# Patient Record
Sex: FEMALE | Race: WHITE | NOT HISPANIC OR LATINO | Employment: OTHER | URBAN - NONMETROPOLITAN AREA
[De-identification: names, ages, dates, MRNs, and addresses within clinical notes are randomized per-mention and may not be internally consistent; named-entity substitution may affect disease eponyms.]

---

## 2017-01-01 ENCOUNTER — CLINICAL SUPPORT (OUTPATIENT)
Dept: AUDIOLOGY | Facility: CLINIC | Age: 82
End: 2017-01-01

## 2017-01-01 DIAGNOSIS — Z46.1 ENCOUNTER FOR FITTING OR ADJUSTMENT OF HEARING AID: Primary | ICD-10-CM

## 2017-01-01 PROCEDURE — HEARINGNOCHG: Performed by: AUDIOLOGIST

## 2017-01-11 ENCOUNTER — CLINICAL SUPPORT (OUTPATIENT)
Dept: AUDIOLOGY | Facility: CLINIC | Age: 82
End: 2017-01-11

## 2017-01-11 DIAGNOSIS — Z46.1 ENCOUNTER FOR FITTING OR ADJUSTMENT OF HEARING AID: Primary | ICD-10-CM

## 2017-01-11 PROCEDURE — HEARINGNOCHG: Performed by: AUDIOLOGIST

## 2017-01-11 NOTE — PROGRESS NOTES
HEARING AID CHECK    Name:  Tiffanie Arroyo  :  9/10/1926  Age:  89 y.o.  Date of Evaluation:  2017      HISTORY    Reason for visit:  Tiffanie Arroyo is seen today for a hearing aid check.  Patient reports she just needs her hearing aids cleaned and checked.    Hearing aid history:  Patient is currently wearing a In the Ear (ITE) hearing aid in both ear(s).      OFFICE VISIT    During today's visit hearing aids were cleaned and checked and were found to be in good working condition.  Ms. Arroyo wishes to return for another cleaning in 1 year.    It was a pleasure seeing Tiffanie Arroyo in Audiology today.  It is a pleasure helping Ms. Arroyo with her amplification needs.             This document has been electronically signed by Yenifer Judge MS CCC-KENNETH on 2017 10:24 AM       Yenifer Judge MS CCC-KENNETH  Licensed Audiologist    For Billing and Codin  Hearing Aid Check, binaural - no charge

## 2017-01-16 ENCOUNTER — OFFICE VISIT (OUTPATIENT)
Dept: FAMILY MEDICINE CLINIC | Facility: CLINIC | Age: 82
End: 2017-01-16

## 2017-01-16 ENCOUNTER — HOSPITAL ENCOUNTER (OUTPATIENT)
Dept: OTHER | Facility: HOSPITAL | Age: 82
Discharge: HOME | End: 2017-01-16
Attending: FAMILY MEDICINE | Admitting: FAMILY MEDICINE

## 2017-01-16 VITALS
WEIGHT: 114 LBS | OXYGEN SATURATION: 96 % | DIASTOLIC BLOOD PRESSURE: 60 MMHG | BODY MASS INDEX: 18.97 KG/M2 | SYSTOLIC BLOOD PRESSURE: 90 MMHG | HEART RATE: 62 BPM

## 2017-01-16 DIAGNOSIS — G89.29 CHRONIC MIDLINE LOW BACK PAIN WITHOUT SCIATICA: ICD-10-CM

## 2017-01-16 DIAGNOSIS — M54.50 CHRONIC MIDLINE LOW BACK PAIN WITHOUT SCIATICA: ICD-10-CM

## 2017-01-16 DIAGNOSIS — M25.562 ACUTE PAIN OF LEFT KNEE: Primary | ICD-10-CM

## 2017-01-16 PROCEDURE — 99214 OFFICE O/P EST MOD 30 MIN: CPT | Performed by: FAMILY MEDICINE

## 2017-01-16 RX ORDER — CARVEDILOL 12.5 MG/1
12.5 TABLET ORAL 2 TIMES DAILY WITH MEALS
Qty: 60 TABLET | Refills: 11 | Status: SHIPPED | OUTPATIENT
Start: 2017-01-16 | End: 2017-02-20

## 2017-01-16 RX ORDER — FUROSEMIDE 20 MG/1
40 TABLET ORAL DAILY
Qty: 60 TABLET | Refills: 11 | Status: SHIPPED | OUTPATIENT
Start: 2017-01-16 | End: 2017-01-16 | Stop reason: SDUPTHER

## 2017-01-16 RX ORDER — HYDROCODONE BITARTRATE AND ACETAMINOPHEN 5; 325 MG/1; MG/1
1 TABLET ORAL EVERY 8 HOURS PRN
Qty: 90 TABLET | Refills: 0 | Status: SHIPPED | OUTPATIENT
Start: 2017-01-16 | End: 2017-02-20 | Stop reason: SDUPTHER

## 2017-01-16 RX ORDER — FUROSEMIDE 20 MG/1
TABLET ORAL
Qty: 60 TABLET | Refills: 10 | Status: SHIPPED | OUTPATIENT
Start: 2017-01-16 | End: 2018-01-01 | Stop reason: SDUPTHER

## 2017-01-16 NOTE — MR AVS SNAPSHOT
Tiffanie Finn Bone   1/16/2017 10:15 AM   Office Visit    Dept Phone:  [phone]   Encounter #:  [id number]    Provider:  Abilio Bhardwaj MD   Department:  [facility] FAMILY MEDICINE                Your Full Care Plan              Today's Medication Changes          These changes are accurate as of: 1/16/17 11:43 AM.  If you have any questions, ask your nurse or doctor.               Medication(s)that have changed:     carvedilol 12.5 MG tablet   Commonly known as:  COREG   Take 1 tablet by mouth 2 (Two) Times a Day With Meals.   What changed:  See the new instructions.   Changed by:  Abilio Bhardwaj MD       furosemide 20 MG tablet   Commonly known as:  LASIX   Take 2 tablets by mouth Daily.   What changed:  how much to take   Changed by:  Abilio Bhardwaj MD       sertraline 50 MG tablet   Commonly known as:  ZOLOFT   Take 1 tablet by mouth Daily.   What changed:  when to take this   Changed by:  Abilio Bhardwaj MD            Where to Get Your Medications      These medications were sent to [address] [phone] [place] [phone] [address] [address]     Phone:  [phone]     carvedilol 12.5 MG tablet    furosemide 20 MG tablet    sertraline 50 MG tablet         You can get these medications from any pharmacy     Bring a paper prescription for each of these medications     HYDROcodone-acetaminophen 5-325 MG per tablet                  Your Updated Medication List          This list is accurate as of: 1/16/17 11:43 AM.  Always use your most recent med list.                ALPHAGAN P 0.1 % solution ophthalmic solution   Generic drug:  brimonidine   Administer 1 drop to both eyes 3 (three) times a day.       aspirin 81 MG EC tablet   Take 1 tablet by mouth daily.       carvedilol 12.5 MG tablet   Commonly known as:  COREG   Take 1 tablet by mouth 2 (Two) Times a Day With Meals.         MG capsule   Generic drug:  docusate sodium   TAKE 1 CAPSULE TWICE DAILY       furosemide 20 MG tablet   Commonly known as:  LASIX   Take 2 tablets by mouth Daily.       HYDROcodone-acetaminophen 5-325 MG per tablet   Commonly known as:  NORCO   Take 1 tablet by mouth Every 8 (Eight) Hours As Needed (back pain).       latanoprost 0.005 % ophthalmic solution   Commonly known as:  XALATAN   Administer 1 drop to both eyes Every Night.       levothyroxine 112 MCG tablet   Commonly known as:  SYNTHROID, LEVOTHROID   TAKE 1 TAB DAILY FOR THYROID, ON EMPTY STOMACH -- EITHER 1 HOUR BEFORE EATING OR 2 HOURS AFTER       lisinopril 10 MG tablet   Commonly known as:  PRINIVIL,ZESTRIL       ondansetron 4 MG tablet   Commonly known as:  ZOFRAN   Take 1 tablet by mouth Every 8 (Eight) Hours As Needed for nausea or vomiting.       rOPINIRole 0.5 MG tablet   Commonly known as:  REQUIP   Take 1 tablet by mouth every night.       sertraline 50 MG tablet   Commonly known as:  ZOLOFT   Take 1 tablet by mouth Daily.               We Performed the Following     Ambulatory Referral to Orthopedic Surgery       You Were Diagnosed With        Codes Comments    Acute pain of left knee    -  Primary ICD-10-CM: M25.562  ICD-9-CM: 719.46       Instructions     None    Patient Instructions History      Upcoming Appointments     Visit Type Date Time Department    OFFICE VISIT 1/16/2017 10:15 AM [facility] 4TH    INJECTION 1/30/2017  1:00 PM [facility]    OFFICE VISIT 2/1/2017 11:00 AM [facility]    OFFICE VISIT 2/20/2017 10:30 AM [facility] 4TH    OFFICE VISIT 6/15/2017  2:30 PM [facility] HEART C[facility]      MyChart Signup     Our records indicate that you have declined [facility] signup. If you would like to sign up for [facility], please email "[email or web address] or call [phone] to obtain an activation code.             Other Info from Your Visit           Your Appointments     Jan 30, 2017  1:00  PM CST   Injection with RAMÓN Frey   [facility] ORTHOPEDICS (--)    [address]  [facility] [address] [phone]            Feb 01, 2017 11:00 AM CST   Office Visit with Adan Pacheco MD   [facility] OPHTHALMOLOGY (--)    [address]  [address] [facility] [address] [phone]           Arrive 15 minutes prior to appointment.            Feb 20, 2017 10:30 AM CST   Office Visit with Abilio Bhardwaj MD   [facility] FAMILY MEDICINE (--)    [address]  [address] [facility] [address] [phone]           Arrive 15 minutes prior to appointment.            Sourav 15, 2017  2:30 PM CDT   Office Visit with Jose Chao MD   [facility] CARDIOLOGY (--)    [address]  [facility] [address] [phone]           Arrive 15 minutes prior to appointment.              Allergies     Codeine      Penicillins      Sulfa Antibiotics        Reason for Visit     Back Pain     Knee Pain left pain      Vital Signs     Blood Pressure Pulse Weight Oxygen Saturation Body Mass Index Smoking Status    90/60 62 114 lb (51.7 kg) 96% 18.97 kg/m2 Never Smoker      Problems and Diagnoses Noted     Acute pain of left knee    -  Primary

## 2017-01-16 NOTE — PROGRESS NOTES
Subjective   Tiffanie Arroyo is a 89 y.o. female.     Back Pain   This is a chronic problem. The current episode started more than 1 year ago. The problem is unchanged. The pain is present in the lumbar spine and sacro-iliac. Quality: sharp minly. The pain does not radiate. The pain is mild. The pain is the same all the time. The symptoms are aggravated by sitting and lying down. Stiffness is present in the morning. Pertinent negatives include no bladder incontinence, bowel incontinence or fever.   Knee Pain    There was no injury mechanism. The pain is at a severity of 8/10. The pain is moderate. The pain has been worsening since onset. Associated symptoms include an inability to bear weight, a loss of motion and muscle weakness.        The following portions of the patient's history were reviewed and updated as appropriate: allergies, current medications, past family history, past medical history, past social history, past surgical history and problem list.    Review of Systems   Constitutional: Negative for fever.   Gastrointestinal: Negative for bowel incontinence.   Genitourinary: Negative for bladder incontinence.   Musculoskeletal: Positive for back pain.       Objective   Physical Exam   Constitutional: She is oriented to person, place, and time. She appears well-developed and well-nourished. No distress.   HENT:   Head: Normocephalic and atraumatic.   Musculoskeletal:        Left knee: She exhibits decreased range of motion and swelling. Tenderness found.        Lumbar back: She exhibits tenderness, bony tenderness, pain and spasm. She exhibits no swelling, no edema, no deformity and no laceration.       Vascular Status -  Her exam exhibits no right foot edema. Her exam exhibits no left foot edema.  Neurological: She is alert and oriented to person, place, and time.   Reflex Scores:       Patellar reflexes are 1+ on the right side and 1+ on the left side.  Skin: Skin is warm and dry. She is not  diaphoretic.   Psychiatric: She has a normal mood and affect. Her behavior is normal. Judgment and thought content normal.   Nursing note and vitals reviewed.      Assessment/Plan   Problems Addressed this Visit        Other    Chronic midline low back pain without sciatica      Other Visit Diagnoses     Acute pain of left knee    -  Primary    Relevant Orders    XR Knee 4+ View Left    Ambulatory Referral to Orthopedic Surgery

## 2017-01-19 ENCOUNTER — TELEPHONE (OUTPATIENT)
Dept: FAMILY MEDICINE CLINIC | Facility: CLINIC | Age: 82
End: 2017-01-19

## 2017-01-20 NOTE — TELEPHONE ENCOUNTER
X-ray results of her knee were Negative/Normal.  Pt advised to return if she does not improve,    ----- Message from Abilio Bhardwaj MD sent at 1/16/2017  2:15 PM CST -----  No new breaks noted.

## 2017-01-30 ENCOUNTER — CLINICAL SUPPORT (OUTPATIENT)
Dept: ORTHOPEDIC SURGERY | Facility: CLINIC | Age: 82
End: 2017-01-30

## 2017-01-30 DIAGNOSIS — M81.0 OSTEOPOROSIS: Primary | ICD-10-CM

## 2017-01-30 PROCEDURE — 96372 THER/PROPH/DIAG INJ SC/IM: CPT | Performed by: NURSE PRACTITIONER

## 2017-01-30 NOTE — MR AVS SNAPSHOT
Tiffanie Finn Bone   1/30/2017 1:00 PM   Clinical Support    Dept Phone:  [phone]   Encounter #:  [id number]    Provider:  RAMÓN Frey   Department:  [facility] ORTHOPEDICS                Your Full Care Plan              Your Updated Medication List          This list is accurate as of: 1/30/17  1:07 PM.  Always use your most recent med list.                ALPHAGAN P 0.1 % solution ophthalmic solution   Generic drug:  brimonidine   Administer 1 drop to both eyes 3 (three) times a day.       aspirin 81 MG EC tablet   Take 1 tablet by mouth daily.       carvedilol 12.5 MG tablet   Commonly known as:  COREG   Take 1 tablet by mouth 2 (Two) Times a Day With Meals.        MG capsule   Generic drug:  docusate sodium   TAKE 1 CAPSULE TWICE DAILY       furosemide 20 MG tablet   Commonly known as:  LASIX   TAKE 2 TABLETS EACH MORNING.       HYDROcodone-acetaminophen 5-325 MG per tablet   Commonly known as:  NORCO   Take 1 tablet by mouth Every 8 (Eight) Hours As Needed (back pain).       latanoprost 0.005 % ophthalmic solution   Commonly known as:  XALATAN   Administer 1 drop to both eyes Every Night.       levothyroxine 112 MCG tablet   Commonly known as:  SYNTHROID, LEVOTHROID   TAKE 1 TAB DAILY FOR THYROID, ON EMPTY STOMACH -- EITHER 1 HOUR BEFORE EATING OR 2 HOURS AFTER       lisinopril 10 MG tablet   Commonly known as:  PRINIVIL,ZESTRIL       ondansetron 4 MG tablet   Commonly known as:  ZOFRAN   Take 1 tablet by mouth Every 8 (Eight) Hours As Needed for nausea or vomiting.       rOPINIRole 0.5 MG tablet   Commonly known as:  REQUIP   Take 1 tablet by mouth every night.       sertraline 50 MG tablet   Commonly known as:  ZOLOFT   Take 1 tablet by mouth Daily.               You Were Diagnosed With        Codes Comments    Osteoporosis    -  Primary ICD-10-CM: M81.0  ICD-9-CM: 733.00       Medications to be Given to You by a Medical Professional     Due          Frequency    (none) denosumab (PROLIA) syringe 60 mg  Once      Instructions     None    Patient Instructions History      Upcoming Appointments     Visit Type Date Time Department    INJECTION 1/30/2017  1:00 PM [facility] ORTHOPEDIC [facility]    OFFICE VISIT 2/1/2017 11:00 AM [facility] OPHTHALMOLOGY MAD    OFFICE VISIT 2/20/2017 10:30 AM [facility] FAM MED MAD 4TH    NEW PROBLEM 2/23/2017 10:40 AM [facility] ORTHOPEDIC [facility]    OFFICE VISIT 6/15/2017  2:30 PM [facility] HEART CARE MAD    INJECTION 7/31/2017  1:00 PM [facility] ORTHOPEDIC [facility]      MyChart Signup     Our records indicate that you have declined [facility] MyChart signup. If you would like to sign up for [employer], please email [email or web address] or call [phone] to obtain an activation code.             Other Info from Your Visit           Your Appointments     Feb 01, 2017 11:00 AM CST   Office Visit with Adan Pacheco MD   [facility] OPHTHALMOLOGY (--)    [address] [place] [address] [address]  [address] [place] [address] [phone]           Arrive 15 minutes prior to appointment.            Feb 20, 2017 10:30 AM CST   Office Visit with Abilio Bhardwaj MD   [facility] FAMILY MEDICINE (--)    [address]  [address] [place] [address] [phone]           Arrive 15 minutes prior to appointment.            Feb 23, 2017 10:40 AM CST   New Problem with William Anton MD   [facility] ORTHOPEDICS (--)    [address]  [facility] [address] [phone]            Sourav 15, 2017  2:30 PM CDT   Office Visit with Jose Chao MD   [facility] CARDIOLOGY (--)    [address]  [facility] [address] [phone]           Arrive 15 minutes prior to appointment.            Jul 31, 2017  1:00 PM CDT   Injection with RAMÓN Frey   [facility] ORTHOPEDICS (--)    [address]  [facility] [address] [phone]              Allergies     Codeine      Penicillins      Sulfa Antibiotics        Reason for Visit     Osteoporosis prolia injection.       Vital Signs     Smoking Status                   Never Smoker           Problems and Diagnoses Noted     Osteoporosis      Medications Administered     denosumab (PROLIA) syringe 60 mg

## 2017-01-30 NOTE — PROGRESS NOTES
Chief Complaint   Patient presents with   • Osteoporosis     prolia injection.      Abilio Bhardwaj MD    HPI:  Patient returns for a prolia injection.  No new complaints    Scheduled Meds:  Continuous Infusions:  No current facility-administered medications for this visit.   PRN Meds:.  Allergies   Allergen Reactions   • Codeine    • Penicillins    • Sulfa Antibiotics        Review of Systems      There were no vitals filed for this visit.    Physical Exam    ASSESSMENT:    Diagnoses and all orders for this visit:    Osteoporosis  -     denosumab (PROLIA) syringe 60 mg; Inject 1 mL under the skin 1 (One) Time.        Plan:    The patient was injected, under aseptic conditions, into the:    []  Right arm    []  Left arm  []  Right thigh    []  Left thigh  []  Right side of abdomen  [x]  Left side of abdomen    The patient tolerated the procedure well.  Plan Followup in 6 months for next injection.    NDC #:      64371      01/30/17 at 1:08 PM by RAMÓN Reyes

## 2017-02-01 ENCOUNTER — OFFICE VISIT (OUTPATIENT)
Dept: OPHTHALMOLOGY | Facility: CLINIC | Age: 82
End: 2017-02-01

## 2017-02-01 DIAGNOSIS — H40.1133 PRIMARY OPEN ANGLE GLAUCOMA OF BOTH EYES, SEVERE STAGE: Primary | ICD-10-CM

## 2017-02-01 DIAGNOSIS — H26.9 CATARACT: ICD-10-CM

## 2017-02-01 DIAGNOSIS — H40.10X0 OPEN-ANGLE GLAUCOMA OF BOTH EYES, UNSPECIFIED GLAUCOMA STAGE, UNSPECIFIED OPEN-ANGLE GLAUCOMA TYPE: Primary | ICD-10-CM

## 2017-02-01 DIAGNOSIS — Z96.1 PSEUDOPHAKIA: ICD-10-CM

## 2017-02-01 PROCEDURE — 99213 OFFICE O/P EST LOW 20 MIN: CPT | Performed by: OPHTHALMOLOGY

## 2017-02-01 PROCEDURE — 92133 CPTRZD OPH DX IMG PST SGM ON: CPT | Performed by: OPHTHALMOLOGY

## 2017-02-01 RX ORDER — BRIMONIDINE TARTRATE 0.1 %
1 DROPS OPHTHALMIC (EYE) 3 TIMES DAILY
Qty: 5 ML | Refills: 5 | Status: SHIPPED | OUTPATIENT
Start: 2017-02-01 | End: 2017-10-03 | Stop reason: SDUPTHER

## 2017-02-01 RX ORDER — LATANOPROST 50 UG/ML
1 SOLUTION/ DROPS OPHTHALMIC NIGHTLY
Qty: 2.5 ML | Refills: 5 | Status: SHIPPED | OUTPATIENT
Start: 2017-02-01 | End: 2017-08-14 | Stop reason: SDUPTHER

## 2017-02-01 NOTE — PROGRESS NOTES
Subjective   Tiffanie Arroyo is a 89 y.o. female.   Chief Complaint   Patient presents with   • Glaucoma   • Macular Degeneration     Ext choroidal atrophy   • Artificial Lens Present     right   • Cataract       HPI     Macular Degeneration    Additional comments: Ext choroidal atrophy           Artificial Lens Present    Additional comments: right           Cataract   In left eye.       Last edited by Adan Pacheco MD on 2/1/2017 12:22 PM. (History)          Review of Systems   Eyes: Positive for visual disturbance. Negative for pain.       Objective   Visual Acuity (Snellen - Linear)      Right Left   Dist cc CF at 3' 20/200       Correction:  Glasses         Wearing Rx      Sphere Cylinder Axis Add   Right -1.75 +2.00 167 +3.25   Left -0.50 +1.00 161 +3.00                 Pupils      Pupils   Right PERRL   Left PERRL           Confrontational Visual Fields     Visual Fields      Left Right   Result Full Full                  Extraocular Movement      Right Left   Result Full, Ortho Full, Ortho              Tonometry (Applanation, 12:22 PM)      Right Left   Pressure 19 19              Main Ophthalmology Exam     External Exam      Right Left    External Normal Normal      Slit Lamp Exam      Right Left    Lids/Lashes Normal Normal    Conjunctiva/Sclera White and quiet White and quiet    Cornea Clear Clear    Anterior Chamber Deep and quiet Deep and quiet    Iris Round and reactive Round and reactive    Lens Posterior chamber intraocular lens Posterior chamber intraocular lens    Vitreous Normal Normal      Fundus Exam      Right Left    Disc Sloped cup, Thin rim Sloped cup, Thin rim    Macula Advanced age related macular degeneration Advanced age related macular degeneration    Vessels Normal Normal    Periphery Normal Normal                Assessment/Plan   Problems Addressed this Visit     Cataract    Pseudophakia    Primary open angle glaucoma of both eyes, severe stage - Primary

## 2017-02-20 ENCOUNTER — TRANSCRIBE ORDERS (OUTPATIENT)
Dept: LAB | Facility: HOSPITAL | Age: 82
End: 2017-02-20

## 2017-02-20 ENCOUNTER — OFFICE VISIT (OUTPATIENT)
Dept: FAMILY MEDICINE CLINIC | Facility: CLINIC | Age: 82
End: 2017-02-20

## 2017-02-20 ENCOUNTER — LAB (OUTPATIENT)
Dept: LAB | Facility: HOSPITAL | Age: 82
End: 2017-02-20

## 2017-02-20 VITALS
HEART RATE: 44 BPM | DIASTOLIC BLOOD PRESSURE: 60 MMHG | OXYGEN SATURATION: 98 % | WEIGHT: 115 LBS | SYSTOLIC BLOOD PRESSURE: 100 MMHG | BODY MASS INDEX: 19.14 KG/M2

## 2017-02-20 DIAGNOSIS — R00.1 BRADYCARDIA: Primary | ICD-10-CM

## 2017-02-20 DIAGNOSIS — H40.1133 PRIMARY OPEN ANGLE GLAUCOMA OF BOTH EYES, SEVERE STAGE: Primary | ICD-10-CM

## 2017-02-20 DIAGNOSIS — I25.10 CORONARY ARTERY DISEASE INVOLVING NATIVE CORONARY ARTERY OF NATIVE HEART WITHOUT ANGINA PECTORIS: ICD-10-CM

## 2017-02-20 DIAGNOSIS — E78.2 MIXED HYPERLIPIDEMIA: ICD-10-CM

## 2017-02-20 DIAGNOSIS — I38 HEART VALVE DISORDER: ICD-10-CM

## 2017-02-20 DIAGNOSIS — E03.9 ACQUIRED HYPOTHYROIDISM: ICD-10-CM

## 2017-02-20 DIAGNOSIS — H40.1133 PRIMARY OPEN ANGLE GLAUCOMA OF BOTH EYES, SEVERE STAGE: ICD-10-CM

## 2017-02-20 DIAGNOSIS — F32.A DEPRESSIVE DISORDER: ICD-10-CM

## 2017-02-20 DIAGNOSIS — G89.29 CHRONIC MIDLINE LOW BACK PAIN WITHOUT SCIATICA: ICD-10-CM

## 2017-02-20 DIAGNOSIS — I10 ESSENTIAL HYPERTENSION: ICD-10-CM

## 2017-02-20 DIAGNOSIS — M54.50 CHRONIC MIDLINE LOW BACK PAIN WITHOUT SCIATICA: ICD-10-CM

## 2017-02-20 LAB
ALBUMIN SERPL-MCNC: 3.8 G/DL (ref 3.4–4.8)
ALBUMIN/GLOB SERPL: 1.2 G/DL (ref 1.1–1.8)
ALP SERPL-CCNC: 92 U/L (ref 38–126)
ALT SERPL W P-5'-P-CCNC: 23 U/L (ref 9–52)
ANION GAP SERPL CALCULATED.3IONS-SCNC: 9 MMOL/L (ref 5–15)
ARTICHOKE IGE QN: 66 MG/DL (ref 1–129)
AST SERPL-CCNC: 39 U/L (ref 14–36)
BILIRUB SERPL-MCNC: 0.6 MG/DL (ref 0.2–1.3)
BUN BLD-MCNC: 23 MG/DL (ref 7–21)
BUN/CREAT SERPL: 30.7 (ref 7–25)
CALCIUM SPEC-SCNC: 8.5 MG/DL (ref 8.4–10.2)
CHLORIDE SERPL-SCNC: 103 MMOL/L (ref 95–110)
CHOLEST SERPL-MCNC: 141 MG/DL (ref 0–199)
CO2 SERPL-SCNC: 29 MMOL/L (ref 22–31)
CREAT BLD-MCNC: 0.75 MG/DL (ref 0.5–1)
CRP SERPL-MCNC: 0.7 MG/DL (ref 0–1)
ERYTHROCYTE [SEDIMENTATION RATE] IN BLOOD: 33 MM/HR (ref 0–20)
GFR SERPL CREATININE-BSD FRML MDRD: 73 ML/MIN/1.73 (ref 39–90)
GLOBULIN UR ELPH-MCNC: 3.2 GM/DL (ref 2.3–3.5)
GLUCOSE BLD-MCNC: 115 MG/DL (ref 60–100)
HDLC SERPL-MCNC: 50 MG/DL (ref 60–200)
LDLC/HDLC SERPL: 1.56 {RATIO} (ref 0–3.22)
POTASSIUM BLD-SCNC: 4.6 MMOL/L (ref 3.5–5.1)
PROT SERPL-MCNC: 7 G/DL (ref 6.3–8.6)
SODIUM BLD-SCNC: 141 MMOL/L (ref 137–145)
T4 FREE SERPL-MCNC: 1.76 NG/DL (ref 0.78–2.19)
TRIGL SERPL-MCNC: 65 MG/DL (ref 20–199)
TSH SERPL DL<=0.05 MIU/L-ACNC: 0.2 MIU/ML (ref 0.46–4.68)

## 2017-02-20 PROCEDURE — 84439 ASSAY OF FREE THYROXINE: CPT | Performed by: FAMILY MEDICINE

## 2017-02-20 PROCEDURE — 80053 COMPREHEN METABOLIC PANEL: CPT | Performed by: FAMILY MEDICINE

## 2017-02-20 PROCEDURE — 80061 LIPID PANEL: CPT | Performed by: FAMILY MEDICINE

## 2017-02-20 PROCEDURE — 93010 ELECTROCARDIOGRAM REPORT: CPT | Performed by: INTERNAL MEDICINE

## 2017-02-20 PROCEDURE — 86140 C-REACTIVE PROTEIN: CPT | Performed by: FAMILY MEDICINE

## 2017-02-20 PROCEDURE — 85651 RBC SED RATE NONAUTOMATED: CPT | Performed by: FAMILY MEDICINE

## 2017-02-20 PROCEDURE — 85025 COMPLETE CBC W/AUTO DIFF WBC: CPT | Performed by: FAMILY MEDICINE

## 2017-02-20 PROCEDURE — 99214 OFFICE O/P EST MOD 30 MIN: CPT | Performed by: FAMILY MEDICINE

## 2017-02-20 PROCEDURE — 36415 COLL VENOUS BLD VENIPUNCTURE: CPT | Performed by: FAMILY MEDICINE

## 2017-02-20 PROCEDURE — 84443 ASSAY THYROID STIM HORMONE: CPT | Performed by: FAMILY MEDICINE

## 2017-02-20 PROCEDURE — 93005 ELECTROCARDIOGRAM TRACING: CPT | Performed by: FAMILY MEDICINE

## 2017-02-20 RX ORDER — HYDROCODONE BITARTRATE AND ACETAMINOPHEN 5; 325 MG/1; MG/1
1 TABLET ORAL EVERY 8 HOURS PRN
Qty: 90 TABLET | Refills: 0 | Status: SHIPPED | OUTPATIENT
Start: 2017-02-20 | End: 2017-03-15 | Stop reason: SDUPTHER

## 2017-02-20 RX ORDER — CARVEDILOL 6.25 MG/1
6.25 TABLET ORAL 2 TIMES DAILY WITH MEALS
Qty: 60 TABLET | Refills: 11 | Status: SHIPPED | OUTPATIENT
Start: 2017-02-20 | End: 2018-01-01 | Stop reason: SDUPTHER

## 2017-02-20 NOTE — PROGRESS NOTES
Subjective   Tiffanie Arroyo is a 89 y.o. female.     Back Pain   This is a chronic problem. The current episode started more than 1 year ago. The problem is unchanged. The pain is present in the lumbar spine and sacro-iliac. Quality: sharp minly. The pain does not radiate. The pain is at a severity of 8/10. The pain is moderate. The pain is the same all the time. The symptoms are aggravated by sitting and lying down. Pertinent negatives include no bladder incontinence, bowel incontinence, chest pain or fever.   Knee Pain      Hypertension   This is a chronic problem. The problem is unchanged. The problem is controlled. Associated symptoms include shortness of breath (VINES). Pertinent negatives include no chest pain, orthopnea, palpitations, peripheral edema or PND.   Hypothyroidism   This is a chronic problem. The current episode started more than 1 year ago. The problem has been unchanged. Pertinent negatives include no chest pain or fever.   Depression   Visit Type: follow-up  Patient presents with the following symptoms: depressed mood, insomnia, nervousness/anxiety and shortness of breath (VINES).  Patient is not experiencing: palpitations, suicidal ideas, suicidal planning and thoughts of death.             Review of Systems   Constitutional: Negative for fever.   Respiratory: Positive for shortness of breath (VINES).    Cardiovascular: Negative for chest pain, palpitations, orthopnea and PND.   Gastrointestinal: Negative for bowel incontinence.   Genitourinary: Negative for bladder incontinence.   Musculoskeletal: Positive for back pain.   Psychiatric/Behavioral: Negative for suicidal ideas. The patient is nervous/anxious and has insomnia.        Objective   Physical Exam   Constitutional: She is oriented to person, place, and time. She appears well-developed and well-nourished. No distress.   HENT:   Head: Normocephalic and atraumatic.   Cardiovascular: Normal rate.    Pulmonary/Chest: Effort normal and breath  sounds normal.   Abdominal: Bowel sounds are normal. There is no tenderness.   Musculoskeletal:        Lumbar back: She exhibits tenderness and pain. She exhibits no bony tenderness, no swelling, no edema, no deformity, no laceration and no spasm.   Neurological: She is alert and oriented to person, place, and time.   Skin: Skin is warm and dry. She is not diaphoretic.   Psychiatric: She has a normal mood and affect. Her speech is normal and behavior is normal. Judgment and thought content normal.   Nursing note and vitals reviewed.      Assessment/Plan   Problems Addressed this Visit        Cardiovascular and Mediastinum    Coronary artery disease involving native coronary artery of native heart without angina pectoris    Relevant Medications    carvedilol (COREG) 6.25 MG tablet    Essential hypertension    Relevant Medications    carvedilol (COREG) 6.25 MG tablet    Other Relevant Orders    Comprehensive Metabolic Panel    Heart valve disorder    Relevant Medications    carvedilol (COREG) 6.25 MG tablet    Mixed hyperlipidemia - Primary    Relevant Orders    Lipid Panel       Endocrine    Acquired hypothyroidism    Relevant Medications    carvedilol (COREG) 6.25 MG tablet    Other Relevant Orders    TSH    T4, free       Other    Depressive disorder    Chronic midline low back pain without sciatica    Primary open angle glaucoma of both eyes, severe stage    Relevant Orders    Sedimentation Rate    C-reactive protein      Other Visit Diagnoses     Bradycardia                Call pulse tomorrow after coreg adjustment today. She is asymptomatic

## 2017-02-20 NOTE — PROGRESS NOTES
Subjective   Tiffanie Arroyo is a 89 y.o. female.     Back Pain     Knee Pain      Hypertension     Hypothyroidism     Coronary Artery Disease   Her past medical history is significant for CHF.   Congestive Heart Failure   Her past medical history is significant for CAD.   Depression Patient has a history of: CAD and CHF         {Common H&P Review Areas:20938}    Review of Systems   Musculoskeletal: Positive for back pain.       Objective   Physical Exam    Assessment/Plan   {Assess/PlanSmartLinks:86341}

## 2017-02-21 ENCOUNTER — TELEPHONE (OUTPATIENT)
Dept: FAMILY MEDICINE CLINIC | Facility: CLINIC | Age: 82
End: 2017-02-21

## 2017-02-21 DIAGNOSIS — E86.0 DEHYDRATION: Primary | ICD-10-CM

## 2017-02-21 NOTE — PROGRESS NOTES
RESULTS & RECOMMENDATIONS RELAYED pr Dr. Bhardwaj instruction  Dehydrated, Increase water to 6 glasses a day  [place] Blood Work in 2 weeks.   Order Placed

## 2017-02-21 NOTE — PROGRESS NOTES
RESULTS & RECOMMENDATIONS RELAYED pr Dr. Bhardwaj's instruction  Lab called, requested they check and see if the labs Dr. Arriaga were sent to him.  If not to please send.

## 2017-02-21 NOTE — TELEPHONE ENCOUNTER
----- Message from Abilio Bhardwaj MD sent at 2/21/2017 11:05 AM CST -----  Regarding: RE: pulse rate  Tell her this is good  ----- Message -----     From: Ilda Collins MA     Sent: 2/21/2017   9:43 AM       To: Abilio Bhardwaj MD  Subject: FW: pulse rate                                       ----- Message -----     From: Fadia Lama     Sent: 2/21/2017   9:24 AM       To: Ilda Collins MA  Subject: pulse rate                                       CAREGIVER FOR SOPHIA ZAMBRANO CALLED WITH HER PULSE RATE TODAY ..AND IT IS  60

## 2017-02-21 NOTE — PROGRESS NOTES
Thyroid is okay.  Please check with lab to make sure a copy of the sedimentation rate and the C reactive protein.sent to her ophthalmologist.

## 2017-02-21 NOTE — TELEPHONE ENCOUNTER
RESULTS & RECOMMENDATIONS RELAYED pr Dr. Bhardwaj instruction  Dehydrated, Increase water to 6 glasses a day  [facility] Blood Work in 2 weeks.   Order Placed  Lab called to make sure the Lab work was forwarded on to Dr. Arriaga in [place].   They will fax it over.       ----- Message from Abilio Bhardwaj MD sent at 2/21/2017 12:42 PM CST -----  Light dehydrated.  Increase fluid to 6 glasses water a day.  Recheck CMP and 2 weeks

## 2017-02-23 ENCOUNTER — OFFICE VISIT (OUTPATIENT)
Dept: ORTHOPEDIC SURGERY | Facility: CLINIC | Age: 82
End: 2017-02-23

## 2017-02-23 VITALS — BODY MASS INDEX: 19.16 KG/M2 | HEIGHT: 65 IN | WEIGHT: 115 LBS

## 2017-02-23 DIAGNOSIS — G89.29 CHRONIC PAIN OF LEFT KNEE: Primary | ICD-10-CM

## 2017-02-23 DIAGNOSIS — M25.562 CHRONIC PAIN OF LEFT KNEE: Primary | ICD-10-CM

## 2017-02-23 DIAGNOSIS — M17.32 POST-TRAUMATIC OSTEOARTHRITIS OF LEFT KNEE: ICD-10-CM

## 2017-02-23 PROCEDURE — 99213 OFFICE O/P EST LOW 20 MIN: CPT | Performed by: ORTHOPAEDIC SURGERY

## 2017-02-23 NOTE — PROGRESS NOTES
Tiffanie Arroyo is a 89 y.o. female   Primary provider:  Abilio Bhardwaj MD       Chief Complaint   Patient presents with   • Left Knee - Establish Care, Pain   • Results     xray @ [facility]       HISTORY OF PRESENT ILLNESS:    Pain   This is a chronic problem. The current episode started more than 1 year ago. The problem occurs constantly. The problem has been unchanged. Associated symptoms comments: Sharp dull ache. The symptoms are aggravated by standing and walking. She has tried nothing for the symptoms.   Patient had a distal femur fracture about 5 years ago.  Pain with activity.  Pain with mobility.  Pain is severe at times.     CONCURRENT MEDICAL HISTORY:    Past Medical History   Diagnosis Date   • Abrasion and/ or friction burn of skin    • Abrasion of right hand, initial encounter- skin tear    • Acquired hypothyroidism    • Acute congestive heart failure    • Age-related physical debility    • Amblyopia- left    • Artificial eye lens in position- right    • Artificial lens present    • Coronary arteriosclerosis    • Coronary bypass graft finding    • Degenerative joint disease involving multiple joints      Degenerative joint disease, knees   • Dehydration    • Depressive disorder    • Dyslipidemia    • Ecchymosis      left cheek ecchymosis s/p fall   • Essential hypertension    • Generalized anxiety disorder    • Glaucoma    • Heart valve disorder      TR: severe MR: moderate RVSP: >60mmHg   • History of screening mammography    • Low back pain    • Malignant neoplasm of skin      Basal cell carcinoma, left nose.   • Nausea    • Need for immunization against influenza    • Nonrheumatic mitral valve insufficiency    • Nonrheumatic tricuspid valve disorder    • Nuclear senile cataract- left    • Osteoporosis      post foreto, followed by Dr. Salazar   • Other long-term (current) drug therapy    • Other Secondary pulmonary hypertension    • Peripheral edema    • Primary open angle glaucoma    • Repeated falls     • Restless legs    • Subdural hematoma    • Tear of skin- right anterior shin    • Unspecified Atrial flutter        Allergies   Allergen Reactions   • Codeine    • Penicillins    • Sulfa Antibiotics          Current Outpatient Prescriptions:   •  ALPHAGAN P 0.1 % solution ophthalmic solution, Administer 1 drop to both eyes 3 (Three) Times a Day., Disp: 5 mL, Rfl: 5  •  aspirin 81 MG EC tablet, Take 1 tablet by mouth daily., Disp: 30 tablet, Rfl: 11  •  carvedilol (COREG) 6.25 MG tablet, Take 1 tablet by mouth 2 (Two) Times a Day With Meals., Disp: 60 tablet, Rfl: 11  •   MG capsule, TAKE 1 CAPSULE TWICE DAILY, Disp: 60 capsule, Rfl: 5  •  furosemide (LASIX) 20 MG tablet, TAKE 2 TABLETS EACH MORNING., Disp: 60 tablet, Rfl: 10  •  HYDROcodone-acetaminophen (NORCO) 5-325 MG per tablet, Take 1 tablet by mouth Every 8 (Eight) Hours As Needed (back pain)., Disp: 90 tablet, Rfl: 0  •  latanoprost (XALATAN) 0.005 % ophthalmic solution, Administer 1 drop to both eyes Every Night., Disp: 2.5 mL, Rfl: 5  •  levothyroxine (SYNTHROID, LEVOTHROID) 112 MCG tablet, TAKE 1 TAB DAILY FOR THYROID, ON EMPTY STOMACH -- EITHER 1 HOUR BEFORE EATING OR 2 HOURS AFTER, Disp: 30 tablet, Rfl: 7  •  lisinopril (PRINIVIL,ZESTRIL) 10 MG tablet, Take 10 mg by mouth 2 (two) times a day., Disp: , Rfl:   •  ondansetron (ZOFRAN) 4 MG tablet, Take 1 tablet by mouth Every 8 (Eight) Hours As Needed for nausea or vomiting., Disp: 20 tablet, Rfl: 5  •  rOPINIRole (REQUIP) 0.5 MG tablet, Take 1 tablet by mouth every night., Disp: 90 tablet, Rfl: 5  •  sertraline (ZOLOFT) 50 MG tablet, Take 1 tablet by mouth Daily., Disp: 30 tablet, Rfl: 11    Past Surgical History   Procedure Laterality Date   • Hip surgery       Left femoral neck fracture. Endoprothesis of the left hip   •  section       Classical  section;     • Endoscopy and colonoscopy       Declined further colonoscopy   • Dental procedure       Surgical removal of #16 with  local anesthesia and IV sedation   • Other surgical history       Echocardiogram W/ color flow 44159 (1) - Mild pul.valvular regurg.Moderate mitral regurg.Right atrium moderately dilated.RV systolic pressure elevated.Severe tricuspid regurg.Left atrium moderate to severely dilated.EF 55-60%.Mlid aortic regurg.AV mildly sclerotic.   • Other surgical history       Echocardiogram 49392 (1) - EF 50% Left atrium, mildly dilated. Moderate to severe mitral regurgitation. RV systolic pressure 60mmHg. No intracardiac mass pericardial effusion or cardiac thrombus seen   • Chest exploration       CAD.Unstable angina, Percutaneous coronary intervention May 2010 with instent restinosis, hypertension, hypothyroidism, anxiety restless leg syndrome, non-ST elevation MI 2010. W/ pulmonary artery hemorrhage   • Exploratory laparotomy       Benign serous tumor, right ovary; small umbilical hernia. Bilateral salpingo-oophorectomy; repair or umbilical hernia   • Neck surgery       Mass, right neck possible chemodactoma. Right carotid arteriogram   • Neck lesion biopsy       0.6 cm inclusion cyst, back of neck. Excision   • Cataract extraction       right eye   • Excision lesion       REMOVE LESION BACK OR FLANK - Seborrheic keratosis, excision times two   • Shoulder surgery       Remove shoulder lesion (1) - Excision of two shoulder lesions   • Tumor removal       Remove tumor of back/flank - Excision of irritated hemangioma, 0.7 cm right bra line, 0.8 cm right flank and 0.7 cm left bra line   • Thyroidectomy       Total thyroidectomy and right radical neck dissection.   • Hysterectomy       w/appendectomy   • Tubal abdominal ligation       [place] tubal ligation       Family History   Problem Relation Age of Onset   • Heart disease Other    • Hypertension Other        Social History     Social History   • Marital status:      Spouse name: N/A   • Number of children: N/A   • Years of education: N/A     Occupational History   •  "Not on file.     Social History Main Topics   • Smoking status: Never Smoker   • Smokeless tobacco: Never Used   • Alcohol use No   • Drug use: No   • Sexual activity: Defer     Other Topics Concern   • Not on file     Social History Narrative        Review of Systems   HENT: Negative.    Respiratory: Negative.    Cardiovascular: Negative.    All other systems reviewed and are negative.      PHYSICAL EXAMINATION:       Visit Vitals   • Ht 65\" (165.1 cm)   • Wt 115 lb (52.2 kg)   • BMI 19.14 kg/m2       Physical Exam   Constitutional: She is oriented to person, place, and time. She appears well-developed and well-nourished.   Neurological: She is alert and oriented to person, place, and time.   Psychiatric: She has a normal mood and affect. Her behavior is normal. Judgment and thought content normal.         GAIT:     []  Normal  []  Antalgic    Assistive device: []  None  []  Walker     []  Crutches  []  Cane     [x]  Wheelchair  []  Stretcher    Left Knee Exam     Tenderness   Left knee tenderness location: diffuse.    Range of Motion   Extension: -5   Flexion: 120     Muscle Strength     The patient has normal left knee strength.    Tests   Drawer:       Anterior - negative     Posterior - negative  Varus: negative  Valgus: negative    Other   Sensation: normal  Pulse: present  Swelling: none    Comments:  Crepitation with motion  Mild to moderate pain through arc of motion                    Procedure- Left knee. 1/16/2017      INDICATION- Left knee pain.      Technique- Four views.      Prior examination- Left knee July 17, 2013.       FINDINGS- Old fracture distal femur stabilized with numerous  transverse surgical screws and a lateral compression plate.      There is a new vertical suspicious radiolucency in the distal shaft of  the femur suggesting a new fracture. For further evaluation and  confirmation other modalities such as CT distal femur, left knee may  provide additional information.      Conclusion- " Old fracture distal shaft of left femur.      No suspicious vertical radiolucency distal shaft of the femur  suggesting a nondisplaced fracture.      Electronically Signed By- Ady Núñez MD On: 2017-01-16 11:27:37      ASSESSMENT:    Diagnoses and all orders for this visit:    Chronic pain of left knee  -     Ambulatory Referral to Home Health  -     Large Joint Arthrocentesis    Post-traumatic osteoarthritis of left knee  -     Large Joint Arthrocentesis          PLAN    Home health for PT/OT for transfers, gait training, Bilateral LE strengthening, home survey, ADL's     Will inject today to help with pain and allow for mobility.    Large Joint Arthrocentesis  Date/Time: 2/25/2017 6:34 PM  Consent given by: patient  Site marked: site marked  Timeout: Immediately prior to procedure a time out was called to verify the correct patient, procedure, equipment, support staff and site/side marked as required   Supporting Documentation  Indications: pain   Procedure Details  Location: knee - L knee  Preparation: Patient was prepped and draped in the usual sterile fashion  Needle size: 22 G  Approach: anteromedial  Medications administered: 40 mg triamcinolone acetonide 40 MG/ML; 2 mL lidocaine 1 %  Patient tolerance: patient tolerated the procedure well with no immediate complications          Return if symptoms worsen or fail to improve.    William Anton MD

## 2017-02-25 PROCEDURE — 20610 DRAIN/INJ JOINT/BURSA W/O US: CPT | Performed by: ORTHOPAEDIC SURGERY

## 2017-02-25 RX ORDER — LIDOCAINE HYDROCHLORIDE 10 MG/ML
2 INJECTION, SOLUTION INFILTRATION; PERINEURAL
Status: COMPLETED | OUTPATIENT
Start: 2017-02-25 | End: 2017-02-25

## 2017-02-25 RX ORDER — TRIAMCINOLONE ACETONIDE 40 MG/ML
40 INJECTION, SUSPENSION INTRA-ARTICULAR; INTRAMUSCULAR
Status: COMPLETED | OUTPATIENT
Start: 2017-02-25 | End: 2017-02-25

## 2017-02-25 RX ADMIN — TRIAMCINOLONE ACETONIDE 40 MG: 40 INJECTION, SUSPENSION INTRA-ARTICULAR; INTRAMUSCULAR at 18:34

## 2017-02-25 RX ADMIN — LIDOCAINE HYDROCHLORIDE 2 ML: 10 INJECTION, SOLUTION INFILTRATION; PERINEURAL at 18:34

## 2017-02-27 ENCOUNTER — TELEPHONE (OUTPATIENT)
Dept: ORTHOPEDIC SURGERY | Facility: CLINIC | Age: 82
End: 2017-02-27

## 2017-02-27 NOTE — TELEPHONE ENCOUNTER
Lalit called to let Dr. Anton know that he went to eval Ms. Arroyo for PT and OT and she assured him she did not want or need these services.

## 2017-03-10 RX ORDER — LEVOTHYROXINE SODIUM 112 UG/1
TABLET ORAL
Qty: 30 TABLET | Refills: 7 | Status: SHIPPED | OUTPATIENT
Start: 2017-03-10 | End: 2017-10-11 | Stop reason: SDUPTHER

## 2017-03-10 RX ORDER — ONDANSETRON 4 MG/1
4 TABLET, FILM COATED ORAL EVERY 8 HOURS PRN
Qty: 20 TABLET | Refills: 5 | Status: SHIPPED | OUTPATIENT
Start: 2017-03-10 | End: 2017-11-24

## 2017-03-15 ENCOUNTER — OFFICE VISIT (OUTPATIENT)
Dept: FAMILY MEDICINE CLINIC | Facility: CLINIC | Age: 82
End: 2017-03-15

## 2017-03-15 VITALS — OXYGEN SATURATION: 99 % | DIASTOLIC BLOOD PRESSURE: 60 MMHG | HEART RATE: 52 BPM | SYSTOLIC BLOOD PRESSURE: 108 MMHG

## 2017-03-15 DIAGNOSIS — M54.50 CHRONIC MIDLINE LOW BACK PAIN WITHOUT SCIATICA: Primary | ICD-10-CM

## 2017-03-15 DIAGNOSIS — G89.29 CHRONIC MIDLINE LOW BACK PAIN WITHOUT SCIATICA: Primary | ICD-10-CM

## 2017-03-15 PROCEDURE — 99213 OFFICE O/P EST LOW 20 MIN: CPT | Performed by: FAMILY MEDICINE

## 2017-03-15 RX ORDER — HYDROCODONE BITARTRATE AND ACETAMINOPHEN 5; 325 MG/1; MG/1
1 TABLET ORAL EVERY 8 HOURS PRN
Qty: 90 TABLET | Refills: 0 | Status: SHIPPED | OUTPATIENT
Start: 2017-03-15 | End: 2017-04-18 | Stop reason: SDUPTHER

## 2017-03-15 NOTE — PROGRESS NOTES
Subjective   Tiffanie Arroyo is a 89 y.o. female.     History of Present Illness     {Common H&P Review Areas:02779}    Review of Systems    Objective   Physical Exam    Assessment/Plan   {Assess/PlanSmartLinks:95153}

## 2017-03-15 NOTE — PROGRESS NOTES
Subjective   Tiffanie Arroyo is a 89 y.o. female.     Knee Pain      Back Pain   This is a chronic problem. The current episode started more than 1 year ago. The problem is unchanged. The pain is present in the lumbar spine and sacro-iliac. Quality: sharp minly. The pain does not radiate. The pain is at a severity of 8/10. The pain is moderate. The pain is the same all the time. The symptoms are aggravated by sitting and lying down. Stiffness is present in the morning. Pertinent negatives include no bladder incontinence, bowel incontinence or dysuria.        The following portions of the patient's history were reviewed and updated as appropriate: allergies, current medications, past family history, past medical history, past social history, past surgical history and problem list.    Review of Systems   Gastrointestinal: Negative for bowel incontinence.   Genitourinary: Negative for bladder incontinence and dysuria.   Musculoskeletal: Positive for back pain.       Objective   Physical Exam   Constitutional: She is oriented to person, place, and time. She appears well-developed and well-nourished. No distress.   HENT:   Head: Normocephalic and atraumatic.   Musculoskeletal:        Lumbar back: She exhibits tenderness, bony tenderness, pain and spasm. She exhibits no swelling, no edema, no deformity and no laceration.       Vascular Status -  Her exam exhibits no right foot edema. Her exam exhibits no left foot edema.  Neurological: She is alert and oriented to person, place, and time.   Skin: Skin is warm and dry. She is not diaphoretic.   Psychiatric: She has a normal mood and affect. Her behavior is normal. Judgment and thought content normal.   Nursing note and vitals reviewed.      Assessment/Plan   Problems Addressed this Visit        Other    Chronic midline low back pain without sciatica - Primary

## 2017-04-18 ENCOUNTER — OFFICE VISIT (OUTPATIENT)
Dept: FAMILY MEDICINE CLINIC | Facility: CLINIC | Age: 82
End: 2017-04-18

## 2017-04-18 VITALS
HEART RATE: 55 BPM | SYSTOLIC BLOOD PRESSURE: 110 MMHG | DIASTOLIC BLOOD PRESSURE: 58 MMHG | OXYGEN SATURATION: 99 % | WEIGHT: 111.5 LBS | BODY MASS INDEX: 18.55 KG/M2

## 2017-04-18 DIAGNOSIS — G89.29 CHRONIC MIDLINE LOW BACK PAIN WITHOUT SCIATICA: Primary | ICD-10-CM

## 2017-04-18 DIAGNOSIS — M54.50 CHRONIC MIDLINE LOW BACK PAIN WITHOUT SCIATICA: Primary | ICD-10-CM

## 2017-04-18 PROCEDURE — 99213 OFFICE O/P EST LOW 20 MIN: CPT | Performed by: FAMILY MEDICINE

## 2017-04-18 RX ORDER — HYDROCODONE BITARTRATE AND ACETAMINOPHEN 5; 325 MG/1; MG/1
1 TABLET ORAL EVERY 8 HOURS PRN
Qty: 90 TABLET | Refills: 0 | Status: SHIPPED | OUTPATIENT
Start: 2017-04-18 | End: 2017-05-17 | Stop reason: SDUPTHER

## 2017-04-18 NOTE — PROGRESS NOTES
Subjective   Tiffanie Arroyo is a 89 y.o. female.     Back Pain   This is a chronic problem. The current episode started more than 1 year ago. The problem is unchanged. The pain is present in the lumbar spine and sacro-iliac. Quality: sharp minly. The pain does not radiate. The pain is at a severity of 8/10. The pain is moderate. The pain is the same all the time. The symptoms are aggravated by sitting and lying down. Stiffness is present in the morning. Pertinent negatives include no bladder incontinence, bowel incontinence or dysuria.   Knee Pain           The following portions of the patient's history were reviewed and updated as appropriate: allergies, current medications, past family history, past medical history, past social history, past surgical history and problem list.    Review of Systems   Gastrointestinal: Negative for bowel incontinence.   Genitourinary: Negative for bladder incontinence and dysuria.   Musculoskeletal: Positive for back pain.       Objective   Physical Exam   Constitutional: She is oriented to person, place, and time. She appears well-developed and well-nourished. No distress.   HENT:   Head: Normocephalic and atraumatic.   Musculoskeletal:        Lumbar back: She exhibits decreased range of motion, tenderness and pain. She exhibits no bony tenderness, no swelling, no edema, no deformity, no laceration and no spasm.       Vascular Status -  Her exam exhibits no right foot edema. Her exam exhibits no left foot edema.  Neurological: She is alert and oriented to person, place, and time.   Reflex Scores:       Patellar reflexes are 2+ on the right side and 2+ on the left side.  Skin: Skin is warm and dry. She is not diaphoretic.   Psychiatric: She has a normal mood and affect. Her behavior is normal. Judgment and thought content normal.   Nursing note and vitals reviewed.      Assessment/Plan   Problems Addressed this Visit        Other    Chronic midline low back pain without sciatica -  Primary

## 2017-05-03 ENCOUNTER — OFFICE VISIT (OUTPATIENT)
Dept: OPHTHALMOLOGY | Facility: CLINIC | Age: 82
End: 2017-05-03

## 2017-05-03 DIAGNOSIS — H40.1133 PRIMARY OPEN ANGLE GLAUCOMA OF BOTH EYES, SEVERE STAGE: Primary | ICD-10-CM

## 2017-05-03 DIAGNOSIS — Z96.1 PSEUDOPHAKIA: ICD-10-CM

## 2017-05-03 DIAGNOSIS — H35.3190 MACULAR DEGENERATION, DRY: ICD-10-CM

## 2017-05-03 DIAGNOSIS — H26.9 CATARACT: ICD-10-CM

## 2017-05-03 PROCEDURE — 99212 OFFICE O/P EST SF 10 MIN: CPT | Performed by: OPHTHALMOLOGY

## 2017-05-17 ENCOUNTER — OFFICE VISIT (OUTPATIENT)
Dept: FAMILY MEDICINE CLINIC | Facility: CLINIC | Age: 82
End: 2017-05-17

## 2017-05-17 VITALS
BODY MASS INDEX: 18.47 KG/M2 | OXYGEN SATURATION: 98 % | HEART RATE: 64 BPM | SYSTOLIC BLOOD PRESSURE: 112 MMHG | WEIGHT: 111 LBS | DIASTOLIC BLOOD PRESSURE: 60 MMHG

## 2017-05-17 DIAGNOSIS — I10 ESSENTIAL HYPERTENSION: ICD-10-CM

## 2017-05-17 DIAGNOSIS — G89.29 CHRONIC PAIN OF LEFT KNEE: ICD-10-CM

## 2017-05-17 DIAGNOSIS — M54.50 CHRONIC MIDLINE LOW BACK PAIN WITHOUT SCIATICA: ICD-10-CM

## 2017-05-17 DIAGNOSIS — I38 HEART VALVE DISORDER: ICD-10-CM

## 2017-05-17 DIAGNOSIS — I25.10 CORONARY ARTERY DISEASE INVOLVING NATIVE CORONARY ARTERY OF NATIVE HEART WITHOUT ANGINA PECTORIS: Primary | ICD-10-CM

## 2017-05-17 DIAGNOSIS — G89.29 CHRONIC MIDLINE LOW BACK PAIN WITHOUT SCIATICA: ICD-10-CM

## 2017-05-17 DIAGNOSIS — M25.562 CHRONIC PAIN OF LEFT KNEE: ICD-10-CM

## 2017-05-17 PROCEDURE — 99214 OFFICE O/P EST MOD 30 MIN: CPT | Performed by: FAMILY MEDICINE

## 2017-05-17 RX ORDER — HYDROCODONE BITARTRATE AND ACETAMINOPHEN 5; 325 MG/1; MG/1
1 TABLET ORAL EVERY 8 HOURS PRN
Qty: 90 TABLET | Refills: 0 | Status: SHIPPED | OUTPATIENT
Start: 2017-05-17 | End: 2017-06-20 | Stop reason: SDUPTHER

## 2017-06-15 ENCOUNTER — OFFICE VISIT (OUTPATIENT)
Dept: CARDIOLOGY | Facility: CLINIC | Age: 82
End: 2017-06-15

## 2017-06-15 VITALS
SYSTOLIC BLOOD PRESSURE: 136 MMHG | BODY MASS INDEX: 18.49 KG/M2 | DIASTOLIC BLOOD PRESSURE: 70 MMHG | HEART RATE: 64 BPM | WEIGHT: 111 LBS | HEIGHT: 65 IN

## 2017-06-15 DIAGNOSIS — I25.10 CORONARY ARTERY DISEASE INVOLVING NATIVE CORONARY ARTERY OF NATIVE HEART WITHOUT ANGINA PECTORIS: Primary | ICD-10-CM

## 2017-06-15 DIAGNOSIS — Z95.1 S/P CABG (CORONARY ARTERY BYPASS GRAFT): ICD-10-CM

## 2017-06-15 DIAGNOSIS — I10 ESSENTIAL HYPERTENSION: ICD-10-CM

## 2017-06-15 DIAGNOSIS — I48.92 ATRIAL FLUTTER, UNSPECIFIED TYPE (HCC): ICD-10-CM

## 2017-06-15 PROCEDURE — 99213 OFFICE O/P EST LOW 20 MIN: CPT | Performed by: INTERNAL MEDICINE

## 2017-06-15 NOTE — PROGRESS NOTES
Tiffanie Arroyo  89 y.o. female    06/15/2017  1. Coronary artery disease involving native coronary artery of native heart without angina pectoris    2. Essential hypertension    3. Atrial flutter, unspecified type    4. S/P CABG (coronary artery bypass graft)        History of Present Illness    Mrs. Arroyo is here for follow-up of her above stated problems.  From a symptom standpoint she has done quite well and denied any chest pain, shortness of breath, palpitation, dizziness or syncope.  She is seeing ophthalmology in [place] for eye surgery.  Her blood pressure was in the normal range.  No signs of congestive heart failure was noted.  She has been compliant with her medications.  She is unable to take Eliquis secondary to history of GI bleeding.        SUBJECTIVE    Allergies   Allergen Reactions   • Codeine    • Penicillins    • Sulfa Antibiotics          Past Medical History:   Diagnosis Date   • Abrasion and/ or friction burn of skin    • Abrasion of right hand, initial encounter- skin tear    • Acquired hypothyroidism    • Acute congestive heart failure    • Age-related physical debility    • Amblyopia- left    • Artificial eye lens in position- right    • Artificial lens present    • Coronary arteriosclerosis    • Coronary bypass graft finding    • Degenerative joint disease involving multiple joints     Degenerative joint disease, knees   • Dehydration    • Depressive disorder    • Dyslipidemia    • Ecchymosis     left cheek ecchymosis s/p fall   • Essential hypertension    • Generalized anxiety disorder    • Glaucoma    • Heart valve disorder     TR: severe MR: moderate RVSP: >60mmHg   • History of screening mammography    • Low back pain    • Malignant neoplasm of skin     Basal cell carcinoma, left nose.   • Nausea    • Need for immunization against influenza    • Nonrheumatic mitral valve insufficiency    • Nonrheumatic tricuspid valve disorder    • Nuclear senile cataract- left    • Osteoporosis      post foreto, followed by Dr. Salazar   • Other long-term (current) drug therapy    • Other Secondary pulmonary hypertension    • Peripheral edema    • Primary open angle glaucoma    • Repeated falls    • Restless legs    • Subdural hematoma    • Tear of skin- right anterior shin    • Unspecified Atrial flutter          Past Surgical History:   Procedure Laterality Date   • CATARACT EXTRACTION      right eye   •  SECTION      Classical  section;     • CHEST EXPLORATION      CAD.Unstable angina, Percutaneous coronary intervention May 2010 with instent restinosis, hypertension, hypothyroidism, anxiety restless leg syndrome, non-ST elevation MI . W/ pulmonary artery hemorrhage   • DENTAL PROCEDURE      Surgical removal of #16 with local anesthesia and IV sedation   • ENDOSCOPY AND COLONOSCOPY      Declined further colonoscopy   • EXCISION LESION      REMOVE LESION BACK OR FLANK - Seborrheic keratosis, excision times two   • EXPLORATORY LAPAROTOMY      Benign serous tumor, right ovary; small umbilical hernia. Bilateral salpingo-oophorectomy; repair or umbilical hernia   • HIP SURGERY      Left femoral neck fracture. Endoprothesis of the left hip   • HYSTERECTOMY      w/appendectomy   • NECK LESION BIOPSY      0.6 cm inclusion cyst, back of neck. Excision   • NECK SURGERY      Mass, right neck possible chemodactoma. Right carotid arteriogram   • OTHER SURGICAL HISTORY      Echocardiogram W/ color flow 23186 (1) - Mild pul.valvular regurg.Moderate mitral regurg.Right atrium moderately dilated.RV systolic pressure elevated.Severe tricuspid regurg.Left atrium moderate to severely dilated.EF 55-60%.Mlid aortic regurg.AV mildly sclerotic.   • OTHER SURGICAL HISTORY      Echocardiogram 19697 (1) - EF 50% Left atrium, mildly dilated. Moderate to severe mitral regurgitation. RV systolic pressure 60mmHg. No intracardiac mass pericardial effusion or cardiac thrombus seen   • SHOULDER SURGERY      Remove  shoulder lesion (1) - Excision of two shoulder lesions   • THYROIDECTOMY      Total thyroidectomy and right radical neck dissection.   • TUBAL ABDOMINAL LIGATION      [place] tubal ligation   • TUMOR REMOVAL      Remove tumor of back/flank - Excision of irritated hemangioma, 0.7 cm right bra line, 0.8 cm right flank and 0.7 cm left bra line         Family History   Problem Relation Age of Onset   • Heart disease Other    • Hypertension Other          Social History     Social History   • Marital status:      Spouse name: N/A   • Number of children: N/A   • Years of education: N/A     Occupational History   • Not on file.     Social History Main Topics   • Smoking status: Never Smoker   • Smokeless tobacco: Never Used   • Alcohol use No   • Drug use: No   • Sexual activity: Defer     Other Topics Concern   • Not on file     Social History Narrative         Current Outpatient Prescriptions   Medication Sig Dispense Refill   • ALPHAGAN P 0.1 % solution ophthalmic solution Administer 1 drop to both eyes 3 (Three) Times a Day. 5 mL 5   • aspirin 81 MG EC tablet Take 1 tablet by mouth daily. 30 tablet 11   • carvedilol (COREG) 6.25 MG tablet Take 1 tablet by mouth 2 (Two) Times a Day With Meals. 60 tablet 11   •  MG capsule TAKE 1 CAPSULE TWICE DAILY 60 capsule 5   • furosemide (LASIX) 20 MG tablet TAKE 2 TABLETS EACH MORNING. 60 tablet 10   • HYDROcodone-acetaminophen (NORCO) 5-325 MG per tablet Take 1 tablet by mouth Every 8 (Eight) Hours As Needed (back pain). 90 tablet 0   • latanoprost (XALATAN) 0.005 % ophthalmic solution Administer 1 drop to both eyes Every Night. 2.5 mL 5   • levothyroxine (SYNTHROID, LEVOTHROID) 112 MCG tablet TAKE 1 TAB DAILY FOR THYROID, ON EMPTY STOMACH -- EITHER 1 HOUR BEFORE EATING OR 2 HOURS AFTER 30 tablet 7   • lisinopril (PRINIVIL,ZESTRIL) 10 MG tablet Take 10 mg by mouth 2 (two) times a day.     • ondansetron (ZOFRAN) 4 MG tablet Take 1 tablet by mouth Every 8 (Eight)  "Hours As Needed for Nausea or Vomiting. 20 tablet 5   • rOPINIRole (REQUIP) 0.5 MG tablet Take 1 tablet by mouth every night. 90 tablet 5   • sertraline (ZOLOFT) 50 MG tablet Take 1 tablet by mouth Daily. 30 tablet 11     No current facility-administered medications for this visit.          OBJECTIVE    /70  Pulse 64  Ht 65\" (165.1 cm)  Wt 111 lb (50.3 kg)  BMI 18.47 kg/m2        Review of Systems     Constitutional:  Denies recent weight loss, weight gain, fever or chills    HENT:  Denies any hearing loss, epistaxis, hoarseness, or difficulty speaking.     Eyes: Wears eyeglasses or contact lenses     Respiratory:  Denies dyspnea with exertion,no cough, wheezing, or hemoptysis.     Cardiovascular: Negative for palpations, chest pain, orthopnea, PND    Gastrointestinal:  Denies change in bowel habits, dyspepsia, ulcer disease, hematochezia, or melena.     Endocrine: Negative for cold intolerance, heat intolerance, polydipsia, polyphagia and polyuria.     Genitourinary: Negative.      Musculoskeletal: DJD        Physical Exam     Constitutional: Cooperative, alert and oriented, in no acute distress.     HENT:   Head: Normocephalic, normal hair patterns, no masses or tenderness.  Ears, Nose, and Throat: No gross abnormalities. No pallor or cyanosis.  Eyes: EOMS intact, PERRL, conjunctivae and lids unremarkable. Fundoscopic exam and visual fields not performed.   Neck: No palpable masses or adenopathy, no thyromegaly, no JVD, carotid pulses are full and equal bilaterally and without  Bruits.     Cardiovascular: Regular rhythm, S1 and S2 normal, no S3 or S4.  3/6 systolic murmurs, gallops, or rubs detected.     Pulmonary/Chest: Chest: normal symmetry, no tenderness to palpation, normal respiratory excursion, no intercostal retraction, no use of accessory muscles.            Pulmonary: Normal breath sounds. No rales or ronchi.    Abdominal: Abdomen soft, bowel sounds normoactive, no masses, no " hepatosplenomegaly, non-tender, no bruits.     Musculoskeletal: No deformities, clubbing, cyanosis, erythema, or edema observed.     Procedures      Lab Results   Component Value Date    WBC 6.26 02/20/2017    HGB 12.0 02/20/2017    HCT 36.9 02/20/2017    MCV 91.8 02/20/2017     02/20/2017     Lab Results   Component Value Date    GLUCOSE 115 (H) 02/20/2017    BUN 23 (H) 02/20/2017    CREATININE 0.75 02/20/2017    EGFRIFNONA 73 02/20/2017    BCR 30.7 (H) 02/20/2017    CO2 29.0 02/20/2017    CALCIUM 8.5 02/20/2017    ALBUMIN 3.80 02/20/2017    LABIL2 1.2 02/20/2017    AST 39 (H) 02/20/2017    ALT 23 02/20/2017     Lab Results   Component Value Date    CHOL 141 02/20/2017     Lab Results   Component Value Date    TRIG 65 02/20/2017    TRIG 110 02/19/2015    TRIG 128 06/25/2014     Lab Results   Component Value Date    HDL 50 (L) 02/20/2017    HDL 61 02/19/2015    HDL 60 06/25/2014     Lab Results   Component Value Date    LDLCALC 42 02/19/2015    LDLCALC 64 06/25/2014     No results found for: LDL  No results found for: HDLLDLRATIO  No components found for: CHOLHDL  No results found for: HGBA1C  Lab Results   Component Value Date    TSH 0.200 (L) 02/20/2017           ASSESSMENT AND PLAN  Mrs. Arroyo is stable with no evidence of progression of coronary artery disease.  No signs of congestive heart failure was noted.  Antiplatelet therapy with aspirin, Coreg, Lasix, lisinopril have been continued.  She'll be seen in 6    Diagnoses and all orders for this visit:    Coronary artery disease involving native coronary artery of native heart without angina pectoris    Essential hypertension    Atrial flutter, unspecified type    S/P CABG (coronary artery bypass graft)        Jose Chao MD  6/15/2017  2:48 PM

## 2017-06-20 ENCOUNTER — OFFICE VISIT (OUTPATIENT)
Dept: FAMILY MEDICINE CLINIC | Facility: CLINIC | Age: 82
End: 2017-06-20

## 2017-06-20 VITALS
SYSTOLIC BLOOD PRESSURE: 124 MMHG | WEIGHT: 111 LBS | HEIGHT: 65 IN | DIASTOLIC BLOOD PRESSURE: 74 MMHG | OXYGEN SATURATION: 97 % | HEART RATE: 62 BPM | BODY MASS INDEX: 18.49 KG/M2

## 2017-06-20 DIAGNOSIS — M54.50 CHRONIC MIDLINE LOW BACK PAIN WITHOUT SCIATICA: Primary | ICD-10-CM

## 2017-06-20 DIAGNOSIS — G89.29 CHRONIC MIDLINE LOW BACK PAIN WITHOUT SCIATICA: Primary | ICD-10-CM

## 2017-06-20 PROCEDURE — 99213 OFFICE O/P EST LOW 20 MIN: CPT | Performed by: FAMILY MEDICINE

## 2017-06-20 RX ORDER — HYDROCODONE BITARTRATE AND ACETAMINOPHEN 5; 325 MG/1; MG/1
1 TABLET ORAL EVERY 8 HOURS PRN
Qty: 90 TABLET | Refills: 0 | Status: SHIPPED | OUTPATIENT
Start: 2017-06-20 | End: 2017-07-20 | Stop reason: SDUPTHER

## 2017-06-22 RX ORDER — LISINOPRIL 10 MG/1
10 TABLET ORAL DAILY
Qty: 90 TABLET | Refills: 2 | Status: SHIPPED | OUTPATIENT
Start: 2017-06-22 | End: 2018-01-01 | Stop reason: SDUPTHER

## 2017-06-23 RX ORDER — DOCUSATE SODIUM 100 MG/1
100 CAPSULE, LIQUID FILLED ORAL 2 TIMES DAILY
Qty: 60 CAPSULE | Refills: 5 | Status: SHIPPED | OUTPATIENT
Start: 2017-06-23 | End: 2018-01-01 | Stop reason: SDUPTHER

## 2017-06-26 RX ORDER — HYDROCODONE BITARTRATE AND ACETAMINOPHEN 5; 325 MG/1; MG/1
TABLET ORAL
Qty: 90 TABLET | OUTPATIENT
Start: 2017-06-26

## 2017-07-20 ENCOUNTER — OFFICE VISIT (OUTPATIENT)
Dept: FAMILY MEDICINE CLINIC | Facility: CLINIC | Age: 82
End: 2017-07-20

## 2017-07-20 VITALS
BODY MASS INDEX: 19.26 KG/M2 | HEART RATE: 60 BPM | DIASTOLIC BLOOD PRESSURE: 60 MMHG | OXYGEN SATURATION: 99 % | WEIGHT: 115.6 LBS | SYSTOLIC BLOOD PRESSURE: 118 MMHG | HEIGHT: 65 IN

## 2017-07-20 DIAGNOSIS — G89.29 CHRONIC PAIN OF LEFT KNEE: ICD-10-CM

## 2017-07-20 DIAGNOSIS — M25.562 CHRONIC PAIN OF LEFT KNEE: ICD-10-CM

## 2017-07-20 DIAGNOSIS — H40.10X0 OPEN-ANGLE GLAUCOMA, UNSPECIFIED GLAUCOMA STAGE, UNSPECIFIED LATERALITY, UNSPECIFIED OPEN-ANGLE GLAUCOMA TYPE: Primary | ICD-10-CM

## 2017-07-20 PROCEDURE — 99213 OFFICE O/P EST LOW 20 MIN: CPT | Performed by: FAMILY MEDICINE

## 2017-07-20 PROCEDURE — 96372 THER/PROPH/DIAG INJ SC/IM: CPT | Performed by: FAMILY MEDICINE

## 2017-07-20 RX ORDER — TRIAMCINOLONE ACETONIDE 40 MG/ML
80 INJECTION, SUSPENSION INTRA-ARTICULAR; INTRAMUSCULAR ONCE
Status: COMPLETED | OUTPATIENT
Start: 2017-07-20 | End: 2017-07-20

## 2017-07-20 RX ORDER — HYDROCODONE BITARTRATE AND ACETAMINOPHEN 5; 325 MG/1; MG/1
1 TABLET ORAL EVERY 8 HOURS PRN
Qty: 90 TABLET | Refills: 0 | Status: SHIPPED | OUTPATIENT
Start: 2017-07-20 | End: 2017-08-22 | Stop reason: SDUPTHER

## 2017-07-20 RX ORDER — HYDROCODONE BITARTRATE AND ACETAMINOPHEN 5; 325 MG/1; MG/1
1 TABLET ORAL EVERY 8 HOURS PRN
Qty: 90 TABLET | Refills: 0 | Status: SHIPPED | OUTPATIENT
Start: 2017-07-20 | End: 2017-07-20 | Stop reason: SDUPTHER

## 2017-07-20 RX ADMIN — TRIAMCINOLONE ACETONIDE 80 MG: 40 INJECTION, SUSPENSION INTRA-ARTICULAR; INTRAMUSCULAR at 15:48

## 2017-07-20 NOTE — PROGRESS NOTES
Subjective   Tiffanie Arroyo is a 89 y.o. female.     Back Pain   This is a chronic problem. The current episode started more than 1 year ago. The problem is unchanged. The pain is present in the lumbar spine and sacro-iliac. Quality: sharp minly. The pain does not radiate. The pain is at a severity of 8/10. The pain is moderate. The pain is the same all the time. The symptoms are aggravated by sitting and lying down. Stiffness is present in the morning. Pertinent negatives include no bladder incontinence, bowel incontinence or dysuria.   Knee Pain    The incident occurred more than 1 week ago. The pain is present in the left knee and right knee. The pain is at a severity of 9/10.        The following portions of the patient's history were reviewed and updated as appropriate: allergies, current medications, past family history, past medical history, past social history, past surgical history and problem list.    Review of Systems   Gastrointestinal: Negative for bowel incontinence.   Genitourinary: Negative for bladder incontinence and dysuria.   Musculoskeletal: Positive for back pain.       Objective   Physical Exam   Constitutional: She is oriented to person, place, and time. She appears well-developed and well-nourished. No distress.   HENT:   Head: Normocephalic and atraumatic.   Musculoskeletal:        Left knee: She exhibits decreased range of motion and swelling. Tenderness found.        Lumbar back: She exhibits decreased range of motion, tenderness and pain. She exhibits no bony tenderness, no swelling, no edema, no deformity, no laceration and no spasm.       Vascular Status -  Her exam exhibits no right foot edema. Her exam exhibits no left foot edema.  Neurological: She is alert and oriented to person, place, and time.   Reflex Scores:       Patellar reflexes are 2+ on the right side and 2+ on the left side.  Skin: Skin is warm and dry. She is not diaphoretic.   Psychiatric: She has a normal mood and  affect. Her behavior is normal. Judgment and thought content normal.   Nursing note and vitals reviewed.      Assessment/Plan   Problems Addressed this Visit        Other    Glaucoma - Primary    Relevant Orders    Ambulatory Referral to Ophthalmology (Completed)

## 2017-08-02 ENCOUNTER — CLINICAL SUPPORT (OUTPATIENT)
Dept: ORTHOPEDIC SURGERY | Facility: CLINIC | Age: 82
End: 2017-08-02

## 2017-08-02 DIAGNOSIS — M81.0 OSTEOPOROSIS: Primary | ICD-10-CM

## 2017-08-02 PROCEDURE — 96372 THER/PROPH/DIAG INJ SC/IM: CPT | Performed by: NURSE PRACTITIONER

## 2017-08-02 NOTE — PROGRESS NOTES
Chief Complaint   Patient presents with   • Osteoporosis   • Injections     patient in today for prolia injection.      Abilio Bhardwaj MD    HPI:  Patient returns for a prolia injection.  No new complaints      Current Outpatient Prescriptions:   •  ALPHAGAN P 0.1 % solution ophthalmic solution, Administer 1 drop to both eyes 3 (Three) Times a Day., Disp: 5 mL, Rfl: 5  •  aspirin 81 MG EC tablet, Take 1 tablet by mouth daily., Disp: 30 tablet, Rfl: 11  •  carvedilol (COREG) 6.25 MG tablet, Take 1 tablet by mouth 2 (Two) Times a Day With Meals., Disp: 60 tablet, Rfl: 11  •  docusate sodium (DOK) 100 MG capsule, Take 1 capsule by mouth 2 (Two) Times a Day., Disp: 60 capsule, Rfl: 5  •  furosemide (LASIX) 20 MG tablet, TAKE 2 TABLETS EACH MORNING., Disp: 60 tablet, Rfl: 10  •  HYDROcodone-acetaminophen (NORCO) 5-325 MG per tablet, Take 1 tablet by mouth Every 8 (Eight) Hours As Needed (back pain)., Disp: 90 tablet, Rfl: 0  •  latanoprost (XALATAN) 0.005 % ophthalmic solution, Administer 1 drop to both eyes Every Night., Disp: 2.5 mL, Rfl: 5  •  levothyroxine (SYNTHROID, LEVOTHROID) 112 MCG tablet, TAKE 1 TAB DAILY FOR THYROID, ON EMPTY STOMACH -- EITHER 1 HOUR BEFORE EATING OR 2 HOURS AFTER, Disp: 30 tablet, Rfl: 7  •  lisinopril (PRINIVIL,ZESTRIL) 10 MG tablet, Take 1 tablet by mouth Daily., Disp: 90 tablet, Rfl: 2  •  ondansetron (ZOFRAN) 4 MG tablet, Take 1 tablet by mouth Every 8 (Eight) Hours As Needed for Nausea or Vomiting., Disp: 20 tablet, Rfl: 5  •  rOPINIRole (REQUIP) 0.5 MG tablet, Take 1 tablet by mouth every night., Disp: 90 tablet, Rfl: 5  •  sertraline (ZOLOFT) 50 MG tablet, Take 1 tablet by mouth Daily., Disp: 30 tablet, Rfl: 11    Allergies   Allergen Reactions   • Codeine    • Penicillins    • Sulfa Antibiotics          There were no vitals filed for this visit.      ASSESSMENT:    Diagnoses and all orders for this visit:    Osteoporosis        Plan:    The medicine was supplied by the  patient  The patient was injected, under aseptic conditions, into the:    []  Right arm    []  Left arm  []  Right thigh    []  Left thigh  []  Right side of abdomen  [x]  Left side of abdomen    The patient tolerated the procedure well.  Plan Followup in 6 months for next injection.    NDC #: 42760-478-03      [id number]  08/03/17 at 8:12 AM by RAMÓN Reyes

## 2017-08-14 DIAGNOSIS — H40.10X0 OPEN-ANGLE GLAUCOMA OF BOTH EYES, UNSPECIFIED GLAUCOMA STAGE, UNSPECIFIED OPEN-ANGLE GLAUCOMA TYPE: ICD-10-CM

## 2017-08-14 RX ORDER — LATANOPROST 50 UG/ML
1 SOLUTION/ DROPS OPHTHALMIC NIGHTLY
Qty: 2.5 ML | Refills: 1 | Status: SHIPPED | OUTPATIENT
Start: 2017-08-14

## 2017-08-22 RX ORDER — HYDROCODONE BITARTRATE AND ACETAMINOPHEN 5; 325 MG/1; MG/1
1 TABLET ORAL EVERY 8 HOURS PRN
Qty: 90 TABLET | Refills: 0 | Status: SHIPPED | OUTPATIENT
Start: 2017-08-22 | End: 2017-09-13 | Stop reason: SDUPTHER

## 2017-09-13 ENCOUNTER — APPOINTMENT (OUTPATIENT)
Dept: LAB | Facility: HOSPITAL | Age: 82
End: 2017-09-13

## 2017-09-13 ENCOUNTER — TELEPHONE (OUTPATIENT)
Dept: FAMILY MEDICINE CLINIC | Facility: CLINIC | Age: 82
End: 2017-09-13

## 2017-09-13 ENCOUNTER — OFFICE VISIT (OUTPATIENT)
Dept: FAMILY MEDICINE CLINIC | Facility: CLINIC | Age: 82
End: 2017-09-13

## 2017-09-13 VITALS
WEIGHT: 115 LBS | HEART RATE: 54 BPM | DIASTOLIC BLOOD PRESSURE: 64 MMHG | SYSTOLIC BLOOD PRESSURE: 120 MMHG | HEIGHT: 65 IN | OXYGEN SATURATION: 97 % | BODY MASS INDEX: 19.16 KG/M2

## 2017-09-13 DIAGNOSIS — R79.89 LOW SERUM CALCIUM: Primary | ICD-10-CM

## 2017-09-13 DIAGNOSIS — I10 ESSENTIAL HYPERTENSION: Primary | ICD-10-CM

## 2017-09-13 DIAGNOSIS — M54.50 CHRONIC MIDLINE LOW BACK PAIN WITHOUT SCIATICA: ICD-10-CM

## 2017-09-13 DIAGNOSIS — Z23 INFLUENZA VACCINATION ADMINISTERED AT CURRENT VISIT: ICD-10-CM

## 2017-09-13 DIAGNOSIS — I25.10 CORONARY ARTERY DISEASE INVOLVING NATIVE CORONARY ARTERY OF NATIVE HEART WITHOUT ANGINA PECTORIS: ICD-10-CM

## 2017-09-13 DIAGNOSIS — E03.9 ACQUIRED HYPOTHYROIDISM: ICD-10-CM

## 2017-09-13 DIAGNOSIS — E83.51 LOW CALCIUM LEVELS: Primary | ICD-10-CM

## 2017-09-13 DIAGNOSIS — G89.29 CHRONIC MIDLINE LOW BACK PAIN WITHOUT SCIATICA: ICD-10-CM

## 2017-09-13 LAB
ALBUMIN SERPL-MCNC: 3.9 G/DL (ref 3.4–4.8)
ALBUMIN/GLOB SERPL: 1.3 G/DL (ref 1.1–1.8)
ALP SERPL-CCNC: 74 U/L (ref 38–126)
ALT SERPL W P-5'-P-CCNC: 27 U/L (ref 9–52)
ANION GAP SERPL CALCULATED.3IONS-SCNC: 10 MMOL/L (ref 5–15)
AST SERPL-CCNC: 24 U/L (ref 14–36)
BILIRUB SERPL-MCNC: 0.6 MG/DL (ref 0.2–1.3)
BUN BLD-MCNC: 22 MG/DL (ref 7–21)
BUN/CREAT SERPL: 27.2 (ref 7–25)
CALCIUM SPEC-SCNC: 7.6 MG/DL (ref 8.4–10.2)
CHLORIDE SERPL-SCNC: 105 MMOL/L (ref 95–110)
CO2 SERPL-SCNC: 25 MMOL/L (ref 22–31)
CREAT BLD-MCNC: 0.81 MG/DL (ref 0.5–1)
GFR SERPL CREATININE-BSD FRML MDRD: 66 ML/MIN/1.73 (ref 39–90)
GLOBULIN UR ELPH-MCNC: 3 GM/DL (ref 2.3–3.5)
GLUCOSE BLD-MCNC: 97 MG/DL (ref 60–100)
POTASSIUM BLD-SCNC: 3.8 MMOL/L (ref 3.5–5.1)
PROT SERPL-MCNC: 6.9 G/DL (ref 6.3–8.6)
SODIUM BLD-SCNC: 140 MMOL/L (ref 137–145)
T4 FREE SERPL-MCNC: 2.18 NG/DL (ref 0.78–2.19)
TSH SERPL DL<=0.05 MIU/L-ACNC: <0.02 MIU/ML (ref 0.46–4.68)

## 2017-09-13 PROCEDURE — 84439 ASSAY OF FREE THYROXINE: CPT | Performed by: FAMILY MEDICINE

## 2017-09-13 PROCEDURE — 99214 OFFICE O/P EST MOD 30 MIN: CPT | Performed by: FAMILY MEDICINE

## 2017-09-13 PROCEDURE — 36415 COLL VENOUS BLD VENIPUNCTURE: CPT | Performed by: FAMILY MEDICINE

## 2017-09-13 PROCEDURE — 80053 COMPREHEN METABOLIC PANEL: CPT | Performed by: FAMILY MEDICINE

## 2017-09-13 PROCEDURE — 90686 IIV4 VACC NO PRSV 0.5 ML IM: CPT | Performed by: FAMILY MEDICINE

## 2017-09-13 PROCEDURE — G0008 ADMIN INFLUENZA VIRUS VAC: HCPCS | Performed by: FAMILY MEDICINE

## 2017-09-13 PROCEDURE — 84443 ASSAY THYROID STIM HORMONE: CPT | Performed by: FAMILY MEDICINE

## 2017-09-13 RX ORDER — CALCIUM CARBONATE 200(500)MG
1 TABLET,CHEWABLE ORAL 3 TIMES DAILY
Qty: 90 TABLET | Refills: 5 | Status: SHIPPED | OUTPATIENT
Start: 2017-09-13 | End: 2017-11-24 | Stop reason: ALTCHOICE

## 2017-09-13 RX ORDER — HYDROCODONE BITARTRATE AND ACETAMINOPHEN 5; 325 MG/1; MG/1
1 TABLET ORAL EVERY 8 HOURS PRN
Qty: 90 TABLET | Refills: 0 | Status: SHIPPED | OUTPATIENT
Start: 2017-09-13 | End: 2017-10-11 | Stop reason: SDUPTHER

## 2017-09-13 NOTE — PROGRESS NOTES
Calcium low.  Start calcium carbonate 500 mg 3 times a day.  Continue to do with pills or as Tums 2 weeks.  Recheck calcium and PTH in one month.

## 2017-09-13 NOTE — PROGRESS NOTES
Pr Dr. Bhardwaj, Mr. Arroyo, her son has been called with Ms. Arroyo's recent lab results & recommendations.  Continue her current medications and follow-up as planned or sooner if any problems.

## 2017-09-13 NOTE — PROGRESS NOTES
Subjective   Tiffanie Arroyo is a 89 y.o. female.     Back Pain   This is a chronic problem. The current episode started more than 1 year ago. The problem is unchanged. The pain is present in the lumbar spine and sacro-iliac. Quality: sharp minly. The pain does not radiate. The pain is at a severity of 8/10. The pain is moderate. The pain is the same all the time. The symptoms are aggravated by sitting and lying down. Pertinent negatives include no bladder incontinence, bowel incontinence, chest pain or fever.   Knee Pain    The pain is present in the left knee. Associated symptoms include a loss of motion and muscle weakness.   Hypertension   This is a chronic problem. The current episode started more than 1 year ago. The problem is unchanged. The problem is controlled. Pertinent negatives include no chest pain, orthopnea, palpitations, peripheral edema, PND or shortness of breath.   Hypothyroidism   This is a chronic problem. The current episode started more than 1 year ago. The problem has been unchanged. Pertinent negatives include no chest pain or fever.   Depression   Visit Type: follow-up  Patient is not experiencing: depressed mood, insomnia, nervousness/anxiety, palpitations, shortness of breath, suicidal ideas, suicidal planning and thoughts of death.             Review of Systems   Constitutional: Negative for fever.   Respiratory: Negative for shortness of breath.    Cardiovascular: Negative for chest pain, palpitations, orthopnea and PND.   Gastrointestinal: Negative for bowel incontinence.   Genitourinary: Negative for bladder incontinence.   Musculoskeletal: Positive for back pain.   Psychiatric/Behavioral: Negative for suicidal ideas. The patient is not nervous/anxious and does not have insomnia.        Objective   Physical Exam   Constitutional: She is oriented to person, place, and time. She appears well-developed and well-nourished. No distress.   HENT:   Head: Normocephalic and atraumatic.    Cardiovascular: Normal rate.  Exam reveals friction rub. Exam reveals no gallop.    Murmur heard.   Systolic murmur is present with a grade of 2/6   Pulmonary/Chest: Effort normal and breath sounds normal.   Abdominal: Bowel sounds are normal. There is no tenderness.   Musculoskeletal:        Left knee: She exhibits decreased range of motion and swelling. Tenderness found.        Lumbar back: She exhibits tenderness and pain. She exhibits no bony tenderness, no swelling, no edema, no deformity, no laceration and no spasm.   Neurological: She is alert and oriented to person, place, and time.   Skin: Skin is warm and dry. She is not diaphoretic.   Psychiatric: She has a normal mood and affect. Her speech is normal and behavior is normal. Judgment and thought content normal.   Nursing note and vitals reviewed.      Assessment/Plan   Problems Addressed this Visit        Cardiovascular and Mediastinum    Coronary artery disease involving native coronary artery of native heart without angina pectoris    Essential hypertension - Primary       Endocrine    Acquired hypothyroidism       Other    Chronic midline low back pain without sciatica

## 2017-09-13 NOTE — TELEPHONE ENCOUNTER
Pr Dr. Bhardwaj, Mr. Arroyo, her son has been called with Ms. Arroyo's recent lab results & recommendations.  Continue her current medications and follow-up as planned or sooner if any problems.      ----- Message from Abilio Bhardwaj MD sent at 9/13/2017  3:00 PM CDT -----  Calcium low.  Start calcium carbonate 500 mg 3 times a day.  Continue to do with pills or as Tums 2 weeks.  Recheck calcium and PTH in one month.

## 2017-09-18 ENCOUNTER — TELEPHONE (OUTPATIENT)
Dept: FAMILY MEDICINE CLINIC | Facility: CLINIC | Age: 82
End: 2017-09-18

## 2017-09-18 RX ORDER — ROPINIROLE 0.5 MG/1
TABLET, FILM COATED ORAL
Qty: 30 TABLET | Refills: 4 | Status: CANCELLED | OUTPATIENT
Start: 2017-09-18

## 2017-09-18 RX ORDER — ROPINIROLE 0.5 MG/1
0.5 TABLET, FILM COATED ORAL NIGHTLY
Qty: 90 TABLET | Refills: 3 | Status: SHIPPED | OUTPATIENT
Start: 2017-09-18 | End: 2018-01-01 | Stop reason: SDUPTHER

## 2017-10-02 NOTE — PROGRESS NOTES
Patient notified that future prolia injections will be done through the [facility] center.

## 2017-10-03 DIAGNOSIS — H40.10X0 OPEN-ANGLE GLAUCOMA OF BOTH EYES, UNSPECIFIED GLAUCOMA STAGE, UNSPECIFIED OPEN-ANGLE GLAUCOMA TYPE: ICD-10-CM

## 2017-10-03 RX ORDER — BRIMONIDINE TARTRATE 0.1 %
1 DROPS OPHTHALMIC (EYE) 3 TIMES DAILY
Qty: 5 ML | Refills: 5 | Status: SHIPPED | OUTPATIENT
Start: 2017-10-03 | End: 2018-01-01 | Stop reason: SDUPTHER

## 2017-10-11 ENCOUNTER — LAB (OUTPATIENT)
Dept: LAB | Facility: HOSPITAL | Age: 82
End: 2017-10-11

## 2017-10-11 ENCOUNTER — OFFICE VISIT (OUTPATIENT)
Dept: FAMILY MEDICINE CLINIC | Facility: CLINIC | Age: 82
End: 2017-10-11

## 2017-10-11 VITALS
WEIGHT: 117.6 LBS | HEIGHT: 65 IN | DIASTOLIC BLOOD PRESSURE: 70 MMHG | BODY MASS INDEX: 19.59 KG/M2 | HEART RATE: 52 BPM | SYSTOLIC BLOOD PRESSURE: 112 MMHG | OXYGEN SATURATION: 99 %

## 2017-10-11 DIAGNOSIS — E83.51 LOW CALCIUM LEVELS: ICD-10-CM

## 2017-10-11 DIAGNOSIS — G89.29 CHRONIC PAIN OF LEFT KNEE: Primary | ICD-10-CM

## 2017-10-11 DIAGNOSIS — G89.29 CHRONIC MIDLINE LOW BACK PAIN WITHOUT SCIATICA: ICD-10-CM

## 2017-10-11 DIAGNOSIS — M54.50 CHRONIC MIDLINE LOW BACK PAIN WITHOUT SCIATICA: ICD-10-CM

## 2017-10-11 DIAGNOSIS — M25.562 CHRONIC PAIN OF LEFT KNEE: Primary | ICD-10-CM

## 2017-10-11 LAB
CALCIUM SPEC-SCNC: 10.1 MG/DL (ref 8.4–10.2)
PTH-INTACT SERPL-MCNC: 25.2 PG/ML (ref 10–65)

## 2017-10-11 PROCEDURE — 36415 COLL VENOUS BLD VENIPUNCTURE: CPT

## 2017-10-11 PROCEDURE — 99214 OFFICE O/P EST MOD 30 MIN: CPT | Performed by: FAMILY MEDICINE

## 2017-10-11 PROCEDURE — 83970 ASSAY OF PARATHORMONE: CPT | Performed by: FAMILY MEDICINE

## 2017-10-11 PROCEDURE — 82310 ASSAY OF CALCIUM: CPT | Performed by: FAMILY MEDICINE

## 2017-10-11 RX ORDER — LEVOTHYROXINE SODIUM 112 UG/1
TABLET ORAL
Qty: 30 TABLET | Refills: 7 | Status: SHIPPED | OUTPATIENT
Start: 2017-10-11 | End: 2018-01-01 | Stop reason: SDUPTHER

## 2017-10-11 RX ORDER — HYDROCODONE BITARTRATE AND ACETAMINOPHEN 5; 325 MG/1; MG/1
1 TABLET ORAL EVERY 8 HOURS PRN
Qty: 90 TABLET | Refills: 0 | Status: SHIPPED | OUTPATIENT
Start: 2017-10-11 | End: 2017-11-15 | Stop reason: SDUPTHER

## 2017-10-11 NOTE — PROGRESS NOTES
Subjective   Tiffanie Arroyo is a 89 y.o. female.     Back Pain   This is a chronic problem. The current episode started more than 1 year ago. The problem is unchanged. The pain is present in the lumbar spine and sacro-iliac. Quality: sharp minly. The pain does not radiate. The pain is at a severity of 8/10. The pain is moderate. The pain is the same all the time. The symptoms are aggravated by sitting and lying down. Stiffness is present in the morning. Pertinent negatives include no bladder incontinence, bowel incontinence or dysuria.   Hypertension     Knee Pain    The incident occurred more than 1 week ago. The pain is present in the left knee and right knee. The pain is at a severity of 9/10.        The following portions of the patient's history were reviewed and updated as appropriate: allergies, current medications, past family history, past medical history, past social history, past surgical history and problem list.    Review of Systems   Gastrointestinal: Negative for bowel incontinence.   Genitourinary: Negative for bladder incontinence and dysuria.   Musculoskeletal: Positive for back pain.       Objective   Physical Exam   Constitutional: She is oriented to person, place, and time. She appears well-developed and well-nourished. No distress.   HENT:   Head: Normocephalic and atraumatic.   Musculoskeletal:        Left knee: She exhibits decreased range of motion and swelling. Tenderness found.        Lumbar back: She exhibits decreased range of motion, tenderness and pain. She exhibits no bony tenderness, no swelling, no edema, no deformity, no laceration and no spasm.       Vascular Status -  Her exam exhibits no right foot edema. Her exam exhibits no left foot edema.  Neurological: She is alert and oriented to person, place, and time.   Reflex Scores:       Patellar reflexes are 2+ on the right side and 2+ on the left side.  Skin: Skin is warm and dry. She is not diaphoretic.   Psychiatric: She has a  normal mood and affect. Her behavior is normal. Judgment and thought content normal.   Nursing note and vitals reviewed.      Assessment/Plan   Problems Addressed this Visit        Nervous and Auditory    Chronic midline low back pain without sciatica       Musculoskeletal and Integument    Chronic pain of left knee - Primary

## 2017-10-12 ENCOUNTER — TELEPHONE (OUTPATIENT)
Dept: FAMILY MEDICINE CLINIC | Facility: CLINIC | Age: 82
End: 2017-10-12

## 2017-10-12 NOTE — TELEPHONE ENCOUNTER
Pr Dr. Bhardwaj, Ms. Arroyo's Son has been called with her recent lab results & recommendations.  Continue her current medications and follow-up as planned or sooner if any problems.      ----- Message from Abilio Bhardwaj MD sent at 10/12/2017  1:12 PM CDT -----  Ok, call or send card.

## 2017-10-12 NOTE — PROGRESS NOTES
Pr Dr. Bhardwaj, Ms. Arroyo's Son has been called with her recent lab results & recommendations.  Continue her current medications and follow-up as planned or sooner if any problems.

## 2017-11-15 ENCOUNTER — OFFICE VISIT (OUTPATIENT)
Dept: FAMILY MEDICINE CLINIC | Facility: CLINIC | Age: 82
End: 2017-11-15

## 2017-11-15 VITALS
HEIGHT: 65 IN | WEIGHT: 115 LBS | SYSTOLIC BLOOD PRESSURE: 110 MMHG | OXYGEN SATURATION: 100 % | DIASTOLIC BLOOD PRESSURE: 60 MMHG | BODY MASS INDEX: 19.16 KG/M2 | HEART RATE: 60 BPM

## 2017-11-15 DIAGNOSIS — G89.29 CHRONIC MIDLINE LOW BACK PAIN WITHOUT SCIATICA: ICD-10-CM

## 2017-11-15 DIAGNOSIS — M25.562 CHRONIC PAIN OF LEFT KNEE: Primary | ICD-10-CM

## 2017-11-15 DIAGNOSIS — G89.29 CHRONIC PAIN OF LEFT KNEE: Primary | ICD-10-CM

## 2017-11-15 DIAGNOSIS — M54.50 CHRONIC MIDLINE LOW BACK PAIN WITHOUT SCIATICA: ICD-10-CM

## 2017-11-15 PROCEDURE — 99213 OFFICE O/P EST LOW 20 MIN: CPT | Performed by: FAMILY MEDICINE

## 2017-11-15 RX ORDER — HYDROCODONE BITARTRATE AND ACETAMINOPHEN 5; 325 MG/1; MG/1
1 TABLET ORAL EVERY 8 HOURS PRN
Qty: 90 TABLET | Refills: 0 | Status: SHIPPED | OUTPATIENT
Start: 2017-11-15 | End: 2017-12-05 | Stop reason: SDUPTHER

## 2017-11-15 NOTE — PROGRESS NOTES
Subjective   Tiffanie Arroyo is a 89 y.o. female.     Leg Pain      Back Pain   This is a chronic problem. The current episode started more than 1 year ago. The problem is unchanged. The pain is present in the lumbar spine and sacro-iliac. Quality: sharp minly. The pain does not radiate. The pain is at a severity of 8/10. The pain is moderate. The pain is the same all the time. The symptoms are aggravated by sitting and lying down. Stiffness is present in the morning. Associated symptoms include leg pain. Pertinent negatives include no bladder incontinence, bowel incontinence or dysuria.   Knee Pain    The incident occurred more than 1 week ago. The pain is present in the left knee and right knee. The pain is at a severity of 9/10.        The following portions of the patient's history were reviewed and updated as appropriate: allergies, current medications, past family history, past medical history, past social history, past surgical history and problem list.    Review of Systems   Gastrointestinal: Negative for bowel incontinence.   Genitourinary: Negative for bladder incontinence and dysuria.   Musculoskeletal: Positive for back pain.       Objective   Physical Exam   Constitutional: She is oriented to person, place, and time. She appears well-developed and well-nourished. No distress.   HENT:   Head: Normocephalic and atraumatic.   Musculoskeletal:        Left knee: She exhibits decreased range of motion and swelling. Tenderness found.        Lumbar back: She exhibits decreased range of motion, tenderness and pain. She exhibits no bony tenderness, no swelling, no edema, no deformity, no laceration and no spasm.       Vascular Status -  Her exam exhibits no right foot edema. Her exam exhibits no left foot edema.  Neurological: She is alert and oriented to person, place, and time.   Reflex Scores:       Patellar reflexes are 2+ on the right side and 2+ on the left side.  Skin: Skin is warm and dry. She is not  diaphoretic.   Psychiatric: She has a normal mood and affect. Her behavior is normal. Judgment and thought content normal.   Nursing note and vitals reviewed.      Assessment/Plan   Problems Addressed this Visit        Nervous and Auditory    Chronic midline low back pain without sciatica       Musculoskeletal and Integument    Chronic pain of left knee - Primary

## 2017-11-24 ENCOUNTER — HOSPITAL ENCOUNTER (INPATIENT)
Facility: HOSPITAL | Age: 82
LOS: 1 days | Discharge: HOME HEALTH | End: 2017-11-28
Attending: EMERGENCY MEDICINE | Admitting: INTERNAL MEDICINE

## 2017-11-24 ENCOUNTER — APPOINTMENT (OUTPATIENT)
Dept: GENERAL RADIOLOGY | Facility: HOSPITAL | Age: 82
End: 2017-11-24

## 2017-11-24 DIAGNOSIS — S22.41XA CLOSED FRACTURE OF MULTIPLE RIBS OF RIGHT SIDE, INITIAL ENCOUNTER: ICD-10-CM

## 2017-11-24 DIAGNOSIS — R77.8 ELEVATED TROPONIN: ICD-10-CM

## 2017-11-24 DIAGNOSIS — Z78.9 IMPAIRED MOBILITY AND ADLS: ICD-10-CM

## 2017-11-24 DIAGNOSIS — S42.031A CLOSED DISPLACED FRACTURE OF ACROMIAL END OF RIGHT CLAVICLE, INITIAL ENCOUNTER: ICD-10-CM
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
## 2017-11-24 DIAGNOSIS — Z74.09 IMPAIRED MOBILITY AND ADLS: ICD-10-CM

## 2017-11-24 DIAGNOSIS — Z78.9 DECREASED ACTIVITIES OF DAILY LIVING (ADL): ICD-10-CM

## 2017-11-24 DIAGNOSIS — Z74.09 IMPAIRED FUNCTIONAL MOBILITY, BALANCE, GAIT, AND ENDURANCE: ICD-10-CM

## 2017-11-24 PROBLEM — E03.9 HYPOTHYROIDISM: Chronic | Status: ACTIVE | Noted: 2017-11-24

## 2017-11-24 PROBLEM — I10 HYPERTENSION: Status: ACTIVE | Noted: 2017-11-24

## 2017-11-24 PROBLEM — I10 HYPERTENSION: Chronic | Status: ACTIVE | Noted: 2017-11-24

## 2017-11-24 LAB
ALBUMIN SERPL-MCNC: 3.9 G/DL (ref 3.4–4.8)
ALBUMIN/GLOB SERPL: 1.1 G/DL (ref 1.1–1.8)
ALP SERPL-CCNC: 78 U/L (ref 38–126)
ALT SERPL W P-5'-P-CCNC: 29 U/L (ref 9–52)
ANION GAP SERPL CALCULATED.3IONS-SCNC: 10 MMOL/L (ref 5–15)
AST SERPL-CCNC: 43 U/L (ref 14–36)
BASOPHILS # BLD AUTO: 0.02 10*3/MM3 (ref 0–0.2)
BASOPHILS NFR BLD AUTO: 0.2 % (ref 0–2)
BILIRUB SERPL-MCNC: 0.6 MG/DL (ref 0.2–1.3)
BUN BLD-MCNC: 23 MG/DL (ref 7–21)
BUN/CREAT SERPL: 25.3 (ref 7–25)
CALCIUM SPEC-SCNC: 9.2 MG/DL (ref 8.4–10.2)
CHLORIDE SERPL-SCNC: 97 MMOL/L (ref 95–110)
CO2 SERPL-SCNC: 29 MMOL/L (ref 22–31)
CREAT BLD-MCNC: 0.91 MG/DL (ref 0.5–1)
DEPRECATED RDW RBC AUTO: 49 FL (ref 36.4–46.3)
EOSINOPHIL # BLD AUTO: 0.14 10*3/MM3 (ref 0–0.7)
EOSINOPHIL NFR BLD AUTO: 1.4 % (ref 0–7)
ERYTHROCYTE [DISTWIDTH] IN BLOOD BY AUTOMATED COUNT: 14.5 % (ref 11.5–14.5)
GFR SERPL CREATININE-BSD FRML MDRD: 58 ML/MIN/1.73 (ref 60–90)
GLOBULIN UR ELPH-MCNC: 3.6 GM/DL (ref 2.3–3.5)
GLUCOSE BLD-MCNC: 128 MG/DL (ref 60–100)
HCT VFR BLD AUTO: 38.7 % (ref 35–45)
HGB BLD-MCNC: 13 G/DL (ref 12–15.5)
IMM GRANULOCYTES # BLD: 0.08 10*3/MM3 (ref 0–0.02)
IMM GRANULOCYTES NFR BLD: 0.8 % (ref 0–0.5)
LIPASE SERPL-CCNC: 67 U/L (ref 23–300)
LYMPHOCYTES # BLD AUTO: 1.28 10*3/MM3 (ref 0.6–4.2)
LYMPHOCYTES NFR BLD AUTO: 13 % (ref 10–50)
MCH RBC QN AUTO: 31 PG (ref 26.5–34)
MCHC RBC AUTO-ENTMCNC: 33.6 G/DL (ref 31.4–36)
MCV RBC AUTO: 92.4 FL (ref 80–98)
MONOCYTES # BLD AUTO: 0.65 10*3/MM3 (ref 0–0.9)
MONOCYTES NFR BLD AUTO: 6.6 % (ref 0–12)
NEUTROPHILS # BLD AUTO: 7.64 10*3/MM3 (ref 2–8.6)
NEUTROPHILS NFR BLD AUTO: 78 % (ref 37–80)
PLATELET # BLD AUTO: 166 10*3/MM3 (ref 150–450)
PMV BLD AUTO: 10.3 FL (ref 8–12)
POTASSIUM BLD-SCNC: 4.5 MMOL/L (ref 3.5–5.1)
PROT SERPL-MCNC: 7.5 G/DL (ref 6.3–8.6)
RBC # BLD AUTO: 4.19 10*6/MM3 (ref 3.77–5.16)
SODIUM BLD-SCNC: 136 MMOL/L (ref 137–145)
TROPONIN I SERPL-MCNC: 0.08 NG/ML
WBC NRBC COR # BLD: 9.81 10*3/MM3 (ref 3.2–9.8)

## 2017-11-24 PROCEDURE — 85025 COMPLETE CBC W/AUTO DIFF WBC: CPT | Performed by: EMERGENCY MEDICINE

## 2017-11-24 PROCEDURE — 99218 PR INITIAL OBSERVATION CARE/DAY 30 MINUTES: CPT | Performed by: SURGERY

## 2017-11-24 PROCEDURE — 73502 X-RAY EXAM HIP UNI 2-3 VIEWS: CPT

## 2017-11-24 PROCEDURE — 90471 IMMUNIZATION ADMIN: CPT | Performed by: EMERGENCY MEDICINE

## 2017-11-24 PROCEDURE — 90715 TDAP VACCINE 7 YRS/> IM: CPT | Performed by: EMERGENCY MEDICINE

## 2017-11-24 PROCEDURE — G0378 HOSPITAL OBSERVATION PER HR: HCPCS

## 2017-11-24 PROCEDURE — 73564 X-RAY EXAM KNEE 4 OR MORE: CPT

## 2017-11-24 PROCEDURE — 25010000002 TDAP 5-2.5-18.5 LF-MCG/0.5 SUSPENSION: Performed by: EMERGENCY MEDICINE

## 2017-11-24 PROCEDURE — 93005 ELECTROCARDIOGRAM TRACING: CPT | Performed by: EMERGENCY MEDICINE

## 2017-11-24 PROCEDURE — 94762 N-INVAS EAR/PLS OXIMTRY CONT: CPT

## 2017-11-24 PROCEDURE — 73030 X-RAY EXAM OF SHOULDER: CPT

## 2017-11-24 PROCEDURE — 84484 ASSAY OF TROPONIN QUANT: CPT | Performed by: EMERGENCY MEDICINE

## 2017-11-24 PROCEDURE — 99285 EMERGENCY DEPT VISIT HI MDM: CPT

## 2017-11-24 PROCEDURE — 83690 ASSAY OF LIPASE: CPT | Performed by: EMERGENCY MEDICINE

## 2017-11-24 PROCEDURE — 71101 X-RAY EXAM UNILAT RIBS/CHEST: CPT

## 2017-11-24 PROCEDURE — 80053 COMPREHEN METABOLIC PANEL: CPT | Performed by: EMERGENCY MEDICINE

## 2017-11-24 PROCEDURE — 93010 ELECTROCARDIOGRAM REPORT: CPT | Performed by: INTERNAL MEDICINE

## 2017-11-24 RX ORDER — HYDROCODONE BITARTRATE AND ACETAMINOPHEN 5; 325 MG/1; MG/1
1 TABLET ORAL EVERY 4 HOURS PRN
Status: DISCONTINUED | OUTPATIENT
Start: 2017-11-24 | End: 2017-11-28 | Stop reason: HOSPADM

## 2017-11-24 RX ORDER — FUROSEMIDE 20 MG/1
20 TABLET ORAL 2 TIMES DAILY
Status: DISCONTINUED | OUTPATIENT
Start: 2017-11-24 | End: 2017-11-26

## 2017-11-24 RX ORDER — CLONIDINE HYDROCHLORIDE 0.1 MG/1
0.1 TABLET ORAL ONCE
Status: COMPLETED | OUTPATIENT
Start: 2017-11-24 | End: 2017-11-24

## 2017-11-24 RX ORDER — ROPINIROLE 0.5 MG/1
0.5 TABLET, FILM COATED ORAL NIGHTLY
Status: DISCONTINUED | OUTPATIENT
Start: 2017-11-24 | End: 2017-11-28 | Stop reason: HOSPADM

## 2017-11-24 RX ORDER — ASPIRIN 325 MG
325 TABLET ORAL ONCE
Status: COMPLETED | OUTPATIENT
Start: 2017-11-24 | End: 2017-11-24

## 2017-11-24 RX ORDER — LATANOPROST 50 UG/ML
1 SOLUTION/ DROPS OPHTHALMIC NIGHTLY
Status: DISCONTINUED | OUTPATIENT
Start: 2017-11-24 | End: 2017-11-28 | Stop reason: HOSPADM

## 2017-11-24 RX ORDER — LEVOTHYROXINE SODIUM 112 UG/1
112 TABLET ORAL
Status: DISCONTINUED | OUTPATIENT
Start: 2017-11-25 | End: 2017-11-28 | Stop reason: HOSPADM

## 2017-11-24 RX ORDER — DOCUSATE SODIUM 100 MG/1
100 CAPSULE, LIQUID FILLED ORAL 2 TIMES DAILY
Status: DISCONTINUED | OUTPATIENT
Start: 2017-11-24 | End: 2017-11-28 | Stop reason: HOSPADM

## 2017-11-24 RX ORDER — CARVEDILOL 6.25 MG/1
6.25 TABLET ORAL 2 TIMES DAILY WITH MEALS
Status: DISCONTINUED | OUTPATIENT
Start: 2017-11-24 | End: 2017-11-28 | Stop reason: HOSPADM

## 2017-11-24 RX ORDER — ASPIRIN 81 MG/1
81 TABLET ORAL DAILY
Status: DISCONTINUED | OUTPATIENT
Start: 2017-11-25 | End: 2017-11-28 | Stop reason: HOSPADM

## 2017-11-24 RX ORDER — SODIUM CHLORIDE 0.9 % (FLUSH) 0.9 %
1-10 SYRINGE (ML) INJECTION AS NEEDED
Status: DISCONTINUED | OUTPATIENT
Start: 2017-11-24 | End: 2017-11-28 | Stop reason: HOSPADM

## 2017-11-24 RX ORDER — HYDROCODONE BITARTRATE AND ACETAMINOPHEN 7.5; 325 MG/1; MG/1
1 TABLET ORAL ONCE
Status: COMPLETED | OUTPATIENT
Start: 2017-11-24 | End: 2017-11-24

## 2017-11-24 RX ORDER — LISINOPRIL 10 MG/1
10 TABLET ORAL EVERY 12 HOURS SCHEDULED
Status: DISCONTINUED | OUTPATIENT
Start: 2017-11-24 | End: 2017-11-28 | Stop reason: HOSPADM

## 2017-11-24 RX ADMIN — ROPINIROLE HYDROCHLORIDE 0.5 MG: 0.5 TABLET, FILM COATED ORAL at 22:40

## 2017-11-24 RX ADMIN — CLONIDINE HYDROCHLORIDE 0.1 MG: 0.1 TABLET ORAL at 17:48

## 2017-11-24 RX ADMIN — LISINOPRIL 10 MG: 10 TABLET ORAL at 22:40

## 2017-11-24 RX ADMIN — FUROSEMIDE 20 MG: 20 TABLET ORAL at 22:39

## 2017-11-24 RX ADMIN — CARVEDILOL 6.25 MG: 6.25 TABLET, FILM COATED ORAL at 22:41

## 2017-11-24 RX ADMIN — TETANUS TOXOID, REDUCED DIPHTHERIA TOXOID AND ACELLULAR PERTUSSIS VACCINE, ADSORBED 0.5 ML: 5; 2.5; 8; 8; 2.5 SUSPENSION INTRAMUSCULAR at 17:50

## 2017-11-24 RX ADMIN — ASPIRIN 325 MG: 325 TABLET, COATED ORAL at 21:04

## 2017-11-24 RX ADMIN — LATANOPROST 1 DROP: 50 SOLUTION OPHTHALMIC at 22:42

## 2017-11-24 RX ADMIN — HYDROCODONE BITARTRATE AND ACETAMINOPHEN 1 TABLET: 5; 325 TABLET ORAL at 22:15

## 2017-11-24 RX ADMIN — HYDROCODONE BITARTRATE AND ACETAMINOPHEN 1 TABLET: 7.5; 325 TABLET ORAL at 17:48

## 2017-11-24 RX ADMIN — DOCUSATE SODIUM 100 MG: 100 CAPSULE, LIQUID FILLED ORAL at 22:41

## 2017-11-25 ENCOUNTER — APPOINTMENT (OUTPATIENT)
Dept: CARDIOLOGY | Facility: HOSPITAL | Age: 82
End: 2017-11-25
Attending: FAMILY MEDICINE

## 2017-11-25 LAB
BH CV ECHO MEAS - ACS: 1.7 CM
BH CV ECHO MEAS - AO MAX PG (FULL): 3.5 MMHG
BH CV ECHO MEAS - AO MAX PG: 8 MMHG
BH CV ECHO MEAS - AO MEAN PG (FULL): 1.3 MMHG
BH CV ECHO MEAS - AO MEAN PG: 4.1 MMHG
BH CV ECHO MEAS - AO ROOT AREA: 5.4 CM^2
BH CV ECHO MEAS - AO ROOT DIAM: 2.6 CM
BH CV ECHO MEAS - AO V2 MAX: 141.8 CM/SEC
BH CV ECHO MEAS - AO V2 MEAN: 90.9 CM/SEC
BH CV ECHO MEAS - AO V2 VTI: 28.8 CM
BH CV ECHO MEAS - AVA(I,A): 2.7 CM^2
BH CV ECHO MEAS - AVA(I,D): 2.7 CM^2
BH CV ECHO MEAS - AVA(V,A): 2.3 CM^2
BH CV ECHO MEAS - AVA(V,D): 2.3 CM^2
BH CV ECHO MEAS - EDV(CUBED): 105.1 ML
BH CV ECHO MEAS - EDV(TEICH): 103.3 ML
BH CV ECHO MEAS - EF(CUBED): 84.7 %
BH CV ECHO MEAS - EF(MOD-SP4): 65 %
BH CV ECHO MEAS - EF(TEICH): 77.9 %
BH CV ECHO MEAS - ESV(CUBED): 16.1 ML
BH CV ECHO MEAS - ESV(TEICH): 22.9 ML
BH CV ECHO MEAS - FS: 46.5 %
BH CV ECHO MEAS - IVS/LVPW: 1
BH CV ECHO MEAS - IVSD: 0.99 CM
BH CV ECHO MEAS - LA DIMENSION: 3.5 CM
BH CV ECHO MEAS - LA/AO: 1.3
BH CV ECHO MEAS - LV MASS(C)D: 159.5 GRAMS
BH CV ECHO MEAS - LV MAX PG: 4.5 MMHG
BH CV ECHO MEAS - LV MEAN PG: 2.7 MMHG
BH CV ECHO MEAS - LV V1 MAX: 106.1 CM/SEC
BH CV ECHO MEAS - LV V1 MEAN: 79.1 CM/SEC
BH CV ECHO MEAS - LV V1 VTI: 25.3 CM
BH CV ECHO MEAS - LVIDD: 4.7 CM
BH CV ECHO MEAS - LVIDS: 2.5 CM
BH CV ECHO MEAS - LVOT AREA (M): 3.1 CM^2
BH CV ECHO MEAS - LVOT AREA: 3 CM^2
BH CV ECHO MEAS - LVOT DIAM: 2 CM
BH CV ECHO MEAS - LVPWD: 0.95 CM
BH CV ECHO MEAS - MR MAX PG: 93 MMHG
BH CV ECHO MEAS - MR MAX VEL: 482.1 CM/SEC
BH CV ECHO MEAS - MV A MAX VEL: 79.9 CM/SEC
BH CV ECHO MEAS - MV DEC SLOPE: 936.8 CM/SEC^2
BH CV ECHO MEAS - MV E MAX VEL: 138.4 CM/SEC
BH CV ECHO MEAS - MV E/A: 1.7
BH CV ECHO MEAS - MV P1/2T MAX VEL: 163.6 CM/SEC
BH CV ECHO MEAS - MV P1/2T: 51.1 MSEC
BH CV ECHO MEAS - MVA P1/2T LCG: 1.3 CM^2
BH CV ECHO MEAS - MVA(P1/2T): 4.3 CM^2
BH CV ECHO MEAS - PA MAX PG: 3.4 MMHG
BH CV ECHO MEAS - PA V2 MAX: 91.9 CM/SEC
BH CV ECHO MEAS - RAP SYSTOLE: 10 MMHG
BH CV ECHO MEAS - RVSP: 80.9 MMHG
BH CV ECHO MEAS - SV(AO): 154.4 ML
BH CV ECHO MEAS - SV(CUBED): 89 ML
BH CV ECHO MEAS - SV(LVOT): 77.3 ML
BH CV ECHO MEAS - SV(TEICH): 80.5 ML
BH CV ECHO MEAS - TR MAX VEL: 421 CM/SEC
LV EF 2D ECHO EST: 60 %
TROPONIN I SERPL-MCNC: 0.08 NG/ML

## 2017-11-25 PROCEDURE — G8987 SELF CARE CURRENT STATUS: HCPCS

## 2017-11-25 PROCEDURE — G0378 HOSPITAL OBSERVATION PER HR: HCPCS

## 2017-11-25 PROCEDURE — 84484 ASSAY OF TROPONIN QUANT: CPT | Performed by: INTERNAL MEDICINE

## 2017-11-25 PROCEDURE — 97530 THERAPEUTIC ACTIVITIES: CPT

## 2017-11-25 PROCEDURE — 93005 ELECTROCARDIOGRAM TRACING: CPT | Performed by: INTERNAL MEDICINE

## 2017-11-25 PROCEDURE — 97162 PT EVAL MOD COMPLEX 30 MIN: CPT

## 2017-11-25 PROCEDURE — G8978 MOBILITY CURRENT STATUS: HCPCS

## 2017-11-25 PROCEDURE — 93010 ELECTROCARDIOGRAM REPORT: CPT | Performed by: INTERNAL MEDICINE

## 2017-11-25 PROCEDURE — G8979 MOBILITY GOAL STATUS: HCPCS

## 2017-11-25 PROCEDURE — G8988 SELF CARE GOAL STATUS: HCPCS

## 2017-11-25 PROCEDURE — 93306 TTE W/DOPPLER COMPLETE: CPT | Performed by: INTERNAL MEDICINE

## 2017-11-25 PROCEDURE — 23500 CLTX CLAVICULAR FX W/O MNPJ: CPT | Performed by: ORTHOPAEDIC SURGERY

## 2017-11-25 PROCEDURE — 97166 OT EVAL MOD COMPLEX 45 MIN: CPT

## 2017-11-25 PROCEDURE — 99221 1ST HOSP IP/OBS SF/LOW 40: CPT | Performed by: ORTHOPAEDIC SURGERY

## 2017-11-25 PROCEDURE — 93306 TTE W/DOPPLER COMPLETE: CPT

## 2017-11-25 RX ADMIN — LEVOTHYROXINE SODIUM 112 MCG: 112 TABLET ORAL at 05:57

## 2017-11-25 RX ADMIN — LISINOPRIL 10 MG: 10 TABLET ORAL at 20:58

## 2017-11-25 RX ADMIN — HYDROCODONE BITARTRATE AND ACETAMINOPHEN 1 TABLET: 5; 325 TABLET ORAL at 08:10

## 2017-11-25 RX ADMIN — SERTRALINE HYDROCHLORIDE 50 MG: 50 TABLET ORAL at 08:10

## 2017-11-25 RX ADMIN — HYDROCODONE BITARTRATE AND ACETAMINOPHEN 1 TABLET: 5; 325 TABLET ORAL at 03:27

## 2017-11-25 RX ADMIN — HYDROCODONE BITARTRATE AND ACETAMINOPHEN 1 TABLET: 5; 325 TABLET ORAL at 16:06

## 2017-11-25 RX ADMIN — CARVEDILOL 6.25 MG: 6.25 TABLET, FILM COATED ORAL at 08:10

## 2017-11-25 RX ADMIN — DOCUSATE SODIUM 100 MG: 100 CAPSULE, LIQUID FILLED ORAL at 17:07

## 2017-11-25 RX ADMIN — HYDROCODONE BITARTRATE AND ACETAMINOPHEN 1 TABLET: 5; 325 TABLET ORAL at 12:00

## 2017-11-25 RX ADMIN — CARVEDILOL 6.25 MG: 6.25 TABLET, FILM COATED ORAL at 17:07

## 2017-11-25 RX ADMIN — ASPIRIN 81 MG: 81 TABLET, COATED ORAL at 08:10

## 2017-11-25 RX ADMIN — HYDROCODONE BITARTRATE AND ACETAMINOPHEN 1 TABLET: 5; 325 TABLET ORAL at 21:12

## 2017-11-25 RX ADMIN — ROPINIROLE HYDROCHLORIDE 0.5 MG: 0.5 TABLET, FILM COATED ORAL at 20:58

## 2017-11-25 RX ADMIN — LISINOPRIL 10 MG: 10 TABLET ORAL at 08:09

## 2017-11-25 RX ADMIN — FUROSEMIDE 20 MG: 20 TABLET ORAL at 08:10

## 2017-11-25 RX ADMIN — DOCUSATE SODIUM 100 MG: 100 CAPSULE, LIQUID FILLED ORAL at 08:10

## 2017-11-25 RX ADMIN — LATANOPROST 1 DROP: 50 SOLUTION OPHTHALMIC at 21:07

## 2017-11-26 PROCEDURE — 97530 THERAPEUTIC ACTIVITIES: CPT

## 2017-11-26 PROCEDURE — G0378 HOSPITAL OBSERVATION PER HR: HCPCS

## 2017-11-26 PROCEDURE — 97110 THERAPEUTIC EXERCISES: CPT

## 2017-11-26 PROCEDURE — 97116 GAIT TRAINING THERAPY: CPT

## 2017-11-26 PROCEDURE — 97535 SELF CARE MNGMENT TRAINING: CPT

## 2017-11-26 RX ORDER — FUROSEMIDE 20 MG/1
20 TABLET ORAL ONCE
Status: COMPLETED | OUTPATIENT
Start: 2017-11-26 | End: 2017-11-26

## 2017-11-26 RX ORDER — FUROSEMIDE 40 MG/1
40 TABLET ORAL DAILY
Status: DISCONTINUED | OUTPATIENT
Start: 2017-11-27 | End: 2017-11-28 | Stop reason: HOSPADM

## 2017-11-26 RX ADMIN — DOCUSATE SODIUM 100 MG: 100 CAPSULE, LIQUID FILLED ORAL at 08:37

## 2017-11-26 RX ADMIN — ROPINIROLE HYDROCHLORIDE 0.5 MG: 0.5 TABLET, FILM COATED ORAL at 20:06

## 2017-11-26 RX ADMIN — FUROSEMIDE 20 MG: 20 TABLET ORAL at 12:03

## 2017-11-26 RX ADMIN — LEVOTHYROXINE SODIUM 112 MCG: 112 TABLET ORAL at 05:53

## 2017-11-26 RX ADMIN — DOCUSATE SODIUM 100 MG: 100 CAPSULE, LIQUID FILLED ORAL at 17:31

## 2017-11-26 RX ADMIN — HYDROCODONE BITARTRATE AND ACETAMINOPHEN 1 TABLET: 5; 325 TABLET ORAL at 10:17

## 2017-11-26 RX ADMIN — CARVEDILOL 6.25 MG: 6.25 TABLET, FILM COATED ORAL at 17:31

## 2017-11-26 RX ADMIN — HYDROCODONE BITARTRATE AND ACETAMINOPHEN 1 TABLET: 5; 325 TABLET ORAL at 06:20

## 2017-11-26 RX ADMIN — CARVEDILOL 6.25 MG: 6.25 TABLET, FILM COATED ORAL at 08:38

## 2017-11-26 RX ADMIN — ASPIRIN 81 MG: 81 TABLET, COATED ORAL at 08:37

## 2017-11-26 RX ADMIN — HYDROCODONE BITARTRATE AND ACETAMINOPHEN 1 TABLET: 5; 325 TABLET ORAL at 14:19

## 2017-11-26 RX ADMIN — HYDROCODONE BITARTRATE AND ACETAMINOPHEN 1 TABLET: 5; 325 TABLET ORAL at 20:11

## 2017-11-26 RX ADMIN — LISINOPRIL 10 MG: 10 TABLET ORAL at 20:06

## 2017-11-26 RX ADMIN — FUROSEMIDE 20 MG: 20 TABLET ORAL at 08:37

## 2017-11-26 RX ADMIN — LISINOPRIL 10 MG: 10 TABLET ORAL at 08:38

## 2017-11-26 RX ADMIN — LATANOPROST 1 DROP: 50 SOLUTION OPHTHALMIC at 20:08

## 2017-11-26 RX ADMIN — HYDROCODONE BITARTRATE AND ACETAMINOPHEN 1 TABLET: 5; 325 TABLET ORAL at 02:01

## 2017-11-26 RX ADMIN — SERTRALINE HYDROCHLORIDE 50 MG: 50 TABLET ORAL at 17:31

## 2017-11-27 PROCEDURE — 97530 THERAPEUTIC ACTIVITIES: CPT

## 2017-11-27 PROCEDURE — 25010000002 ONDANSETRON PER 1 MG: Performed by: FAMILY MEDICINE

## 2017-11-27 PROCEDURE — 97755 ASSISTIVE TECHNOLOGY ASSESS: CPT

## 2017-11-27 PROCEDURE — 97110 THERAPEUTIC EXERCISES: CPT

## 2017-11-27 PROCEDURE — 97116 GAIT TRAINING THERAPY: CPT

## 2017-11-27 RX ORDER — ONDANSETRON 2 MG/ML
4 INJECTION INTRAMUSCULAR; INTRAVENOUS EVERY 6 HOURS PRN
Status: DISCONTINUED | OUTPATIENT
Start: 2017-11-27 | End: 2017-11-28 | Stop reason: HOSPADM

## 2017-11-27 RX ADMIN — DOCUSATE SODIUM 100 MG: 100 CAPSULE, LIQUID FILLED ORAL at 17:14

## 2017-11-27 RX ADMIN — HYDROCODONE BITARTRATE AND ACETAMINOPHEN 1 TABLET: 5; 325 TABLET ORAL at 09:09

## 2017-11-27 RX ADMIN — DOCUSATE SODIUM 100 MG: 100 CAPSULE, LIQUID FILLED ORAL at 09:10

## 2017-11-27 RX ADMIN — ASPIRIN 81 MG: 81 TABLET, COATED ORAL at 09:10

## 2017-11-27 RX ADMIN — HYDROCODONE BITARTRATE AND ACETAMINOPHEN 1 TABLET: 5; 325 TABLET ORAL at 04:51

## 2017-11-27 RX ADMIN — FUROSEMIDE 40 MG: 40 TABLET ORAL at 09:10

## 2017-11-27 RX ADMIN — HYDROCODONE BITARTRATE AND ACETAMINOPHEN 1 TABLET: 5; 325 TABLET ORAL at 13:08

## 2017-11-27 RX ADMIN — SERTRALINE HYDROCHLORIDE 50 MG: 50 TABLET ORAL at 09:09

## 2017-11-27 RX ADMIN — LISINOPRIL 10 MG: 10 TABLET ORAL at 09:09

## 2017-11-27 RX ADMIN — LATANOPROST 1 DROP: 50 SOLUTION OPHTHALMIC at 21:41

## 2017-11-27 RX ADMIN — CARVEDILOL 6.25 MG: 6.25 TABLET, FILM COATED ORAL at 09:10

## 2017-11-27 RX ADMIN — CARVEDILOL 6.25 MG: 6.25 TABLET, FILM COATED ORAL at 17:14

## 2017-11-27 RX ADMIN — LEVOTHYROXINE SODIUM 112 MCG: 112 TABLET ORAL at 05:38

## 2017-11-27 RX ADMIN — ROPINIROLE HYDROCHLORIDE 0.5 MG: 0.5 TABLET, FILM COATED ORAL at 21:40

## 2017-11-27 RX ADMIN — ONDANSETRON 4 MG: 2 INJECTION INTRAMUSCULAR; INTRAVENOUS at 13:12

## 2017-11-27 RX ADMIN — HYDROCODONE BITARTRATE AND ACETAMINOPHEN 1 TABLET: 5; 325 TABLET ORAL at 21:40

## 2017-11-27 RX ADMIN — HYDROCODONE BITARTRATE AND ACETAMINOPHEN 1 TABLET: 5; 325 TABLET ORAL at 17:14

## 2017-11-27 RX ADMIN — LISINOPRIL 10 MG: 10 TABLET ORAL at 21:40

## 2017-11-27 RX ADMIN — HYDROCODONE BITARTRATE AND ACETAMINOPHEN 1 TABLET: 5; 325 TABLET ORAL at 00:01

## 2017-11-28 VITALS
HEART RATE: 65 BPM | SYSTOLIC BLOOD PRESSURE: 123 MMHG | BODY MASS INDEX: 17.16 KG/M2 | TEMPERATURE: 98.9 F | DIASTOLIC BLOOD PRESSURE: 73 MMHG | OXYGEN SATURATION: 93 % | HEIGHT: 65 IN | RESPIRATION RATE: 18 BRPM | WEIGHT: 103 LBS

## 2017-11-28 PROCEDURE — 97530 THERAPEUTIC ACTIVITIES: CPT

## 2017-11-28 PROCEDURE — 97116 GAIT TRAINING THERAPY: CPT

## 2017-11-28 PROCEDURE — 97110 THERAPEUTIC EXERCISES: CPT

## 2017-11-28 PROCEDURE — 97535 SELF CARE MNGMENT TRAINING: CPT

## 2017-11-28 RX ADMIN — HYDROCODONE BITARTRATE AND ACETAMINOPHEN 1 TABLET: 5; 325 TABLET ORAL at 09:33

## 2017-11-28 RX ADMIN — FUROSEMIDE 40 MG: 40 TABLET ORAL at 09:33

## 2017-11-28 RX ADMIN — LEVOTHYROXINE SODIUM 112 MCG: 112 TABLET ORAL at 06:18

## 2017-11-28 RX ADMIN — DOCUSATE SODIUM 100 MG: 100 CAPSULE, LIQUID FILLED ORAL at 09:33

## 2017-11-28 RX ADMIN — SERTRALINE HYDROCHLORIDE 50 MG: 50 TABLET ORAL at 09:34

## 2017-11-28 RX ADMIN — CARVEDILOL 6.25 MG: 6.25 TABLET, FILM COATED ORAL at 09:33

## 2017-11-28 RX ADMIN — HYDROCODONE BITARTRATE AND ACETAMINOPHEN 1 TABLET: 5; 325 TABLET ORAL at 04:19

## 2017-11-28 RX ADMIN — LISINOPRIL 10 MG: 10 TABLET ORAL at 09:33

## 2017-11-28 RX ADMIN — ASPIRIN 81 MG: 81 TABLET, COATED ORAL at 09:33

## 2017-11-28 RX ADMIN — HYDROCODONE BITARTRATE AND ACETAMINOPHEN 1 TABLET: 5; 325 TABLET ORAL at 13:07

## 2017-11-30 ENCOUNTER — TELEPHONE (OUTPATIENT)
Dept: ORTHOPEDIC SURGERY | Facility: CLINIC | Age: 82
End: 2017-11-30

## 2017-12-05 ENCOUNTER — OFFICE VISIT (OUTPATIENT)
Dept: FAMILY MEDICINE CLINIC | Facility: CLINIC | Age: 82
End: 2017-12-05

## 2017-12-05 ENCOUNTER — TELEPHONE (OUTPATIENT)
Dept: ORTHOPEDIC SURGERY | Facility: CLINIC | Age: 82
End: 2017-12-05

## 2017-12-05 VITALS
HEART RATE: 64 BPM | BODY MASS INDEX: 17.16 KG/M2 | HEIGHT: 65 IN | OXYGEN SATURATION: 98 % | SYSTOLIC BLOOD PRESSURE: 122 MMHG | DIASTOLIC BLOOD PRESSURE: 64 MMHG | WEIGHT: 103 LBS

## 2017-12-05 DIAGNOSIS — S22.41XA CLOSED FRACTURE OF MULTIPLE RIBS OF RIGHT SIDE, INITIAL ENCOUNTER: Primary | ICD-10-CM

## 2017-12-05 DIAGNOSIS — G89.29 CHRONIC MIDLINE LOW BACK PAIN WITHOUT SCIATICA: ICD-10-CM

## 2017-12-05 DIAGNOSIS — M25.562 CHRONIC PAIN OF LEFT KNEE: ICD-10-CM

## 2017-12-05 DIAGNOSIS — M54.50 CHRONIC MIDLINE LOW BACK PAIN WITHOUT SCIATICA: ICD-10-CM
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
## 2017-12-05 DIAGNOSIS — G89.29 CHRONIC PAIN OF LEFT KNEE: ICD-10-CM

## 2017-12-05 PROCEDURE — 99214 OFFICE O/P EST MOD 30 MIN: CPT | Performed by: FAMILY MEDICINE

## 2017-12-05 RX ORDER — HYDROCODONE BITARTRATE AND ACETAMINOPHEN 5; 325 MG/1; MG/1
1 TABLET ORAL EVERY 8 HOURS PRN
Qty: 90 TABLET | Refills: 0 | Status: SHIPPED | OUTPATIENT
Start: 2017-12-05 | End: 2018-01-01 | Stop reason: SDUPTHER

## 2017-12-05 NOTE — TELEPHONE ENCOUNTER
Can she use a regular walker. Was given an richy walker but she is falling a lot with it. [phone]

## 2017-12-05 NOTE — PROGRESS NOTES
Subjective   Tiffanie Arroyo is a 89 y.o. female.     History of Present Illness     {Common H&P Review Areas:33898}    Review of Systems    Objective   Physical Exam    Assessment/Plan   {Assess/PlanSmartLinks:63078}          Current outpatient and discharge medications have been reconciled for the patient.  Abilio Bhardwaj MD

## 2017-12-14 ENCOUNTER — OFFICE VISIT (OUTPATIENT)
Dept: CARDIOLOGY | Facility: CLINIC | Age: 82
End: 2017-12-14

## 2017-12-14 VITALS
DIASTOLIC BLOOD PRESSURE: 48 MMHG | HEART RATE: 58 BPM | BODY MASS INDEX: 17.66 KG/M2 | SYSTOLIC BLOOD PRESSURE: 100 MMHG | HEIGHT: 65 IN | WEIGHT: 106 LBS

## 2017-12-14 DIAGNOSIS — Z95.1 S/P CABG (CORONARY ARTERY BYPASS GRAFT): ICD-10-CM

## 2017-12-14 DIAGNOSIS — I10 ESSENTIAL HYPERTENSION: Chronic | ICD-10-CM

## 2017-12-14 DIAGNOSIS — E78.2 MIXED HYPERLIPIDEMIA: ICD-10-CM

## 2017-12-14 DIAGNOSIS — I48.92 ATRIAL FLUTTER, UNSPECIFIED TYPE (HCC): Primary | ICD-10-CM

## 2017-12-14 PROCEDURE — 99213 OFFICE O/P EST LOW 20 MIN: CPT | Performed by: INTERNAL MEDICINE

## 2017-12-14 NOTE — PROGRESS NOTES
Tiffanie Arroyo  89 y.o. female    12/14/2017  1. Atrial flutter, unspecified type    2. Essential hypertension    3. Mixed hyperlipidemia    4. S/P CABG (coronary artery bypass graft)        History of Present Illness    Mrs. Arroyo is here for follow-up of her above stated problems.  She is an elderly lady who was recently admitted to [facility] following a fall which resulted in fractures of the ribs and shoulder fortunately she has done reasonably well except for pain.  She had an echo cardiac gram performed during her admission and this showed the following findings:    · Left ventricular wall thickness is consistent with moderate-to-severe concentric hypertrophy.  · Left ventricular systolic function is normal. Estimated EF = 60%.  · Left ventricular diastolic dysfunction (grade I) consistent with impaired relaxation.  · Left atrial cavity size is mildly dilated.  · Mild aortic valve regurgitation is present.  · Moderate mitral valve regurgitation is present  · Moderate to severe tricuspid valve regurgitation is present.  · Mild pulmonic valve regurgitation is present    She does have severe pulmonary hypertension.  He denied any cardiac symptoms with no chest pain or shortness of breath.  Her ambulation is restricted secondary to severe DJD.    SUBJECTIVE    Allergies   Allergen Reactions   • Codeine Nausea Only and GI Intolerance   • Penicillins Hives   • Sulfa Antibiotics Other (See Comments)     Occurred a long time ago - pt cannot remember reaction.         Past Medical History:   Diagnosis Date   • Acute congestive heart failure    • Atrial flutter    • Coronary arteriosclerosis    • Degenerative joint disease involving multiple joints    • Depressive disorder    • Generalized anxiety disorder    • Glaucoma    • Nonrheumatic mitral valve insufficiency    • Nonrheumatic tricuspid valve disorder    • Osteoporosis    • Repeated falls    • Subdural hematoma          Past Surgical History:   Procedure  Laterality Date   • CORONARY ARTERY BYPASS GRAFT     • EXPLORATORY LAPAROTOMY     • HIP ARTHROPLASTY Left    • NECK LESION BIOPSY     • THYROIDECTOMY           Family History   Problem Relation Age of Onset   • Hypertension Father          Social History     Social History   • Marital status:      Spouse name: N/A   • Number of children: N/A   • Years of education: N/A     Occupational History   • Not on file.     Social History Main Topics   • Smoking status: Never Smoker   • Smokeless tobacco: Never Used   • Alcohol use No   • Drug use: No   • Sexual activity: Not Currently     Other Topics Concern   • Not on file     Social History Narrative         Current Outpatient Prescriptions   Medication Sig Dispense Refill   • ALPHAGAN P 0.1 % solution ophthalmic solution Administer 1 drop to both eyes 3 (Three) Times a Day. 5 mL 5   • aspirin 81 MG EC tablet Take 1 tablet by mouth daily. (Patient taking differently: Take 81 mg by mouth Every Night.) 30 tablet 11   • carvedilol (COREG) 6.25 MG tablet Take 1 tablet by mouth 2 (Two) Times a Day With Meals. 60 tablet 11   • docusate sodium (DOK) 100 MG capsule Take 1 capsule by mouth 2 (Two) Times a Day. 60 capsule 5   • furosemide (LASIX) 20 MG tablet TAKE 2 TABLETS EACH MORNING. 60 tablet 10   • HYDROcodone-acetaminophen (NORCO) 5-325 MG per tablet Take 1 tablet by mouth Every 8 (Eight) Hours As Needed (back pain). 90 tablet 0   • latanoprost (XALATAN) 0.005 % ophthalmic solution Administer 1 drop to both eyes Every Night. 2.5 mL 1   • levothyroxine (SYNTHROID, LEVOTHROID) 112 MCG tablet TAKE 1 TAB DAILY FOR THYROID, ON EMPTY STOMACH -- EITHER 1 HOUR BEFORE EATING OR 2 HOURS AFTER 30 tablet 7   • rOPINIRole (REQUIP) 0.5 MG tablet Take 1 tablet by mouth Every Night. 90 tablet 3   • sertraline (ZOLOFT) 50 MG tablet Take 1 tablet by mouth Daily. (Patient taking differently: Take 50 mg by mouth Every Night.) 30 tablet 11   • lisinopril (PRINIVIL,ZESTRIL) 10 MG tablet  "Take 1 tablet by mouth Daily. 90 tablet 2     No current facility-administered medications for this visit.          OBJECTIVE    /48  Pulse 58  Ht 165.1 cm (65\")  Wt 48.1 kg (106 lb)  BMI 17.64 kg/m2        Review of Systems     Constitutional:  Denies recent weight loss, weight gain, fever or chills, no change in exercise tolerance     HENT:  Denies any hearing loss, epistaxis, hoarseness, or difficulty speaking.     Eyes: Wears eyeglasses or contact lenses     Respiratory:  Denies dyspnea with exertion,no cough, wheezing, or hemoptysis.     Cardiovascular: Negative for palpations, chest pain, orthopnea, PND, peripheral edema, syncope, or claudication.     Gastrointestinal:  Denies change in bowel habits, dyspepsia, ulcer disease, hematochezia, or melena.     Endocrine: Negative for cold intolerance, heat intolerance, polydipsia, polyphagia and polyuria. Denies any history of weight change, heat/cold intolerance, polydipsia, polyuria     Genitourinary: Negative.      Musculoskeletal: DJD, falls, recent fractures        Physical Exam     Constitutional: Cooperative, alert and oriented,in no acute distress.     HENT:   Head: Normocephalic, normal hair patterns, no masses or tenderness.  Ears, Nose, and Throat: No gross abnormalities. No pallor or cyanosis.   Eyes: EOMS intact, PERRL, conjunctivae and lids unremarkable. Fundoscopic exam and visual fields not performed.   Neck: No palpable masses or adenopathy, no thyromegaly, no JVD, carotid pulses are full and equal bilaterally and without  Bruits.     Cardiovascular: Regular rhythm, S1 and S2 normal, no S3 or S4. 3/6 systolic murmur, No gallops, or rubs detected.     Pulmonary/Chest: Chest: normal symmetry, no tenderness to palpation, normal respiratory excursion, no use of accessory muscles.            Pulmonary: Normal breath sounds. No rales or ronchi.    Abdominal: Abdomen soft, bowel sounds normoactive, no masses, no hepatosplenomegaly, non-tender, no " bruits.     Musculoskeletal: No deformities, clubbing, cyanosis, erythema, or edema observed.      Procedures      Lab Results   Component Value Date    WBC 9.81 (H) 11/24/2017    HGB 13.0 11/24/2017    HCT 38.7 11/24/2017    MCV 92.4 11/24/2017     11/24/2017     Lab Results   Component Value Date    GLUCOSE 128 (H) 11/24/2017    BUN 23 (H) 11/24/2017    CREATININE 0.91 11/24/2017    EGFRIFNONA 58 (L) 11/24/2017    BCR 25.3 (H) 11/24/2017    CO2 29.0 11/24/2017    CALCIUM 9.2 11/24/2017    ALBUMIN 3.90 11/24/2017    LABIL2 1.1 11/24/2017    AST 43 (H) 11/24/2017    ALT 29 11/24/2017     Lab Results   Component Value Date    CHOL 141 02/20/2017     Lab Results   Component Value Date    TRIG 65 02/20/2017    TRIG 110 02/19/2015    TRIG 128 06/25/2014     Lab Results   Component Value Date    HDL 50 (L) 02/20/2017    HDL 61 02/19/2015    HDL 60 06/25/2014     Lab Results   Component Value Date    LDLCALC 42 02/19/2015    LDLCALC 64 06/25/2014     No results found for: LDL  No results found for: HDLLDLRATIO  No components found for: CHOLHDL  No results found for: HGBA1C  Lab Results   Component Value Date    TSH <0.020 (L) 09/13/2017           ASSESSMENT AND PLAN    Mrs. Arroyo, in spite of her advanced age and multiple cardiac issues is stable from a cardiac standpoint with no chest pain or shortness of breath.  No signs of congestive heart failure was noted.  In view of her advanced age and conservative approach would be reasonable.  I've continued antiplatelet therapy with aspirin, antihypertensive therapy with Coreg, lisinopril and low-dose diuretics have been continued.  Her blood pressure will be monitored closely by the family and I have advised her to cut back on the dose of bisoprolol 5 mg daily if the systolic blood pressure is in the 100- 120 range.    Tiffanie was seen today for follow-up.    Diagnoses and all orders for this visit:    Atrial flutter, unspecified type    Essential hypertension    Mixed  hyperlipidemia    S/P CABG (coronary artery bypass graft)        Jose Chao MD  12/14/2017  5:06 PM

## 2017-12-27 NOTE — PROGRESS NOTES
HEARING AID CHECK    Name:  Tiffanie Arroyo  :  9/10/1926  Age:  89 y.o.  Date of Evaluation:  2017      HISTORY    Reason for visit:  Tiffanie Arroyo is seen today for a hearing aid check.  Patient reports that her right ear hurts whenever she wears her hearing aid.  She also reports that she would like to have her hearing aids cleaned.    Hearing aid history:  Patient is currently wearing a In the Ear (ITE) hearing aid in both ear(s). and Current aid(s) 4 years old.     OFFICE VISIT    During today's visit otoscopy was completed.  It was noted that there was a pressure sore in the inferior cartilaginous portion of the ear canal.  It was suggested that the patient put mineral oil in her ear twice per day for the next few days to alleviate pain.  It was suggested that she not wear her hearing aid for the next 3 days.  It was recommended that she return immediately if she still has pain in her right ear upon insertion of the hearing aid.  Hearing aids were cleaned and checked.  Patient was pleased with the sound quality of the hearing aids.  Patient paid $25 in [employer] for this visit.    It was a pleasure seeing Tiffanie Arroyo in Audiology today.  It is a pleasure helping Ms. Arroyo with her amplification needs.              This document has been electronically signed by BIJAN Ríos on 2017 4:01 PM        BIJAN Ríos  Licensed Audiologist    For Billing and Codin  Hearing Aid Check, Binaural - no charge

## 2018-01-01 ENCOUNTER — TELEPHONE (OUTPATIENT)
Dept: FAMILY MEDICINE CLINIC | Facility: CLINIC | Age: 83
End: 2018-01-01

## 2018-01-01 ENCOUNTER — EPISODE CHANGES (OUTPATIENT)
Dept: CASE MANAGEMENT | Facility: OTHER | Age: 83
End: 2018-01-01

## 2018-01-01 ENCOUNTER — APPOINTMENT (OUTPATIENT)
Dept: INTERVENTIONAL RADIOLOGY/VASCULAR | Facility: HOSPITAL | Age: 83
End: 2018-01-01

## 2018-01-01 ENCOUNTER — APPOINTMENT (OUTPATIENT)
Dept: ONCOLOGY | Facility: HOSPITAL | Age: 83
End: 2018-01-01

## 2018-01-01 ENCOUNTER — OFFICE VISIT (OUTPATIENT)
Dept: FAMILY MEDICINE CLINIC | Facility: CLINIC | Age: 83
End: 2018-01-01

## 2018-01-01 ENCOUNTER — APPOINTMENT (OUTPATIENT)
Dept: GENERAL RADIOLOGY | Facility: HOSPITAL | Age: 83
End: 2018-01-01

## 2018-01-01 ENCOUNTER — LAB (OUTPATIENT)
Dept: LAB | Facility: HOSPITAL | Age: 83
End: 2018-01-01

## 2018-01-01 ENCOUNTER — READMISSION MANAGEMENT (OUTPATIENT)
Dept: CALL CENTER | Facility: HOSPITAL | Age: 83
End: 2018-01-01

## 2018-01-01 ENCOUNTER — OFFICE VISIT (OUTPATIENT)
Dept: WOUND CARE | Facility: HOSPITAL | Age: 83
End: 2018-01-01

## 2018-01-01 ENCOUNTER — APPOINTMENT (OUTPATIENT)
Dept: ULTRASOUND IMAGING | Facility: HOSPITAL | Age: 83
End: 2018-01-01

## 2018-01-01 ENCOUNTER — TRANSCRIBE ORDERS (OUTPATIENT)
Dept: ADMINISTRATIVE | Facility: HOSPITAL | Age: 83
End: 2018-01-01

## 2018-01-01 ENCOUNTER — APPOINTMENT (OUTPATIENT)
Dept: LAB | Facility: HOSPITAL | Age: 83
End: 2018-01-01

## 2018-01-01 ENCOUNTER — OUTSIDE FACILITY SERVICE (OUTPATIENT)
Dept: FAMILY MEDICINE CLINIC | Facility: CLINIC | Age: 83
End: 2018-01-01

## 2018-01-01 ENCOUNTER — HOSPITAL ENCOUNTER (EMERGENCY)
Facility: HOSPITAL | Age: 83
Discharge: HOME | End: 2018-07-14
Attending: EMERGENCY MEDICINE | Admitting: EMERGENCY MEDICINE

## 2018-01-01 ENCOUNTER — HOSPITAL ENCOUNTER (OUTPATIENT)
Facility: HOSPITAL | Age: 83
Setting detail: OBSERVATION
LOS: 1 days | Discharge: HOME HEALTH | End: 2018-11-30
Attending: FAMILY MEDICINE | Admitting: HOSPITALIST

## 2018-01-01 ENCOUNTER — APPOINTMENT (OUTPATIENT)
Dept: WOUND CARE | Facility: HOSPITAL | Age: 83
End: 2018-01-01

## 2018-01-01 ENCOUNTER — PATIENT OUTREACH (OUTPATIENT)
Dept: CASE MANAGEMENT | Facility: OTHER | Age: 83
End: 2018-01-01

## 2018-01-01 ENCOUNTER — HOSPITAL ENCOUNTER (OUTPATIENT)
Facility: HOSPITAL | Age: 83
Setting detail: OBSERVATION
Discharge: HOME HEALTH | End: 2018-11-06
Attending: FAMILY MEDICINE | Admitting: INTERNAL MEDICINE

## 2018-01-01 ENCOUNTER — PRIOR AUTHORIZATION (OUTPATIENT)
Dept: FAMILY MEDICINE CLINIC | Facility: CLINIC | Age: 83
End: 2018-01-01

## 2018-01-01 ENCOUNTER — HOSPITAL ENCOUNTER (INPATIENT)
Facility: HOSPITAL | Age: 83
LOS: 6 days | Discharge: NURSING, REHAB OR LONG-TERM CARE | End: 2018-09-04
Attending: EMERGENCY MEDICINE | Admitting: INTERNAL MEDICINE

## 2018-01-01 ENCOUNTER — INFUSION (OUTPATIENT)
Dept: ONCOLOGY | Facility: HOSPITAL | Age: 83
End: 2018-01-01

## 2018-01-01 ENCOUNTER — OFFICE VISIT (OUTPATIENT)
Dept: CARDIOLOGY | Facility: CLINIC | Age: 83
End: 2018-01-01

## 2018-01-01 ENCOUNTER — LAB REQUISITION (OUTPATIENT)
Dept: LAB | Facility: HOSPITAL | Age: 83
End: 2018-01-01

## 2018-01-01 ENCOUNTER — HOSPITAL ENCOUNTER (EMERGENCY)
Facility: HOSPITAL | Age: 83
Discharge: HOME | End: 2018-10-02
Attending: FAMILY MEDICINE | Admitting: EMERGENCY MEDICINE

## 2018-01-01 VITALS
TEMPERATURE: 98 F | BODY MASS INDEX: 16.6 KG/M2 | HEIGHT: 66 IN | SYSTOLIC BLOOD PRESSURE: 116 MMHG | DIASTOLIC BLOOD PRESSURE: 64 MMHG | RESPIRATION RATE: 20 BRPM | OXYGEN SATURATION: 99 % | WEIGHT: 103.3 LBS | HEART RATE: 81 BPM

## 2018-01-01 VITALS
HEART RATE: 56 BPM | BODY MASS INDEX: 19.09 KG/M2 | WEIGHT: 114.6 LBS | SYSTOLIC BLOOD PRESSURE: 120 MMHG | HEIGHT: 65 IN | DIASTOLIC BLOOD PRESSURE: 64 MMHG | OXYGEN SATURATION: 99 %

## 2018-01-01 VITALS
OXYGEN SATURATION: 96 % | HEART RATE: 70 BPM | HEART RATE: 69 BPM | TEMPERATURE: 96.7 F | WEIGHT: 114.5 LBS | DIASTOLIC BLOOD PRESSURE: 40 MMHG | WEIGHT: 110 LBS | OXYGEN SATURATION: 100 % | BODY MASS INDEX: 18.33 KG/M2 | HEIGHT: 65 IN | SYSTOLIC BLOOD PRESSURE: 100 MMHG | HEIGHT: 65 IN | DIASTOLIC BLOOD PRESSURE: 64 MMHG | RESPIRATION RATE: 18 BRPM | BODY MASS INDEX: 19.08 KG/M2 | SYSTOLIC BLOOD PRESSURE: 136 MMHG

## 2018-01-01 VITALS
WEIGHT: 117 LBS | BODY MASS INDEX: 19.49 KG/M2 | SYSTOLIC BLOOD PRESSURE: 124 MMHG | HEIGHT: 65 IN | DIASTOLIC BLOOD PRESSURE: 68 MMHG

## 2018-01-01 VITALS
BODY MASS INDEX: 18.99 KG/M2 | OXYGEN SATURATION: 97 % | SYSTOLIC BLOOD PRESSURE: 122 MMHG | HEART RATE: 71 BPM | WEIGHT: 114 LBS | DIASTOLIC BLOOD PRESSURE: 70 MMHG | HEIGHT: 65 IN

## 2018-01-01 VITALS
HEIGHT: 65 IN | RESPIRATION RATE: 18 BRPM | WEIGHT: 105.8 LBS | SYSTOLIC BLOOD PRESSURE: 150 MMHG | OXYGEN SATURATION: 95 % | BODY MASS INDEX: 17.63 KG/M2 | TEMPERATURE: 98.1 F | HEART RATE: 68 BPM | DIASTOLIC BLOOD PRESSURE: 70 MMHG

## 2018-01-01 VITALS
DIASTOLIC BLOOD PRESSURE: 70 MMHG | BODY MASS INDEX: 17.16 KG/M2 | OXYGEN SATURATION: 99 % | HEIGHT: 65 IN | HEART RATE: 68 BPM | WEIGHT: 103 LBS | SYSTOLIC BLOOD PRESSURE: 118 MMHG

## 2018-01-01 VITALS
BODY MASS INDEX: 19.16 KG/M2 | WEIGHT: 115 LBS | HEART RATE: 68 BPM | DIASTOLIC BLOOD PRESSURE: 60 MMHG | HEIGHT: 65 IN | SYSTOLIC BLOOD PRESSURE: 110 MMHG | OXYGEN SATURATION: 98 %

## 2018-01-01 VITALS
SYSTOLIC BLOOD PRESSURE: 110 MMHG | OXYGEN SATURATION: 98 % | WEIGHT: 117 LBS | BODY MASS INDEX: 19.49 KG/M2 | HEART RATE: 57 BPM | DIASTOLIC BLOOD PRESSURE: 60 MMHG | HEIGHT: 65 IN

## 2018-01-01 VITALS
BODY MASS INDEX: 17.66 KG/M2 | HEIGHT: 65 IN | WEIGHT: 106 LBS | DIASTOLIC BLOOD PRESSURE: 68 MMHG | OXYGEN SATURATION: 98 % | SYSTOLIC BLOOD PRESSURE: 118 MMHG | HEART RATE: 64 BPM

## 2018-01-01 VITALS
BODY MASS INDEX: 19.11 KG/M2 | WEIGHT: 114.7 LBS | SYSTOLIC BLOOD PRESSURE: 134 MMHG | OXYGEN SATURATION: 95 % | HEART RATE: 56 BPM | HEIGHT: 65 IN | DIASTOLIC BLOOD PRESSURE: 78 MMHG

## 2018-01-01 VITALS
SYSTOLIC BLOOD PRESSURE: 118 MMHG | DIASTOLIC BLOOD PRESSURE: 64 MMHG | HEIGHT: 65 IN | HEART RATE: 80 BPM | WEIGHT: 117 LBS | OXYGEN SATURATION: 98 % | BODY MASS INDEX: 19.49 KG/M2

## 2018-01-01 VITALS
TEMPERATURE: 98.9 F | HEART RATE: 106 BPM | HEIGHT: 65 IN | DIASTOLIC BLOOD PRESSURE: 79 MMHG | SYSTOLIC BLOOD PRESSURE: 169 MMHG | OXYGEN SATURATION: 97 % | RESPIRATION RATE: 18 BRPM | WEIGHT: 103 LBS | BODY MASS INDEX: 17.16 KG/M2

## 2018-01-01 VITALS
WEIGHT: 105 LBS | HEIGHT: 65 IN | OXYGEN SATURATION: 94 % | BODY MASS INDEX: 17.49 KG/M2 | SYSTOLIC BLOOD PRESSURE: 90 MMHG | HEART RATE: 73 BPM | DIASTOLIC BLOOD PRESSURE: 44 MMHG

## 2018-01-01 VITALS
BODY MASS INDEX: 19.16 KG/M2 | WEIGHT: 115 LBS | HEIGHT: 65 IN | SYSTOLIC BLOOD PRESSURE: 128 MMHG | DIASTOLIC BLOOD PRESSURE: 80 MMHG | HEART RATE: 76 BPM

## 2018-01-01 VITALS
SYSTOLIC BLOOD PRESSURE: 100 MMHG | DIASTOLIC BLOOD PRESSURE: 60 MMHG | WEIGHT: 105 LBS | OXYGEN SATURATION: 97 % | HEIGHT: 65 IN | HEART RATE: 51 BPM | BODY MASS INDEX: 17.49 KG/M2

## 2018-01-01 VITALS
SYSTOLIC BLOOD PRESSURE: 154 MMHG | HEIGHT: 65 IN | HEART RATE: 74 BPM | OXYGEN SATURATION: 95 % | RESPIRATION RATE: 18 BRPM | WEIGHT: 108 LBS | TEMPERATURE: 97.9 F | DIASTOLIC BLOOD PRESSURE: 83 MMHG | BODY MASS INDEX: 17.99 KG/M2

## 2018-01-01 DIAGNOSIS — Z00.00 INITIAL MEDICARE ANNUAL WELLNESS VISIT: ICD-10-CM

## 2018-01-01 DIAGNOSIS — G89.29 CHRONIC MIDLINE LOW BACK PAIN WITHOUT SCIATICA: ICD-10-CM

## 2018-01-01 DIAGNOSIS — Z74.09 IMPAIRED FUNCTIONAL MOBILITY, BALANCE, GAIT, AND ENDURANCE: ICD-10-CM

## 2018-01-01 DIAGNOSIS — G89.29 CHRONIC MIDLINE LOW BACK PAIN WITHOUT SCIATICA: Primary | ICD-10-CM

## 2018-01-01 DIAGNOSIS — M15.9 PRIMARY OSTEOARTHRITIS INVOLVING MULTIPLE JOINTS: Chronic | ICD-10-CM

## 2018-01-01 DIAGNOSIS — I34.0 NONRHEUMATIC MITRAL VALVE INSUFFICIENCY: ICD-10-CM

## 2018-01-01 DIAGNOSIS — S81.811D LACERATION OF RIGHT LOWER LEG, SUBSEQUENT ENCOUNTER: Primary | ICD-10-CM

## 2018-01-01 DIAGNOSIS — E86.0 DEHYDRATION: ICD-10-CM

## 2018-01-01 DIAGNOSIS — R79.89 ELEVATED SERUM CREATININE: ICD-10-CM

## 2018-01-01 DIAGNOSIS — I10 ESSENTIAL HYPERTENSION: ICD-10-CM

## 2018-01-01 DIAGNOSIS — R53.81 DEBILITY: ICD-10-CM

## 2018-01-01 DIAGNOSIS — E86.0 DEHYDRATION: Primary | ICD-10-CM

## 2018-01-01 DIAGNOSIS — I48.92 ATRIAL FLUTTER, UNSPECIFIED TYPE (HCC): Primary | ICD-10-CM

## 2018-01-01 DIAGNOSIS — E03.8 HYPOTHYROIDISM DUE TO HASHIMOTO'S THYROIDITIS: Chronic | ICD-10-CM

## 2018-01-01 DIAGNOSIS — F03.90 DEMENTIA WITHOUT BEHAVIORAL DISTURBANCE, UNSPECIFIED DEMENTIA TYPE: ICD-10-CM

## 2018-01-01 DIAGNOSIS — H40.10X0 OPEN-ANGLE GLAUCOMA OF BOTH EYES, UNSPECIFIED GLAUCOMA STAGE, UNSPECIFIED OPEN-ANGLE GLAUCOMA TYPE: ICD-10-CM

## 2018-01-01 DIAGNOSIS — E06.3 HYPOTHYROIDISM DUE TO HASHIMOTO'S THYROIDITIS: Chronic | ICD-10-CM

## 2018-01-01 DIAGNOSIS — M54.50 CHRONIC MIDLINE LOW BACK PAIN WITHOUT SCIATICA: Primary | ICD-10-CM

## 2018-01-01 DIAGNOSIS — M25.562 CHRONIC PAIN OF LEFT KNEE: Primary | ICD-10-CM

## 2018-01-01 DIAGNOSIS — M25.562 CHRONIC PAIN OF LEFT KNEE: ICD-10-CM

## 2018-01-01 DIAGNOSIS — Z74.09 IMPAIRED FUNCTIONAL MOBILITY, BALANCE, GAIT, AND ENDURANCE: Primary | ICD-10-CM

## 2018-01-01 DIAGNOSIS — R54 AGE-RELATED PHYSICAL DEBILITY: ICD-10-CM

## 2018-01-01 DIAGNOSIS — E83.51 HYPOCALCEMIA: Primary | ICD-10-CM

## 2018-01-01 DIAGNOSIS — R79.9 ELEVATED BUN: ICD-10-CM

## 2018-01-01 DIAGNOSIS — G89.29 CHRONIC PAIN OF LEFT KNEE: ICD-10-CM

## 2018-01-01 DIAGNOSIS — I10 ESSENTIAL HYPERTENSION: Primary | ICD-10-CM

## 2018-01-01 DIAGNOSIS — R53.1 WEAKNESS: ICD-10-CM

## 2018-01-01 DIAGNOSIS — R63.0 ANOREXIA: ICD-10-CM

## 2018-01-01 DIAGNOSIS — I38 HEART VALVE DISORDER: ICD-10-CM

## 2018-01-01 DIAGNOSIS — I82.401 ACUTE DEEP VEIN THROMBOSIS (DVT) OF RIGHT LOWER EXTREMITY, UNSPECIFIED VEIN (HCC): ICD-10-CM

## 2018-01-01 DIAGNOSIS — Z74.09 IMPAIRED MOBILITY AND ACTIVITIES OF DAILY LIVING: ICD-10-CM

## 2018-01-01 DIAGNOSIS — I10 ESSENTIAL HYPERTENSION: Chronic | ICD-10-CM

## 2018-01-01 DIAGNOSIS — I95.2 HYPOTENSION DUE TO DRUGS: ICD-10-CM

## 2018-01-01 DIAGNOSIS — R29.6 REPEATED FALLS: ICD-10-CM

## 2018-01-01 DIAGNOSIS — M54.2 NECK PAIN: Primary | ICD-10-CM

## 2018-01-01 DIAGNOSIS — Z48.02 ENCOUNTER FOR REMOVAL OF SUTURES: ICD-10-CM

## 2018-01-01 DIAGNOSIS — G89.29 CHRONIC PAIN OF LEFT KNEE: Primary | ICD-10-CM

## 2018-01-01 DIAGNOSIS — M15.9 PRIMARY OSTEOARTHRITIS INVOLVING MULTIPLE JOINTS: ICD-10-CM

## 2018-01-01 DIAGNOSIS — Z95.1 S/P CABG (CORONARY ARTERY BYPASS GRAFT): ICD-10-CM

## 2018-01-01 DIAGNOSIS — S81.812D LACERATION OF LEFT LOWER LEG, SUBSEQUENT ENCOUNTER: ICD-10-CM

## 2018-01-01 DIAGNOSIS — J40 BRONCHITIS: ICD-10-CM

## 2018-01-01 DIAGNOSIS — R60.0 LOCALIZED EDEMA: Primary | ICD-10-CM

## 2018-01-01 DIAGNOSIS — N30.01 ACUTE CYSTITIS WITH HEMATURIA: Primary | ICD-10-CM

## 2018-01-01 DIAGNOSIS — I95.2 HYPOTENSION DUE TO DRUGS: Primary | ICD-10-CM

## 2018-01-01 DIAGNOSIS — M54.50 CHRONIC MIDLINE LOW BACK PAIN WITHOUT SCIATICA: ICD-10-CM

## 2018-01-01 DIAGNOSIS — R60.0 UNILATERAL EDEMA OF LOWER EXTREMITY: Primary | ICD-10-CM

## 2018-01-01 DIAGNOSIS — R19.7 DIARRHEA OF PRESUMED INFECTIOUS ORIGIN: ICD-10-CM

## 2018-01-01 DIAGNOSIS — I50.9 CONGESTIVE HEART FAILURE, UNSPECIFIED CONGESTIVE HEART FAILURE CHRONICITY, UNSPECIFIED CONGESTIVE HEART FAILURE TYPE: ICD-10-CM

## 2018-01-01 DIAGNOSIS — I25.10 CORONARY ARTERY DISEASE INVOLVING NATIVE CORONARY ARTERY OF NATIVE HEART WITHOUT ANGINA PECTORIS: ICD-10-CM

## 2018-01-01 DIAGNOSIS — N17.9 AKI (ACUTE KIDNEY INJURY) (HCC): ICD-10-CM

## 2018-01-01 DIAGNOSIS — Z78.9 IMPAIRED MOBILITY AND ACTIVITIES OF DAILY LIVING: ICD-10-CM

## 2018-01-01 DIAGNOSIS — S81.812A LACERATION OF LEFT LOWER LEG, INITIAL ENCOUNTER: Primary | ICD-10-CM

## 2018-01-01 DIAGNOSIS — I73.9 PERIPHERAL VASCULAR DISEASE (HCC): Primary | ICD-10-CM

## 2018-01-01 DIAGNOSIS — E78.2 MIXED HYPERLIPIDEMIA: ICD-10-CM

## 2018-01-01 DIAGNOSIS — S81.801A OPEN WOUND OF RIGHT LOWER LEG: ICD-10-CM

## 2018-01-01 DIAGNOSIS — M81.0 OSTEOPOROSIS, UNSPECIFIED OSTEOPOROSIS TYPE, UNSPECIFIED PATHOLOGICAL FRACTURE PRESENCE: Primary | ICD-10-CM

## 2018-01-01 DIAGNOSIS — E83.51 HYPOCALCEMIA: ICD-10-CM

## 2018-01-01 DIAGNOSIS — N28.9 ACUTE RENAL INSUFFICIENCY: Primary | ICD-10-CM

## 2018-01-01 LAB
25(OH)D3 SERPL-MCNC: <12.8 NG/ML (ref 30–100)
ALBUMIN SERPL-MCNC: 2.3 G/DL (ref 2.9–4.4)
ALBUMIN SERPL-MCNC: 2.5 G/DL (ref 2.9–4.4)
ALBUMIN SERPL-MCNC: 2.5 G/DL (ref 3.4–4.8)
ALBUMIN SERPL-MCNC: 3 G/DL (ref 3.4–4.8)
ALBUMIN SERPL-MCNC: 3 G/DL (ref 3.4–4.8)
ALBUMIN SERPL-MCNC: 3.1 G/DL (ref 3.4–4.8)
ALBUMIN SERPL-MCNC: 3.3 G/DL (ref 3.4–4.8)
ALBUMIN SERPL-MCNC: 3.4 G/DL (ref 3.4–4.8)
ALBUMIN SERPL-MCNC: 3.5 G/DL (ref 3.4–4.8)
ALBUMIN SERPL-MCNC: 3.5 G/DL (ref 3.4–4.8)
ALBUMIN SERPL-MCNC: 4.3 G/DL (ref 3.4–4.8)
ALBUMIN/GLOB SERPL: 0.8 {RATIO} (ref 0.7–1.7)
ALBUMIN/GLOB SERPL: 0.9 G/DL (ref 1.1–1.8)
ALBUMIN/GLOB SERPL: 0.9 {RATIO} (ref 0.7–1.7)
ALBUMIN/GLOB SERPL: 1 G/DL (ref 1.1–1.8)
ALBUMIN/GLOB SERPL: 1.1 G/DL (ref 1.1–1.8)
ALP SERPL-CCNC: 53 U/L (ref 38–126)
ALP SERPL-CCNC: 54 U/L (ref 38–126)
ALP SERPL-CCNC: 60 U/L (ref 38–126)
ALP SERPL-CCNC: 64 U/L (ref 38–126)
ALP SERPL-CCNC: 73 U/L (ref 38–126)
ALP SERPL-CCNC: 80 U/L (ref 38–126)
ALP SERPL-CCNC: 83 U/L (ref 38–126)
ALP SERPL-CCNC: 94 U/L (ref 38–126)
ALPHA1 GLOB FLD ELPH-MCNC: 0.3 G/DL (ref 0–0.4)
ALPHA1 GLOB FLD ELPH-MCNC: 0.3 G/DL (ref 0–0.4)
ALPHA2 GLOB SERPL ELPH-MCNC: 0.9 G/DL (ref 0.4–1)
ALPHA2 GLOB SERPL ELPH-MCNC: 1.2 G/DL (ref 0.4–1)
ALT SERPL W P-5'-P-CCNC: 11 U/L (ref 9–52)
ALT SERPL W P-5'-P-CCNC: 18 U/L (ref 9–52)
ALT SERPL W P-5'-P-CCNC: 19 U/L (ref 9–52)
ALT SERPL W P-5'-P-CCNC: 19 U/L (ref 9–52)
ALT SERPL W P-5'-P-CCNC: 20 U/L (ref 9–52)
ALT SERPL W P-5'-P-CCNC: 26 U/L (ref 9–52)
ALT SERPL W P-5'-P-CCNC: 28 U/L (ref 9–52)
ALT SERPL W P-5'-P-CCNC: 37 U/L (ref 9–52)
ANION GAP SERPL CALCULATED.3IONS-SCNC: 10 MMOL/L (ref 5–15)
ANION GAP SERPL CALCULATED.3IONS-SCNC: 11 MMOL/L (ref 5–15)
ANION GAP SERPL CALCULATED.3IONS-SCNC: 12 MMOL/L (ref 5–15)
ANION GAP SERPL CALCULATED.3IONS-SCNC: 13 MMOL/L (ref 5–15)
ANION GAP SERPL CALCULATED.3IONS-SCNC: 13 MMOL/L (ref 5–15)
ANION GAP SERPL CALCULATED.3IONS-SCNC: 15 MMOL/L (ref 5–15)
ANION GAP SERPL CALCULATED.3IONS-SCNC: 6 MMOL/L (ref 5–15)
ANION GAP SERPL CALCULATED.3IONS-SCNC: 7 MMOL/L (ref 5–15)
ANION GAP SERPL CALCULATED.3IONS-SCNC: 8 MMOL/L (ref 5–15)
ANION GAP SERPL CALCULATED.3IONS-SCNC: 8 MMOL/L (ref 5–15)
ANION GAP SERPL CALCULATED.3IONS-SCNC: 9 MMOL/L (ref 5–15)
ANISOCYTOSIS BLD QL: ABNORMAL
APTT PPP: 34 SECONDS (ref 20–40.3)
AST SERPL-CCNC: 19 U/L (ref 14–36)
AST SERPL-CCNC: 20 U/L (ref 14–36)
AST SERPL-CCNC: 21 U/L (ref 14–36)
AST SERPL-CCNC: 23 U/L (ref 14–36)
AST SERPL-CCNC: 24 U/L (ref 14–36)
AST SERPL-CCNC: 25 U/L (ref 14–36)
AST SERPL-CCNC: 28 U/L (ref 14–36)
AST SERPL-CCNC: 30 U/L (ref 14–36)
B PERT DNA SPEC QL NAA+PROBE: NOT DETECTED
B-GLOBULIN SERPL ELPH-MCNC: 0.6 G/DL (ref 0.7–1.3)
B-GLOBULIN SERPL ELPH-MCNC: 0.7 G/DL (ref 0.7–1.3)
BACTERIA SPEC AEROBE CULT: ABNORMAL
BACTERIA SPEC AEROBE CULT: NORMAL
BACTERIA UR QL AUTO: ABNORMAL /HPF
BASOPHILS # BLD AUTO: 0.01 10*3/MM3 (ref 0–0.2)
BASOPHILS # BLD AUTO: 0.01 10*3/MM3 (ref 0–0.2)
BASOPHILS # BLD AUTO: 0.02 10*3/MM3 (ref 0–0.2)
BASOPHILS # BLD AUTO: 0.03 10*3/MM3 (ref 0–0.2)
BASOPHILS # BLD AUTO: 0.04 10*3/MM3 (ref 0–0.2)
BASOPHILS # BLD AUTO: 0.04 10*3/MM3 (ref 0–0.2)
BASOPHILS # BLD AUTO: 0.05 10*3/MM3 (ref 0–0.2)
BASOPHILS # BLD AUTO: 0.07 10*3/MM3 (ref 0–0.2)
BASOPHILS # BLD AUTO: 0.07 10*3/MM3 (ref 0–0.2)
BASOPHILS NFR BLD AUTO: 0.1 % (ref 0–2)
BASOPHILS NFR BLD AUTO: 0.1 % (ref 0–2)
BASOPHILS NFR BLD AUTO: 0.2 % (ref 0–2)
BASOPHILS NFR BLD AUTO: 0.3 % (ref 0–2)
BASOPHILS NFR BLD AUTO: 0.4 % (ref 0–2)
BASOPHILS NFR BLD AUTO: 0.4 % (ref 0–2)
BASOPHILS NFR BLD AUTO: 0.6 % (ref 0–2)
BASOPHILS NFR BLD AUTO: 0.7 % (ref 0–2)
BILIRUB SERPL-MCNC: 0.3 MG/DL (ref 0.2–1.3)
BILIRUB SERPL-MCNC: 0.4 MG/DL (ref 0.2–1.3)
BILIRUB SERPL-MCNC: 0.5 MG/DL (ref 0.2–1.3)
BILIRUB SERPL-MCNC: 0.6 MG/DL (ref 0.2–1.3)
BILIRUB UR QL STRIP: NEGATIVE
BUN BLD-MCNC: 13 MG/DL (ref 7–21)
BUN BLD-MCNC: 14 MG/DL (ref 7–21)
BUN BLD-MCNC: 16 MG/DL (ref 7–21)
BUN BLD-MCNC: 17 MG/DL (ref 7–21)
BUN BLD-MCNC: 17 MG/DL (ref 7–21)
BUN BLD-MCNC: 18 MG/DL (ref 7–21)
BUN BLD-MCNC: 19 MG/DL (ref 7–21)
BUN BLD-MCNC: 19 MG/DL (ref 7–21)
BUN BLD-MCNC: 21 MG/DL (ref 7–21)
BUN BLD-MCNC: 22 MG/DL (ref 7–21)
BUN BLD-MCNC: 25 MG/DL (ref 7–21)
BUN BLD-MCNC: 27 MG/DL (ref 7–21)
BUN BLD-MCNC: 29 MG/DL (ref 7–21)
BUN BLD-MCNC: 34 MG/DL (ref 7–21)
BUN BLD-MCNC: 37 MG/DL (ref 7–21)
BUN BLD-MCNC: 38 MG/DL (ref 7–21)
BUN BLD-MCNC: 40 MG/DL (ref 7–21)
BUN BLD-MCNC: 43 MG/DL (ref 7–21)
BUN BLD-MCNC: 45 MG/DL (ref 7–21)
BUN BLD-MCNC: 48 MG/DL (ref 7–21)
BUN/CREAT SERPL: 16.1 (ref 7–25)
BUN/CREAT SERPL: 18.8 (ref 7–25)
BUN/CREAT SERPL: 18.9 (ref 7–25)
BUN/CREAT SERPL: 19.1 (ref 7–25)
BUN/CREAT SERPL: 19.3 (ref 7–25)
BUN/CREAT SERPL: 20 (ref 7–25)
BUN/CREAT SERPL: 20 (ref 7–25)
BUN/CREAT SERPL: 20.9 (ref 7–25)
BUN/CREAT SERPL: 22.5 (ref 7–25)
BUN/CREAT SERPL: 23 (ref 7–25)
BUN/CREAT SERPL: 23.9 (ref 7–25)
BUN/CREAT SERPL: 24.1 (ref 7–25)
BUN/CREAT SERPL: 26.2 (ref 7–25)
BUN/CREAT SERPL: 26.4 (ref 7–25)
BUN/CREAT SERPL: 27.8 (ref 7–25)
BUN/CREAT SERPL: 29 (ref 7–25)
BUN/CREAT SERPL: 30 (ref 7–25)
BUN/CREAT SERPL: 32.6 (ref 7–25)
BUN/CREAT SERPL: 32.8 (ref 7–25)
BUN/CREAT SERPL: 33 (ref 7–25)
BUN/CREAT SERPL: 33.6 (ref 7–25)
BUN/CREAT SERPL: 34.5 (ref 7–25)
C PNEUM DNA NPH QL NAA+NON-PROBE: NOT DETECTED
CA-I BLD-MCNC: 2.85 MG/DL (ref 4.6–5.6)
CA-I BLD-MCNC: 3.19 MG/DL (ref 4.6–5.6)
CA-I BLD-MCNC: 3.58 MG/DL (ref 4.6–5.6)
CA-I BLD-MCNC: 3.61 MG/DL (ref 4.6–5.6)
CALCIUM SPEC-SCNC: 5.6 MG/DL (ref 8.4–10.2)
CALCIUM SPEC-SCNC: 5.7 MG/DL (ref 8.4–10.2)
CALCIUM SPEC-SCNC: 5.7 MG/DL (ref 8.4–10.2)
CALCIUM SPEC-SCNC: 6.3 MG/DL (ref 8.4–10.2)
CALCIUM SPEC-SCNC: 7 MG/DL (ref 8.4–10.2)
CALCIUM SPEC-SCNC: 7 MG/DL (ref 8.4–10.2)
CALCIUM SPEC-SCNC: 7.2 MG/DL (ref 8.4–10.2)
CALCIUM SPEC-SCNC: 7.4 MG/DL (ref 8.4–10.2)
CALCIUM SPEC-SCNC: 7.7 MG/DL (ref 8.4–10.2)
CALCIUM SPEC-SCNC: 7.8 MG/DL (ref 8.4–10.2)
CALCIUM SPEC-SCNC: 7.9 MG/DL (ref 8.4–10.2)
CALCIUM SPEC-SCNC: 7.9 MG/DL (ref 8.4–10.2)
CALCIUM SPEC-SCNC: 8.2 MG/DL (ref 8.4–10.2)
CALCIUM SPEC-SCNC: 8.6 MG/DL (ref 8.4–10.2)
CALCIUM SPEC-SCNC: 8.7 MG/DL (ref 8.4–10.2)
CALCIUM SPEC-SCNC: 8.8 MG/DL (ref 8.4–10.2)
CALCIUM SPEC-SCNC: 8.8 MG/DL (ref 8.4–10.2)
CALCIUM SPEC-SCNC: 8.9 MG/DL (ref 8.4–10.2)
CALCIUM SPEC-SCNC: 8.9 MG/DL (ref 8.4–10.2)
CALCIUM SPEC-SCNC: 9.4 MG/DL (ref 8.4–10.2)
CALCIUM SPEC-SCNC: 9.4 MG/DL (ref 8.4–10.2)
CALCIUM SPEC-SCNC: 9.7 MG/DL (ref 8.4–10.2)
CALCIUM SPEC-SCNC: 9.7 MG/DL (ref 8.4–10.2)
CALCIUM SPEC-SCNC: 9.9 MG/DL (ref 8.4–10.2)
CAMPYLOBACTER STL CULT: NORMAL
CHLORIDE SERPL-SCNC: 101 MMOL/L (ref 95–110)
CHLORIDE SERPL-SCNC: 103 MMOL/L (ref 95–110)
CHLORIDE SERPL-SCNC: 104 MMOL/L (ref 95–110)
CHLORIDE SERPL-SCNC: 106 MMOL/L (ref 95–110)
CHLORIDE SERPL-SCNC: 108 MMOL/L (ref 95–110)
CHLORIDE SERPL-SCNC: 109 MMOL/L (ref 95–110)
CHLORIDE SERPL-SCNC: 110 MMOL/L (ref 95–110)
CHLORIDE SERPL-SCNC: 111 MMOL/L (ref 95–110)
CHLORIDE SERPL-SCNC: 112 MMOL/L (ref 95–110)
CHLORIDE SERPL-SCNC: 113 MMOL/L (ref 95–110)
CHLORIDE SERPL-SCNC: 113 MMOL/L (ref 95–110)
CHLORIDE SERPL-SCNC: 114 MMOL/L (ref 95–110)
CHLORIDE SERPL-SCNC: 114 MMOL/L (ref 95–110)
CHLORIDE SERPL-SCNC: 115 MMOL/L (ref 95–110)
CHLORIDE SERPL-SCNC: 116 MMOL/L (ref 95–110)
CHLORIDE SERPL-SCNC: 117 MMOL/L (ref 95–110)
CHLORIDE SERPL-SCNC: 118 MMOL/L (ref 95–110)
CHLORIDE SERPL-SCNC: 119 MMOL/L (ref 95–110)
CLARITY UR: ABNORMAL
CLARITY UR: ABNORMAL
CLARITY UR: CLEAR
CLUMPED PLATELETS: PRESENT
CO2 SERPL-SCNC: 12 MMOL/L (ref 22–31)
CO2 SERPL-SCNC: 13 MMOL/L (ref 22–31)
CO2 SERPL-SCNC: 14 MMOL/L (ref 22–31)
CO2 SERPL-SCNC: 15 MMOL/L (ref 22–31)
CO2 SERPL-SCNC: 15 MMOL/L (ref 22–31)
CO2 SERPL-SCNC: 16 MMOL/L (ref 22–31)
CO2 SERPL-SCNC: 17 MMOL/L (ref 22–31)
CO2 SERPL-SCNC: 19 MMOL/L (ref 22–31)
CO2 SERPL-SCNC: 19 MMOL/L (ref 22–31)
CO2 SERPL-SCNC: 21 MMOL/L (ref 22–31)
CO2 SERPL-SCNC: 21 MMOL/L (ref 22–31)
CO2 SERPL-SCNC: 27 MMOL/L (ref 22–31)
COLOR UR: YELLOW
CREAT BLD-MCNC: 0.69 MG/DL (ref 0.5–1)
CREAT BLD-MCNC: 0.74 MG/DL (ref 0.5–1)
CREAT BLD-MCNC: 0.74 MG/DL (ref 0.5–1)
CREAT BLD-MCNC: 0.79 MG/DL (ref 0.5–1)
CREAT BLD-MCNC: 0.83 MG/DL (ref 0.5–1)
CREAT BLD-MCNC: 0.84 MG/DL (ref 0.5–1)
CREAT BLD-MCNC: 0.86 MG/DL (ref 0.5–1)
CREAT BLD-MCNC: 0.88 MG/DL (ref 0.5–1)
CREAT BLD-MCNC: 0.89 MG/DL (ref 0.5–1)
CREAT BLD-MCNC: 0.89 MG/DL (ref 0.5–1)
CREAT BLD-MCNC: 0.9 MG/DL (ref 0.5–1)
CREAT BLD-MCNC: 0.9 MG/DL (ref 0.5–1)
CREAT BLD-MCNC: 0.93 MG/DL (ref 0.5–1)
CREAT BLD-MCNC: 1.03 MG/DL (ref 0.5–1)
CREAT BLD-MCNC: 1.1 MG/DL (ref 0.5–1)
CREAT BLD-MCNC: 1.11 MG/DL (ref 0.5–1)
CREAT BLD-MCNC: 1.18 MG/DL (ref 0.5–1)
CREAT BLD-MCNC: 1.28 MG/DL (ref 0.5–1)
CREAT BLD-MCNC: 1.37 MG/DL (ref 0.5–1)
CREAT BLD-MCNC: 1.4 MG/DL (ref 0.5–1)
CREAT BLD-MCNC: 1.44 MG/DL (ref 0.5–1)
CREAT BLD-MCNC: 1.6 MG/DL (ref 0.5–1)
D-DIMER, QUANTITATIVE (MAD,POW, STR): 866 NG/ML (FEU) (ref 0–470)
DEPRECATED RDW RBC AUTO: 55.7 FL (ref 36.4–46.3)
DEPRECATED RDW RBC AUTO: 56.8 FL (ref 36.4–46.3)
DEPRECATED RDW RBC AUTO: 57.3 FL (ref 36.4–46.3)
DEPRECATED RDW RBC AUTO: 57.4 FL (ref 36.4–46.3)
DEPRECATED RDW RBC AUTO: 57.7 FL (ref 36.4–46.3)
DEPRECATED RDW RBC AUTO: 58.2 FL (ref 36.4–46.3)
DEPRECATED RDW RBC AUTO: 58.3 FL (ref 36.4–46.3)
DEPRECATED RDW RBC AUTO: 58.4 FL (ref 36.4–46.3)
DEPRECATED RDW RBC AUTO: 58.6 FL (ref 36.4–46.3)
DEPRECATED RDW RBC AUTO: 59 FL (ref 36.4–46.3)
DEPRECATED RDW RBC AUTO: 60 FL (ref 36.4–46.3)
DEPRECATED RDW RBC AUTO: 61.1 FL (ref 36.4–46.3)
DEPRECATED RDW RBC AUTO: 62 FL (ref 36.4–46.3)
DEPRECATED RDW RBC AUTO: 62.3 FL (ref 36.4–46.3)
DEPRECATED RDW RBC AUTO: 63.4 FL (ref 36.4–46.3)
DEPRECATED RDW RBC AUTO: 63.9 FL (ref 36.4–46.3)
DEPRECATED RDW RBC AUTO: 64.4 FL (ref 36.4–46.3)
DEPRECATED RDW RBC AUTO: 66.2 FL (ref 36.4–46.3)
DEPRECATED RDW RBC AUTO: 67.5 FL (ref 36.4–46.3)
E COLI SXT STL QL IA: NEGATIVE
EOSINOPHIL # BLD AUTO: 0.06 10*3/MM3 (ref 0–0.7)
EOSINOPHIL # BLD AUTO: 0.08 10*3/MM3 (ref 0–0.7)
EOSINOPHIL # BLD AUTO: 0.08 10*3/MM3 (ref 0–0.7)
EOSINOPHIL # BLD AUTO: 0.09 10*3/MM3 (ref 0–0.7)
EOSINOPHIL # BLD AUTO: 0.09 10*3/MM3 (ref 0–0.7)
EOSINOPHIL # BLD AUTO: 0.14 10*3/MM3 (ref 0–0.7)
EOSINOPHIL # BLD AUTO: 0.15 10*3/MM3 (ref 0–0.7)
EOSINOPHIL # BLD AUTO: 0.16 10*3/MM3 (ref 0–0.7)
EOSINOPHIL # BLD AUTO: 0.16 10*3/MM3 (ref 0–0.7)
EOSINOPHIL # BLD AUTO: 0.17 10*3/MM3 (ref 0–0.7)
EOSINOPHIL # BLD AUTO: 0.19 10*3/MM3 (ref 0–0.7)
EOSINOPHIL # BLD AUTO: 0.22 10*3/MM3 (ref 0–0.7)
EOSINOPHIL # BLD AUTO: 0.23 10*3/MM3 (ref 0–0.7)
EOSINOPHIL # BLD AUTO: 0.28 10*3/MM3 (ref 0–0.7)
EOSINOPHIL # BLD AUTO: 0.28 10*3/MM3 (ref 0–0.7)
EOSINOPHIL # BLD AUTO: 0.3 10*3/MM3 (ref 0–0.7)
EOSINOPHIL # BLD AUTO: 0.31 10*3/MM3 (ref 0–0.7)
EOSINOPHIL # BLD MANUAL: 0.24 10*3/MM3 (ref 0–0.7)
EOSINOPHIL # BLD MANUAL: 0.3 10*3/MM3 (ref 0–0.7)
EOSINOPHIL NFR BLD AUTO: 0.4 % (ref 0–7)
EOSINOPHIL NFR BLD AUTO: 0.5 % (ref 0–7)
EOSINOPHIL NFR BLD AUTO: 0.7 % (ref 0–7)
EOSINOPHIL NFR BLD AUTO: 0.8 % (ref 0–7)
EOSINOPHIL NFR BLD AUTO: 0.9 % (ref 0–7)
EOSINOPHIL NFR BLD AUTO: 1.2 % (ref 0–7)
EOSINOPHIL NFR BLD AUTO: 1.2 % (ref 0–7)
EOSINOPHIL NFR BLD AUTO: 1.4 % (ref 0–7)
EOSINOPHIL NFR BLD AUTO: 1.7 % (ref 0–7)
EOSINOPHIL NFR BLD AUTO: 1.9 % (ref 0–7)
EOSINOPHIL NFR BLD AUTO: 2.1 % (ref 0–7)
EOSINOPHIL NFR BLD AUTO: 2.2 % (ref 0–7)
EOSINOPHIL NFR BLD AUTO: 2.3 % (ref 0–7)
EOSINOPHIL NFR BLD AUTO: 2.4 % (ref 0–7)
EOSINOPHIL NFR BLD AUTO: 2.4 % (ref 0–7)
EOSINOPHIL NFR BLD AUTO: 2.5 % (ref 0–7)
EOSINOPHIL NFR BLD AUTO: 2.6 % (ref 0–7)
EOSINOPHIL NFR BLD MANUAL: 2 % (ref 0–7)
EOSINOPHIL NFR BLD MANUAL: 2 % (ref 0–7)
ERYTHROCYTE [DISTWIDTH] IN BLOOD BY AUTOMATED COUNT: 16.1 % (ref 11.5–14.5)
ERYTHROCYTE [DISTWIDTH] IN BLOOD BY AUTOMATED COUNT: 16.4 % (ref 11.5–14.5)
ERYTHROCYTE [DISTWIDTH] IN BLOOD BY AUTOMATED COUNT: 16.5 % (ref 11.5–14.5)
ERYTHROCYTE [DISTWIDTH] IN BLOOD BY AUTOMATED COUNT: 16.6 % (ref 11.5–14.5)
ERYTHROCYTE [DISTWIDTH] IN BLOOD BY AUTOMATED COUNT: 16.7 % (ref 11.5–14.5)
ERYTHROCYTE [DISTWIDTH] IN BLOOD BY AUTOMATED COUNT: 16.8 % (ref 11.5–14.5)
ERYTHROCYTE [DISTWIDTH] IN BLOOD BY AUTOMATED COUNT: 16.9 % (ref 11.5–14.5)
ERYTHROCYTE [DISTWIDTH] IN BLOOD BY AUTOMATED COUNT: 17 % (ref 11.5–14.5)
ERYTHROCYTE [DISTWIDTH] IN BLOOD BY AUTOMATED COUNT: 17.2 % (ref 11.5–14.5)
ERYTHROCYTE [DISTWIDTH] IN BLOOD BY AUTOMATED COUNT: 17.2 % (ref 11.5–14.5)
ERYTHROCYTE [DISTWIDTH] IN BLOOD BY AUTOMATED COUNT: 17.5 % (ref 11.5–14.5)
ERYTHROCYTE [DISTWIDTH] IN BLOOD BY AUTOMATED COUNT: 17.9 % (ref 11.5–14.5)
ERYTHROCYTE [DISTWIDTH] IN BLOOD BY AUTOMATED COUNT: 18 % (ref 11.5–14.5)
ERYTHROCYTE [DISTWIDTH] IN BLOOD BY AUTOMATED COUNT: 18.1 % (ref 11.5–14.5)
FLUAV H1 2009 PAND RNA NPH QL NAA+PROBE: NOT DETECTED
FLUAV H1 HA GENE NPH QL NAA+PROBE: NOT DETECTED
FLUAV H3 RNA NPH QL NAA+PROBE: NOT DETECTED
FLUAV SUBTYP SPEC NAA+PROBE: NOT DETECTED
FLUBV RNA ISLT QL NAA+PROBE: NOT DETECTED
GAMMA GLOB SERPL ELPH-MCNC: 0.8 G/DL (ref 0.4–1.8)
GAMMA GLOB SERPL ELPH-MCNC: 0.9 G/DL (ref 0.4–1.8)
GFR SERPL CREATININE-BSD FRML MDRD: 30 ML/MIN/1.73 (ref 39–90)
GFR SERPL CREATININE-BSD FRML MDRD: 34 ML/MIN/1.73 (ref 39–90)
GFR SERPL CREATININE-BSD FRML MDRD: 35 ML/MIN/1.73 (ref 39–90)
GFR SERPL CREATININE-BSD FRML MDRD: 36 ML/MIN/1.73 (ref 39–90)
GFR SERPL CREATININE-BSD FRML MDRD: 39 ML/MIN/1.73 (ref 39–90)
GFR SERPL CREATININE-BSD FRML MDRD: 43 ML/MIN/1.73 (ref 39–90)
GFR SERPL CREATININE-BSD FRML MDRD: 46 ML/MIN/1.73 (ref 39–90)
GFR SERPL CREATININE-BSD FRML MDRD: 46 ML/MIN/1.73 (ref 39–90)
GFR SERPL CREATININE-BSD FRML MDRD: 50 ML/MIN/1.73 (ref 39–90)
GFR SERPL CREATININE-BSD FRML MDRD: 56 ML/MIN/1.73 (ref 39–90)
GFR SERPL CREATININE-BSD FRML MDRD: 59 ML/MIN/1.73 (ref 39–90)
GFR SERPL CREATININE-BSD FRML MDRD: 60 ML/MIN/1.73 (ref 39–90)
GFR SERPL CREATININE-BSD FRML MDRD: 62 ML/MIN/1.73 (ref 39–90)
GFR SERPL CREATININE-BSD FRML MDRD: 63 ML/MIN/1.73 (ref 39–90)
GFR SERPL CREATININE-BSD FRML MDRD: 64 ML/MIN/1.73 (ref 39–90)
GFR SERPL CREATININE-BSD FRML MDRD: 68 ML/MIN/1.73 (ref 39–90)
GFR SERPL CREATININE-BSD FRML MDRD: 73 ML/MIN/1.73 (ref 39–90)
GFR SERPL CREATININE-BSD FRML MDRD: 73 ML/MIN/1.73 (ref 39–90)
GFR SERPL CREATININE-BSD FRML MDRD: 80 ML/MIN/1.73 (ref 39–90)
GLOBULIN SER CALC-MCNC: 2.6 G/DL (ref 2.2–3.9)
GLOBULIN SER CALC-MCNC: 3 G/DL (ref 2.2–3.9)
GLOBULIN UR ELPH-MCNC: 2.6 GM/DL (ref 2.3–3.5)
GLOBULIN UR ELPH-MCNC: 2.7 GM/DL (ref 2.3–3.5)
GLOBULIN UR ELPH-MCNC: 2.8 GM/DL (ref 2.3–3.5)
GLOBULIN UR ELPH-MCNC: 3.3 GM/DL (ref 2.3–3.5)
GLOBULIN UR ELPH-MCNC: 3.4 GM/DL (ref 2.3–3.5)
GLOBULIN UR ELPH-MCNC: 3.5 GM/DL (ref 2.3–3.5)
GLOBULIN UR ELPH-MCNC: 3.6 GM/DL (ref 2.3–3.5)
GLOBULIN UR ELPH-MCNC: 3.8 GM/DL (ref 2.3–3.5)
GLUCOSE BLD-MCNC: 101 MG/DL (ref 60–100)
GLUCOSE BLD-MCNC: 102 MG/DL (ref 60–100)
GLUCOSE BLD-MCNC: 71 MG/DL (ref 60–100)
GLUCOSE BLD-MCNC: 78 MG/DL (ref 60–100)
GLUCOSE BLD-MCNC: 82 MG/DL (ref 60–100)
GLUCOSE BLD-MCNC: 83 MG/DL (ref 60–100)
GLUCOSE BLD-MCNC: 85 MG/DL (ref 60–100)
GLUCOSE BLD-MCNC: 87 MG/DL (ref 60–100)
GLUCOSE BLD-MCNC: 87 MG/DL (ref 60–100)
GLUCOSE BLD-MCNC: 90 MG/DL (ref 60–100)
GLUCOSE BLD-MCNC: 91 MG/DL (ref 60–100)
GLUCOSE BLD-MCNC: 92 MG/DL (ref 60–100)
GLUCOSE BLD-MCNC: 92 MG/DL (ref 60–100)
GLUCOSE BLD-MCNC: 93 MG/DL (ref 60–100)
GLUCOSE BLD-MCNC: 96 MG/DL (ref 60–100)
GLUCOSE BLD-MCNC: 98 MG/DL (ref 60–100)
GLUCOSE BLD-MCNC: 99 MG/DL (ref 60–100)
GLUCOSE UR STRIP-MCNC: NEGATIVE MG/DL
HADV DNA SPEC NAA+PROBE: NOT DETECTED
HCOV 229E RNA SPEC QL NAA+PROBE: NOT DETECTED
HCOV HKU1 RNA SPEC QL NAA+PROBE: NOT DETECTED
HCOV NL63 RNA SPEC QL NAA+PROBE: NOT DETECTED
HCOV OC43 RNA SPEC QL NAA+PROBE: NOT DETECTED
HCT VFR BLD AUTO: 29.1 % (ref 35–45)
HCT VFR BLD AUTO: 30.5 % (ref 35–45)
HCT VFR BLD AUTO: 31.1 % (ref 35–45)
HCT VFR BLD AUTO: 31.6 % (ref 35–45)
HCT VFR BLD AUTO: 31.7 % (ref 35–45)
HCT VFR BLD AUTO: 32.2 % (ref 35–45)
HCT VFR BLD AUTO: 32.4 % (ref 35–45)
HCT VFR BLD AUTO: 32.4 % (ref 35–45)
HCT VFR BLD AUTO: 32.9 % (ref 35–45)
HCT VFR BLD AUTO: 34.2 % (ref 35–45)
HCT VFR BLD AUTO: 34.5 % (ref 35–45)
HCT VFR BLD AUTO: 34.5 % (ref 35–45)
HCT VFR BLD AUTO: 35.2 % (ref 35–45)
HCT VFR BLD AUTO: 36.2 % (ref 35–45)
HCT VFR BLD AUTO: 36.7 % (ref 35–45)
HCT VFR BLD AUTO: 37.8 % (ref 35–45)
HCT VFR BLD AUTO: 38.5 % (ref 35–45)
HCT VFR BLD AUTO: 38.7 % (ref 35–45)
HCT VFR BLD AUTO: 40.5 % (ref 35–45)
HEMOCCULT STL QL: NEGATIVE
HGB BLD-MCNC: 10.1 G/DL (ref 12–15.5)
HGB BLD-MCNC: 10.6 G/DL (ref 12–15.5)
HGB BLD-MCNC: 10.6 G/DL (ref 12–15.5)
HGB BLD-MCNC: 10.7 G/DL (ref 12–15.5)
HGB BLD-MCNC: 10.8 G/DL (ref 12–15.5)
HGB BLD-MCNC: 10.9 G/DL (ref 12–15.5)
HGB BLD-MCNC: 11.1 G/DL (ref 12–15.5)
HGB BLD-MCNC: 11.3 G/DL (ref 12–15.5)
HGB BLD-MCNC: 11.6 G/DL (ref 12–15.5)
HGB BLD-MCNC: 11.7 G/DL (ref 12–15.5)
HGB BLD-MCNC: 11.7 G/DL (ref 12–15.5)
HGB BLD-MCNC: 11.8 G/DL (ref 12–15.5)
HGB BLD-MCNC: 12.1 G/DL (ref 12–15.5)
HGB BLD-MCNC: 12.3 G/DL (ref 12–15.5)
HGB BLD-MCNC: 12.6 G/DL (ref 12–15.5)
HGB BLD-MCNC: 12.6 G/DL (ref 12–15.5)
HGB BLD-MCNC: 13.4 G/DL (ref 12–15.5)
HGB BLD-MCNC: 13.4 G/DL (ref 12–15.5)
HGB BLD-MCNC: 9.5 G/DL (ref 12–15.5)
HGB UR QL STRIP.AUTO: ABNORMAL
HGB UR QL STRIP.AUTO: NEGATIVE
HGB UR QL STRIP.AUTO: NEGATIVE
HMPV RNA NPH QL NAA+NON-PROBE: NOT DETECTED
HOLD SPECIMEN: NORMAL
HPIV1 RNA SPEC QL NAA+PROBE: NOT DETECTED
HPIV2 RNA SPEC QL NAA+PROBE: NOT DETECTED
HPIV3 RNA NPH QL NAA+PROBE: NOT DETECTED
HPIV4 P GENE NPH QL NAA+PROBE: NOT DETECTED
HYALINE CASTS UR QL AUTO: ABNORMAL /LPF
IGA SERPL-MCNC: 161 MG/DL (ref 64–422)
IGG SERPL-MCNC: 714 MG/DL (ref 700–1600)
IGM SERPL-MCNC: 185 MG/DL (ref 26–217)
IMM GRANULOCYTES # BLD: 0.05 10*3/MM3 (ref 0–0.02)
IMM GRANULOCYTES # BLD: 0.06 10*3/MM3 (ref 0–0.02)
IMM GRANULOCYTES # BLD: 0.06 10*3/MM3 (ref 0–0.02)
IMM GRANULOCYTES # BLD: 0.08 10*3/MM3 (ref 0–0.02)
IMM GRANULOCYTES # BLD: 0.12 10*3/MM3 (ref 0–0.02)
IMM GRANULOCYTES # BLD: 0.17 10*3/MM3 (ref 0–0.02)
IMM GRANULOCYTES # BLD: 0.21 10*3/MM3 (ref 0–0.02)
IMM GRANULOCYTES # BLD: 0.27 10*3/MM3 (ref 0–0.02)
IMM GRANULOCYTES # BLD: 0.29 10*3/MM3 (ref 0–0.02)
IMM GRANULOCYTES # BLD: 0.29 10*3/MM3 (ref 0–0.02)
IMM GRANULOCYTES # BLD: 0.34 10*3/MM3 (ref 0–0.02)
IMM GRANULOCYTES # BLD: 0.37 10*3/MM3 (ref 0–0.02)
IMM GRANULOCYTES # BLD: 0.42 10*3/MM3 (ref 0–0.02)
IMM GRANULOCYTES # BLD: 0.45 10*3/MM3 (ref 0–0.02)
IMM GRANULOCYTES # BLD: 0.59 10*3/MM3 (ref 0–0.02)
IMM GRANULOCYTES # BLD: 0.68 10*3/MM3 (ref 0–0.02)
IMM GRANULOCYTES # BLD: 0.73 10*3/MM3 (ref 0–0.02)
IMM GRANULOCYTES NFR BLD: 0.6 % (ref 0–0.5)
IMM GRANULOCYTES NFR BLD: 0.6 % (ref 0–0.5)
IMM GRANULOCYTES NFR BLD: 0.8 % (ref 0–0.5)
IMM GRANULOCYTES NFR BLD: 1 % (ref 0–0.5)
IMM GRANULOCYTES NFR BLD: 1.1 % (ref 0–0.5)
IMM GRANULOCYTES NFR BLD: 1.1 % (ref 0–0.5)
IMM GRANULOCYTES NFR BLD: 2.1 % (ref 0–0.5)
IMM GRANULOCYTES NFR BLD: 2.2 % (ref 0–0.5)
IMM GRANULOCYTES NFR BLD: 2.4 % (ref 0–0.5)
IMM GRANULOCYTES NFR BLD: 2.7 % (ref 0–0.5)
IMM GRANULOCYTES NFR BLD: 2.9 % (ref 0–0.5)
IMM GRANULOCYTES NFR BLD: 3.2 % (ref 0–0.5)
IMM GRANULOCYTES NFR BLD: 3.2 % (ref 0–0.5)
IMM GRANULOCYTES NFR BLD: 3.9 % (ref 0–0.5)
IMM GRANULOCYTES NFR BLD: 4.4 % (ref 0–0.5)
IMM GRANULOCYTES NFR BLD: 4.6 % (ref 0–0.5)
IMM GRANULOCYTES NFR BLD: 6 % (ref 0–0.5)
INR PPP: 1 (ref 0.8–1.2)
INR PPP: 1.29 (ref 0.8–1.2)
INTERPRETATION SERPL IEP-IMP: ABNORMAL
KETONES UR QL STRIP: NEGATIVE
LACTOFERRIN STL QL LA: POSITIVE
LEUKOCYTE ESTERASE UR QL STRIP.AUTO: ABNORMAL
LEUKOCYTE ESTERASE UR QL STRIP.AUTO: NEGATIVE
LEUKOCYTE ESTERASE UR QL STRIP.AUTO: NEGATIVE
LIPASE SERPL-CCNC: 37 U/L (ref 23–300)
LYMPHOCYTES # BLD AUTO: 1.58 10*3/MM3 (ref 0.6–4.2)
LYMPHOCYTES # BLD AUTO: 1.58 10*3/MM3 (ref 0.6–4.2)
LYMPHOCYTES # BLD AUTO: 1.59 10*3/MM3 (ref 0.6–4.2)
LYMPHOCYTES # BLD AUTO: 1.61 10*3/MM3 (ref 0.6–4.2)
LYMPHOCYTES # BLD AUTO: 1.73 10*3/MM3 (ref 0.6–4.2)
LYMPHOCYTES # BLD AUTO: 1.81 10*3/MM3 (ref 0.6–4.2)
LYMPHOCYTES # BLD AUTO: 1.83 10*3/MM3 (ref 0.6–4.2)
LYMPHOCYTES # BLD AUTO: 1.87 10*3/MM3 (ref 0.6–4.2)
LYMPHOCYTES # BLD AUTO: 1.88 10*3/MM3 (ref 0.6–4.2)
LYMPHOCYTES # BLD AUTO: 1.92 10*3/MM3 (ref 0.6–4.2)
LYMPHOCYTES # BLD AUTO: 2.11 10*3/MM3 (ref 0.6–4.2)
LYMPHOCYTES # BLD AUTO: 2.24 10*3/MM3 (ref 0.6–4.2)
LYMPHOCYTES # BLD AUTO: 2.27 10*3/MM3 (ref 0.6–4.2)
LYMPHOCYTES # BLD AUTO: 2.38 10*3/MM3 (ref 0.6–4.2)
LYMPHOCYTES # BLD AUTO: 2.61 10*3/MM3 (ref 0.6–4.2)
LYMPHOCYTES # BLD AUTO: 3.01 10*3/MM3 (ref 0.6–4.2)
LYMPHOCYTES # BLD AUTO: 3.53 10*3/MM3 (ref 0.6–4.2)
LYMPHOCYTES # BLD MANUAL: 2.51 10*3/MM3 (ref 0.6–4.2)
LYMPHOCYTES # BLD MANUAL: 2.83 10*3/MM3 (ref 0.6–4.2)
LYMPHOCYTES NFR BLD AUTO: 12.4 % (ref 10–50)
LYMPHOCYTES NFR BLD AUTO: 13.6 % (ref 10–50)
LYMPHOCYTES NFR BLD AUTO: 13.8 % (ref 10–50)
LYMPHOCYTES NFR BLD AUTO: 13.8 % (ref 10–50)
LYMPHOCYTES NFR BLD AUTO: 14.2 % (ref 10–50)
LYMPHOCYTES NFR BLD AUTO: 16.2 % (ref 10–50)
LYMPHOCYTES NFR BLD AUTO: 17.6 % (ref 10–50)
LYMPHOCYTES NFR BLD AUTO: 17.7 % (ref 10–50)
LYMPHOCYTES NFR BLD AUTO: 18.1 % (ref 10–50)
LYMPHOCYTES NFR BLD AUTO: 18.3 % (ref 10–50)
LYMPHOCYTES NFR BLD AUTO: 19.6 % (ref 10–50)
LYMPHOCYTES NFR BLD AUTO: 20.3 % (ref 10–50)
LYMPHOCYTES NFR BLD AUTO: 21.9 % (ref 10–50)
LYMPHOCYTES NFR BLD AUTO: 23.8 % (ref 10–50)
LYMPHOCYTES NFR BLD AUTO: 24.2 % (ref 10–50)
LYMPHOCYTES NFR BLD AUTO: 24.7 % (ref 10–50)
LYMPHOCYTES NFR BLD AUTO: 27 % (ref 10–50)
LYMPHOCYTES NFR BLD MANUAL: 17 % (ref 10–50)
LYMPHOCYTES NFR BLD MANUAL: 24 % (ref 10–50)
LYMPHOCYTES NFR BLD MANUAL: 3 % (ref 0–12)
LYMPHOCYTES NFR BLD MANUAL: 6 % (ref 0–12)
Lab: ABNORMAL
Lab: ABNORMAL
Lab: NORMAL
Lab: NORMAL
M PNEUMO IGG SER IA-ACNC: NOT DETECTED
M-SPIKE: ABNORMAL G/DL
M-SPIKE: ABNORMAL G/DL
MACROCYTES BLD QL SMEAR: ABNORMAL
MAGNESIUM SERPL-MCNC: 1.9 MG/DL (ref 1.6–2.3)
MAGNESIUM SERPL-MCNC: 2 MG/DL (ref 1.6–2.3)
MAGNESIUM SERPL-MCNC: 2.2 MG/DL (ref 1.6–2.3)
MCH RBC QN AUTO: 32.2 PG (ref 26.5–34)
MCH RBC QN AUTO: 32.2 PG (ref 26.5–34)
MCH RBC QN AUTO: 32.5 PG (ref 26.5–34)
MCH RBC QN AUTO: 32.6 PG (ref 26.5–34)
MCH RBC QN AUTO: 32.7 PG (ref 26.5–34)
MCH RBC QN AUTO: 32.8 PG (ref 26.5–34)
MCH RBC QN AUTO: 32.9 PG (ref 26.5–34)
MCH RBC QN AUTO: 32.9 PG (ref 26.5–34)
MCH RBC QN AUTO: 33 PG (ref 26.5–34)
MCH RBC QN AUTO: 33 PG (ref 26.5–34)
MCH RBC QN AUTO: 33.1 PG (ref 26.5–34)
MCH RBC QN AUTO: 33.2 PG (ref 26.5–34)
MCH RBC QN AUTO: 33.3 PG (ref 26.5–34)
MCH RBC QN AUTO: 33.4 PG (ref 26.5–34)
MCH RBC QN AUTO: 33.5 PG (ref 26.5–34)
MCHC RBC AUTO-ENTMCNC: 32.6 G/DL (ref 31.4–36)
MCHC RBC AUTO-ENTMCNC: 32.6 G/DL (ref 31.4–36)
MCHC RBC AUTO-ENTMCNC: 32.9 G/DL (ref 31.4–36)
MCHC RBC AUTO-ENTMCNC: 33 G/DL (ref 31.4–36)
MCHC RBC AUTO-ENTMCNC: 33 G/DL (ref 31.4–36)
MCHC RBC AUTO-ENTMCNC: 33.1 G/DL (ref 31.4–36)
MCHC RBC AUTO-ENTMCNC: 33.1 G/DL (ref 31.4–36)
MCHC RBC AUTO-ENTMCNC: 33.2 G/DL (ref 31.4–36)
MCHC RBC AUTO-ENTMCNC: 33.3 G/DL (ref 31.4–36)
MCHC RBC AUTO-ENTMCNC: 33.6 G/DL (ref 31.4–36)
MCHC RBC AUTO-ENTMCNC: 34 G/DL (ref 31.4–36)
MCHC RBC AUTO-ENTMCNC: 34.1 G/DL (ref 31.4–36)
MCHC RBC AUTO-ENTMCNC: 34.1 G/DL (ref 31.4–36)
MCHC RBC AUTO-ENTMCNC: 34.2 G/DL (ref 31.4–36)
MCHC RBC AUTO-ENTMCNC: 34.2 G/DL (ref 31.4–36)
MCHC RBC AUTO-ENTMCNC: 34.3 G/DL (ref 31.4–36)
MCHC RBC AUTO-ENTMCNC: 34.3 G/DL (ref 31.4–36)
MCHC RBC AUTO-ENTMCNC: 34.5 G/DL (ref 31.4–36)
MCHC RBC AUTO-ENTMCNC: 34.8 G/DL (ref 31.4–36)
MCV RBC AUTO: 100 FL (ref 80–98)
MCV RBC AUTO: 100.3 FL (ref 80–98)
MCV RBC AUTO: 100.8 FL (ref 80–98)
MCV RBC AUTO: 100.9 FL (ref 80–98)
MCV RBC AUTO: 101.7 FL (ref 80–98)
MCV RBC AUTO: 102.1 FL (ref 80–98)
MCV RBC AUTO: 94.8 FL (ref 80–98)
MCV RBC AUTO: 95 FL (ref 80–98)
MCV RBC AUTO: 95.5 FL (ref 80–98)
MCV RBC AUTO: 96 FL (ref 80–98)
MCV RBC AUTO: 96.3 FL (ref 80–98)
MCV RBC AUTO: 96.7 FL (ref 80–98)
MCV RBC AUTO: 96.9 FL (ref 80–98)
MCV RBC AUTO: 96.9 FL (ref 80–98)
MCV RBC AUTO: 97 FL (ref 80–98)
MCV RBC AUTO: 97 FL (ref 80–98)
MCV RBC AUTO: 98.3 FL (ref 80–98)
MCV RBC AUTO: 98.4 FL (ref 80–98)
MCV RBC AUTO: 99 FL (ref 80–98)
METAMYELOCYTES NFR BLD MANUAL: 7 % (ref 0–0)
MONOCYTES # BLD AUTO: 0.35 10*3/MM3 (ref 0–0.9)
MONOCYTES # BLD AUTO: 0.63 10*3/MM3 (ref 0–0.9)
MONOCYTES # BLD AUTO: 0.64 10*3/MM3 (ref 0–0.9)
MONOCYTES # BLD AUTO: 0.71 10*3/MM3 (ref 0–0.9)
MONOCYTES # BLD AUTO: 0.72 10*3/MM3 (ref 0–0.9)
MONOCYTES # BLD AUTO: 0.73 10*3/MM3 (ref 0–0.9)
MONOCYTES # BLD AUTO: 0.74 10*3/MM3 (ref 0–0.9)
MONOCYTES # BLD AUTO: 0.79 10*3/MM3 (ref 0–0.9)
MONOCYTES # BLD AUTO: 0.83 10*3/MM3 (ref 0–0.9)
MONOCYTES # BLD AUTO: 0.85 10*3/MM3 (ref 0–0.9)
MONOCYTES # BLD AUTO: 0.86 10*3/MM3 (ref 0–0.9)
MONOCYTES # BLD AUTO: 0.89 10*3/MM3 (ref 0–0.9)
MONOCYTES # BLD AUTO: 0.9 10*3/MM3 (ref 0–0.9)
MONOCYTES # BLD AUTO: 0.98 10*3/MM3 (ref 0–0.9)
MONOCYTES # BLD AUTO: 1 10*3/MM3 (ref 0–0.9)
MONOCYTES # BLD AUTO: 1.03 10*3/MM3 (ref 0–0.9)
MONOCYTES # BLD AUTO: 1.08 10*3/MM3 (ref 0–0.9)
MONOCYTES # BLD AUTO: 1.16 10*3/MM3 (ref 0–0.9)
MONOCYTES # BLD AUTO: 1.17 10*3/MM3 (ref 0–0.9)
MONOCYTES NFR BLD AUTO: 10 % (ref 0–12)
MONOCYTES NFR BLD AUTO: 6.4 % (ref 0–12)
MONOCYTES NFR BLD AUTO: 6.5 % (ref 0–12)
MONOCYTES NFR BLD AUTO: 6.7 % (ref 0–12)
MONOCYTES NFR BLD AUTO: 6.8 % (ref 0–12)
MONOCYTES NFR BLD AUTO: 6.9 % (ref 0–12)
MONOCYTES NFR BLD AUTO: 7.1 % (ref 0–12)
MONOCYTES NFR BLD AUTO: 7.1 % (ref 0–12)
MONOCYTES NFR BLD AUTO: 7.2 % (ref 0–12)
MONOCYTES NFR BLD AUTO: 7.5 % (ref 0–12)
MONOCYTES NFR BLD AUTO: 7.8 % (ref 0–12)
MONOCYTES NFR BLD AUTO: 8.3 % (ref 0–12)
MONOCYTES NFR BLD AUTO: 8.5 % (ref 0–12)
MONOCYTES NFR BLD AUTO: 8.7 % (ref 0–12)
MONOCYTES NFR BLD AUTO: 8.8 % (ref 0–12)
MYELOCYTES NFR BLD MANUAL: 5 % (ref 0–0)
NEUTROPHILS # BLD AUTO: 10.39 10*3/MM3 (ref 2–8.6)
NEUTROPHILS # BLD AUTO: 11.05 10*3/MM3 (ref 2–8.6)
NEUTROPHILS # BLD AUTO: 11.09 10*3/MM3 (ref 2–8.6)
NEUTROPHILS # BLD AUTO: 11.66 10*3/MM3 (ref 2–8.6)
NEUTROPHILS # BLD AUTO: 4.86 10*3/MM3 (ref 2–8.6)
NEUTROPHILS # BLD AUTO: 5.04 10*3/MM3 (ref 2–8.6)
NEUTROPHILS # BLD AUTO: 5.14 10*3/MM3 (ref 2–8.6)
NEUTROPHILS # BLD AUTO: 5.95 10*3/MM3 (ref 2–8.6)
NEUTROPHILS # BLD AUTO: 6.06 10*3/MM3 (ref 2–8.6)
NEUTROPHILS # BLD AUTO: 6.95 10*3/MM3 (ref 2–8.6)
NEUTROPHILS # BLD AUTO: 7.32 10*3/MM3 (ref 2–8.6)
NEUTROPHILS # BLD AUTO: 7.72 10*3/MM3 (ref 2–8.6)
NEUTROPHILS # BLD AUTO: 7.86 10*3/MM3 (ref 2–8.6)
NEUTROPHILS # BLD AUTO: 7.87 10*3/MM3 (ref 2–8.6)
NEUTROPHILS # BLD AUTO: 8.21 10*3/MM3 (ref 2–8.6)
NEUTROPHILS # BLD AUTO: 8.37 10*3/MM3 (ref 2–8.6)
NEUTROPHILS # BLD AUTO: 9.37 10*3/MM3 (ref 2–8.6)
NEUTROPHILS # BLD AUTO: 9.51 10*3/MM3 (ref 2–8.6)
NEUTROPHILS # BLD AUTO: 9.85 10*3/MM3 (ref 2–8.6)
NEUTROPHILS NFR BLD AUTO: 61.9 % (ref 37–80)
NEUTROPHILS NFR BLD AUTO: 63.7 % (ref 37–80)
NEUTROPHILS NFR BLD AUTO: 64.8 % (ref 37–80)
NEUTROPHILS NFR BLD AUTO: 65 % (ref 37–80)
NEUTROPHILS NFR BLD AUTO: 66.1 % (ref 37–80)
NEUTROPHILS NFR BLD AUTO: 67.9 % (ref 37–80)
NEUTROPHILS NFR BLD AUTO: 68.2 % (ref 37–80)
NEUTROPHILS NFR BLD AUTO: 68.6 % (ref 37–80)
NEUTROPHILS NFR BLD AUTO: 68.8 % (ref 37–80)
NEUTROPHILS NFR BLD AUTO: 70.1 % (ref 37–80)
NEUTROPHILS NFR BLD AUTO: 70.5 % (ref 37–80)
NEUTROPHILS NFR BLD AUTO: 70.5 % (ref 37–80)
NEUTROPHILS NFR BLD AUTO: 71.6 % (ref 37–80)
NEUTROPHILS NFR BLD AUTO: 73.4 % (ref 37–80)
NEUTROPHILS NFR BLD AUTO: 73.6 % (ref 37–80)
NEUTROPHILS NFR BLD AUTO: 75.6 % (ref 37–80)
NEUTROPHILS NFR BLD AUTO: 76.3 % (ref 37–80)
NEUTROPHILS NFR BLD MANUAL: 56 % (ref 37–80)
NEUTROPHILS NFR BLD MANUAL: 75 % (ref 37–80)
NEUTS BAND NFR BLD MANUAL: 3 % (ref 0–5)
NITRITE UR QL STRIP: NEGATIVE
NITRITE UR QL STRIP: NEGATIVE
NITRITE UR QL STRIP: POSITIVE
NRBC BLD MANUAL-RTO: 0 /100 WBC (ref 0–0)
NT-PROBNP SERPL-MCNC: 1250 PG/ML (ref 0–1800)
NT-PROBNP SERPL-MCNC: 3210 PG/ML (ref 0–1800)
OSMOLALITY UR: 395 MOSM/KG (ref 38–1400)
OVALOCYTES BLD QL SMEAR: ABNORMAL
PH UR STRIP.AUTO: 5.5 [PH] (ref 5–9)
PH UR STRIP.AUTO: <=5 [PH] (ref 5–9)
PH UR STRIP.AUTO: <=5 [PH] (ref 5–9)
PHOSPHATE SERPL-MCNC: 3.2 MG/DL (ref 2.4–4.4)
PHOSPHATE SERPL-MCNC: 3.9 MG/DL (ref 2.4–4.4)
PHOSPHATE SERPL-MCNC: 4.7 MG/DL (ref 2.4–4.4)
PLAT MORPH BLD: NORMAL
PLATELET # BLD AUTO: 161 10*3/MM3 (ref 150–450)
PLATELET # BLD AUTO: 168 10*3/MM3 (ref 150–450)
PLATELET # BLD AUTO: 185 10*3/MM3 (ref 150–450)
PLATELET # BLD AUTO: 190 10*3/MM3 (ref 150–450)
PLATELET # BLD AUTO: 190 10*3/MM3 (ref 150–450)
PLATELET # BLD AUTO: 191 10*3/MM3 (ref 150–450)
PLATELET # BLD AUTO: 206 10*3/MM3 (ref 150–450)
PLATELET # BLD AUTO: 206 10*3/MM3 (ref 150–450)
PLATELET # BLD AUTO: 211 10*3/MM3 (ref 150–450)
PLATELET # BLD AUTO: 217 10*3/MM3 (ref 150–450)
PLATELET # BLD AUTO: 224 10*3/MM3 (ref 150–450)
PLATELET # BLD AUTO: 233 10*3/MM3 (ref 150–450)
PLATELET # BLD AUTO: 238 10*3/MM3 (ref 150–450)
PLATELET # BLD AUTO: 239 10*3/MM3 (ref 150–450)
PLATELET # BLD AUTO: 255 10*3/MM3 (ref 150–450)
PLATELET # BLD AUTO: 259 10*3/MM3 (ref 150–450)
PLATELET # BLD AUTO: 285 10*3/MM3 (ref 150–450)
PMV BLD AUTO: 10.2 FL (ref 8–12)
PMV BLD AUTO: 10.3 FL (ref 8–12)
PMV BLD AUTO: 10.4 FL (ref 8–12)
PMV BLD AUTO: 10.6 FL (ref 8–12)
PMV BLD AUTO: 10.8 FL (ref 8–12)
PMV BLD AUTO: 10.9 FL (ref 8–12)
PMV BLD AUTO: 8.7 FL (ref 8–12)
PMV BLD AUTO: 8.8 FL (ref 8–12)
PMV BLD AUTO: 8.9 FL (ref 8–12)
PMV BLD AUTO: 9.3 FL (ref 8–12)
PMV BLD AUTO: 9.3 FL (ref 8–12)
PMV BLD AUTO: 9.4 FL (ref 8–12)
PMV BLD AUTO: 9.4 FL (ref 8–12)
PMV BLD AUTO: 9.6 FL (ref 8–12)
PMV BLD AUTO: 9.7 FL (ref 8–12)
PMV BLD AUTO: 9.7 FL (ref 8–12)
PMV BLD AUTO: 9.9 FL (ref 8–12)
POTASSIUM BLD-SCNC: 3.7 MMOL/L (ref 3.5–5.1)
POTASSIUM BLD-SCNC: 3.7 MMOL/L (ref 3.5–5.1)
POTASSIUM BLD-SCNC: 3.8 MMOL/L (ref 3.5–5.1)
POTASSIUM BLD-SCNC: 3.9 MMOL/L (ref 3.5–5.1)
POTASSIUM BLD-SCNC: 4 MMOL/L (ref 3.5–5.1)
POTASSIUM BLD-SCNC: 4.1 MMOL/L (ref 3.5–5.1)
POTASSIUM BLD-SCNC: 4.1 MMOL/L (ref 3.5–5.1)
POTASSIUM BLD-SCNC: 4.2 MMOL/L (ref 3.5–5.1)
POTASSIUM BLD-SCNC: 4.3 MMOL/L (ref 3.5–5.1)
POTASSIUM BLD-SCNC: 4.4 MMOL/L (ref 3.5–5.1)
POTASSIUM BLD-SCNC: 4.4 MMOL/L (ref 3.5–5.1)
POTASSIUM BLD-SCNC: 4.6 MMOL/L (ref 3.5–5.1)
POTASSIUM BLD-SCNC: 4.9 MMOL/L (ref 3.5–5.1)
POTASSIUM BLD-SCNC: 4.9 MMOL/L (ref 3.5–5.1)
POTASSIUM BLD-SCNC: 5 MMOL/L (ref 3.5–5.1)
POTASSIUM BLD-SCNC: 5.3 MMOL/L (ref 3.5–5.1)
POTASSIUM BLD-SCNC: 5.4 MMOL/L (ref 3.5–5.1)
PREALB SERPL-MCNC: 24.9 MG/DL (ref 17.6–36)
PROT PATTERN SERPL ELPH-IMP: ABNORMAL
PROT SERPL-MCNC: 4.9 G/DL (ref 6–8.5)
PROT SERPL-MCNC: 5.1 G/DL (ref 6.3–8.6)
PROT SERPL-MCNC: 5.5 G/DL (ref 6–8.5)
PROT SERPL-MCNC: 5.8 G/DL (ref 6.3–8.6)
PROT SERPL-MCNC: 5.8 G/DL (ref 6.3–8.6)
PROT SERPL-MCNC: 6.6 G/DL (ref 6.3–8.6)
PROT SERPL-MCNC: 6.9 G/DL (ref 6.3–8.6)
PROT SERPL-MCNC: 7 G/DL (ref 6.3–8.6)
PROT SERPL-MCNC: 7 G/DL (ref 6.3–8.6)
PROT SERPL-MCNC: 8.1 G/DL (ref 6.3–8.6)
PROT UR QL STRIP: NEGATIVE
PROTHROMBIN TIME: 13 SECONDS (ref 11.1–15.3)
PROTHROMBIN TIME: 15.7 SECONDS (ref 11.1–15.3)
PTH-INTACT SERPL-MCNC: 228 PG/ML (ref 10–65)
RBC # BLD AUTO: 2.86 10*6/MM3 (ref 3.77–5.16)
RBC # BLD AUTO: 3.04 10*6/MM3 (ref 3.77–5.16)
RBC # BLD AUTO: 3.21 10*6/MM3 (ref 3.77–5.16)
RBC # BLD AUTO: 3.21 10*6/MM3 (ref 3.77–5.16)
RBC # BLD AUTO: 3.22 10*6/MM3 (ref 3.77–5.16)
RBC # BLD AUTO: 3.22 10*6/MM3 (ref 3.77–5.16)
RBC # BLD AUTO: 3.31 10*6/MM3 (ref 3.77–5.16)
RBC # BLD AUTO: 3.34 10*6/MM3 (ref 3.77–5.16)
RBC # BLD AUTO: 3.47 10*6/MM3 (ref 3.77–5.16)
RBC # BLD AUTO: 3.51 10*6/MM3 (ref 3.77–5.16)
RBC # BLD AUTO: 3.53 10*6/MM3 (ref 3.77–5.16)
RBC # BLD AUTO: 3.63 10*6/MM3 (ref 3.77–5.16)
RBC # BLD AUTO: 3.63 10*6/MM3 (ref 3.77–5.16)
RBC # BLD AUTO: 3.64 10*6/MM3 (ref 3.77–5.16)
RBC # BLD AUTO: 3.76 10*6/MM3 (ref 3.77–5.16)
RBC # BLD AUTO: 3.79 10*6/MM3 (ref 3.77–5.16)
RBC # BLD AUTO: 3.91 10*6/MM3 (ref 3.77–5.16)
RBC # BLD AUTO: 4.01 10*6/MM3 (ref 3.77–5.16)
RBC # BLD AUTO: 4.09 10*6/MM3 (ref 3.77–5.16)
RBC # UR: ABNORMAL /HPF
RBC MORPH BLD: NORMAL
RBC MORPH BLD: NORMAL
REF LAB TEST METHOD: ABNORMAL
RHINOVIRUS RNA SPEC NAA+PROBE: NOT DETECTED
RSV RNA NPH QL NAA+NON-PROBE: NOT DETECTED
RV AG STL QL IA: NEGATIVE
SALM + SHIG STL CULT: NORMAL
SMUDGE CELLS IN BLOOD BY LIGHT MICROSCOPY: 2 /100 WBC
SODIUM BLD-SCNC: 133 MMOL/L (ref 137–145)
SODIUM BLD-SCNC: 135 MMOL/L (ref 137–145)
SODIUM BLD-SCNC: 136 MMOL/L (ref 137–145)
SODIUM BLD-SCNC: 137 MMOL/L (ref 137–145)
SODIUM BLD-SCNC: 139 MMOL/L (ref 137–145)
SODIUM BLD-SCNC: 141 MMOL/L (ref 137–145)
SODIUM BLD-SCNC: 142 MMOL/L (ref 137–145)
SODIUM UR-SCNC: 59 MMOL/L (ref 30–90)
SP GR UR STRIP: 1.01 (ref 1–1.03)
SP GR UR STRIP: 1.02 (ref 1–1.03)
SP GR UR STRIP: 1.03 (ref 1–1.03)
SQUAMOUS #/AREA URNS HPF: ABNORMAL /HPF
T4 FREE SERPL-MCNC: 2.18 NG/DL (ref 0.78–2.19)
TOXIC GRANULATION: ABNORMAL
TROPONIN I SERPL-MCNC: 0.02 NG/ML
TROPONIN I SERPL-MCNC: 0.02 NG/ML
TSH SERPL DL<=0.05 MIU/L-ACNC: 0.03 MIU/ML (ref 0.46–4.68)
TSH SERPL DL<=0.05 MIU/L-ACNC: 0.85 MIU/ML (ref 0.46–4.68)
UROBILINOGEN UR QL STRIP: ABNORMAL
UROBILINOGEN UR QL STRIP: ABNORMAL
UROBILINOGEN UR QL STRIP: NORMAL
VIT B12 BLD-MCNC: 292 PG/ML (ref 239–931)
WBC MORPH BLD: NORMAL
WBC NRBC COR # BLD: 10.5 10*3/MM3 (ref 3.2–9.8)
WBC NRBC COR # BLD: 10.65 10*3/MM3 (ref 3.2–9.8)
WBC NRBC COR # BLD: 11.16 10*3/MM3 (ref 3.2–9.8)
WBC NRBC COR # BLD: 11.65 10*3/MM3 (ref 3.2–9.8)
WBC NRBC COR # BLD: 11.78 10*3/MM3 (ref 3.2–9.8)
WBC NRBC COR # BLD: 12.14 10*3/MM3 (ref 3.2–9.8)
WBC NRBC COR # BLD: 12.66 10*3/MM3 (ref 3.2–9.8)
WBC NRBC COR # BLD: 12.73 10*3/MM3 (ref 3.2–9.8)
WBC NRBC COR # BLD: 13.03 10*3/MM3 (ref 3.2–9.8)
WBC NRBC COR # BLD: 13.28 10*3/MM3 (ref 3.2–9.8)
WBC NRBC COR # BLD: 14.78 10*3/MM3 (ref 3.2–9.8)
WBC NRBC COR # BLD: 14.83 10*3/MM3 (ref 3.2–9.8)
WBC NRBC COR # BLD: 15.29 10*3/MM3 (ref 3.2–9.8)
WBC NRBC COR # BLD: 16.11 10*3/MM3 (ref 3.2–9.8)
WBC NRBC COR # BLD: 7.62 10*3/MM3 (ref 3.2–9.8)
WBC NRBC COR # BLD: 7.74 10*3/MM3 (ref 3.2–9.8)
WBC NRBC COR # BLD: 8.3 10*3/MM3 (ref 3.2–9.8)
WBC NRBC COR # BLD: 8.73 10*3/MM3 (ref 3.2–9.8)
WBC NRBC COR # BLD: 8.94 10*3/MM3 (ref 3.2–9.8)
WBC UR QL AUTO: ABNORMAL /HPF
WHOLE BLOOD HOLD SPECIMEN: NORMAL

## 2018-01-01 PROCEDURE — 80048 BASIC METABOLIC PNL TOTAL CA: CPT | Performed by: FAMILY MEDICINE

## 2018-01-01 PROCEDURE — 96361 HYDRATE IV INFUSION ADD-ON: CPT

## 2018-01-01 PROCEDURE — 93010 ELECTROCARDIOGRAM REPORT: CPT | Performed by: INTERNAL MEDICINE

## 2018-01-01 PROCEDURE — 80053 COMPREHEN METABOLIC PANEL: CPT | Performed by: EMERGENCY MEDICINE

## 2018-01-01 PROCEDURE — 93005 ELECTROCARDIOGRAM TRACING: CPT | Performed by: FAMILY MEDICINE

## 2018-01-01 PROCEDURE — 81003 URINALYSIS AUTO W/O SCOPE: CPT | Performed by: FAMILY MEDICINE

## 2018-01-01 PROCEDURE — 85025 COMPLETE CBC W/AUTO DIFF WBC: CPT | Performed by: HOSPITALIST

## 2018-01-01 PROCEDURE — 99213 OFFICE O/P EST LOW 20 MIN: CPT | Performed by: FAMILY MEDICINE

## 2018-01-01 PROCEDURE — 76775 US EXAM ABDO BACK WALL LIM: CPT

## 2018-01-01 PROCEDURE — 80053 COMPREHEN METABOLIC PANEL: CPT | Performed by: FAMILY MEDICINE

## 2018-01-01 PROCEDURE — 87046 STOOL CULTR AEROBIC BACT EA: CPT | Performed by: NURSE PRACTITIONER

## 2018-01-01 PROCEDURE — 96372 THER/PROPH/DIAG INJ SC/IM: CPT | Performed by: NURSE PRACTITIONER

## 2018-01-01 PROCEDURE — 85610 PROTHROMBIN TIME: CPT | Performed by: EMERGENCY MEDICINE

## 2018-01-01 PROCEDURE — 96372 THER/PROPH/DIAG INJ SC/IM: CPT | Performed by: FAMILY MEDICINE

## 2018-01-01 PROCEDURE — 80053 COMPREHEN METABOLIC PANEL: CPT | Performed by: NURSE PRACTITIONER

## 2018-01-01 PROCEDURE — 25010000002 LEVOFLOXACIN PER 250 MG: Performed by: NURSE PRACTITIONER

## 2018-01-01 PROCEDURE — 84484 ASSAY OF TROPONIN QUANT: CPT | Performed by: EMERGENCY MEDICINE

## 2018-01-01 PROCEDURE — 82310 ASSAY OF CALCIUM: CPT

## 2018-01-01 PROCEDURE — 97162 PT EVAL MOD COMPLEX 30 MIN: CPT

## 2018-01-01 PROCEDURE — G0378 HOSPITAL OBSERVATION PER HR: HCPCS

## 2018-01-01 PROCEDURE — 85025 COMPLETE CBC W/AUTO DIFF WBC: CPT | Performed by: FAMILY MEDICINE

## 2018-01-01 PROCEDURE — 63710000001 DRONABINOL PER 2.5 MG: Performed by: HOSPITALIST

## 2018-01-01 PROCEDURE — 97530 THERAPEUTIC ACTIVITIES: CPT

## 2018-01-01 PROCEDURE — 87045 FECES CULTURE AEROBIC BACT: CPT | Performed by: NURSE PRACTITIONER

## 2018-01-01 PROCEDURE — 99283 EMERGENCY DEPT VISIT LOW MDM: CPT

## 2018-01-01 PROCEDURE — G0180 MD CERTIFICATION HHA PATIENT: HCPCS | Performed by: FAMILY MEDICINE

## 2018-01-01 PROCEDURE — 86334 IMMUNOFIX E-PHORESIS SERUM: CPT | Performed by: INTERNAL MEDICINE

## 2018-01-01 PROCEDURE — 36415 COLL VENOUS BLD VENIPUNCTURE: CPT | Performed by: FAMILY MEDICINE

## 2018-01-01 PROCEDURE — 99214 OFFICE O/P EST MOD 30 MIN: CPT | Performed by: FAMILY MEDICINE

## 2018-01-01 PROCEDURE — 97166 OT EVAL MOD COMPLEX 45 MIN: CPT

## 2018-01-01 PROCEDURE — 85025 COMPLETE CBC W/AUTO DIFF WBC: CPT | Performed by: NURSE PRACTITIONER

## 2018-01-01 PROCEDURE — 99204 OFFICE O/P NEW MOD 45 MIN: CPT | Performed by: NURSE PRACTITIONER

## 2018-01-01 PROCEDURE — 84439 ASSAY OF FREE THYROXINE: CPT | Performed by: FAMILY MEDICINE

## 2018-01-01 PROCEDURE — 82306 VITAMIN D 25 HYDROXY: CPT | Performed by: INTERNAL MEDICINE

## 2018-01-01 PROCEDURE — 96372 THER/PROPH/DIAG INJ SC/IM: CPT

## 2018-01-01 PROCEDURE — 83735 ASSAY OF MAGNESIUM: CPT | Performed by: EMERGENCY MEDICINE

## 2018-01-01 PROCEDURE — 97110 THERAPEUTIC EXERCISES: CPT

## 2018-01-01 PROCEDURE — 71045 X-RAY EXAM CHEST 1 VIEW: CPT

## 2018-01-01 PROCEDURE — G0463 HOSPITAL OUTPT CLINIC VISIT: HCPCS

## 2018-01-01 PROCEDURE — C1751 CATH, INF, PER/CENT/MIDLINE: HCPCS

## 2018-01-01 PROCEDURE — 97116 GAIT TRAINING THERAPY: CPT

## 2018-01-01 PROCEDURE — 25010000002 LEVOFLOXACIN PER 250 MG: Performed by: FAMILY MEDICINE

## 2018-01-01 PROCEDURE — 93005 ELECTROCARDIOGRAM TRACING: CPT | Performed by: EMERGENCY MEDICINE

## 2018-01-01 PROCEDURE — 80069 RENAL FUNCTION PANEL: CPT | Performed by: FAMILY MEDICINE

## 2018-01-01 PROCEDURE — 82784 ASSAY IGA/IGD/IGG/IGM EACH: CPT | Performed by: INTERNAL MEDICINE

## 2018-01-01 PROCEDURE — 36415 COLL VENOUS BLD VENIPUNCTURE: CPT | Performed by: EMERGENCY MEDICINE

## 2018-01-01 PROCEDURE — 85379 FIBRIN DEGRADATION QUANT: CPT | Performed by: EMERGENCY MEDICINE

## 2018-01-01 PROCEDURE — 36415 COLL VENOUS BLD VENIPUNCTURE: CPT

## 2018-01-01 PROCEDURE — 36415 COLL VENOUS BLD VENIPUNCTURE: CPT | Performed by: NURSE PRACTITIONER

## 2018-01-01 PROCEDURE — 85007 BL SMEAR W/DIFF WBC COUNT: CPT | Performed by: FAMILY MEDICINE

## 2018-01-01 PROCEDURE — 83970 ASSAY OF PARATHORMONE: CPT | Performed by: INTERNAL MEDICINE

## 2018-01-01 PROCEDURE — 84134 ASSAY OF PREALBUMIN: CPT | Performed by: NURSE PRACTITIONER

## 2018-01-01 PROCEDURE — 84165 PROTEIN E-PHORESIS SERUM: CPT | Performed by: INTERNAL MEDICINE

## 2018-01-01 PROCEDURE — 83631 LACTOFERRIN FECAL (QUANT): CPT | Performed by: NURSE PRACTITIONER

## 2018-01-01 PROCEDURE — G8978 MOBILITY CURRENT STATUS: HCPCS

## 2018-01-01 PROCEDURE — G0438 PPPS, INITIAL VISIT: HCPCS | Performed by: FAMILY MEDICINE

## 2018-01-01 PROCEDURE — 90471 IMMUNIZATION ADMIN: CPT | Performed by: FAMILY MEDICINE

## 2018-01-01 PROCEDURE — 83880 ASSAY OF NATRIURETIC PEPTIDE: CPT | Performed by: EMERGENCY MEDICINE

## 2018-01-01 PROCEDURE — 80048 BASIC METABOLIC PNL TOTAL CA: CPT | Performed by: HOSPITALIST

## 2018-01-01 PROCEDURE — 84100 ASSAY OF PHOSPHORUS: CPT | Performed by: INTERNAL MEDICINE

## 2018-01-01 PROCEDURE — G8979 MOBILITY GOAL STATUS: HCPCS

## 2018-01-01 PROCEDURE — 97535 SELF CARE MNGMENT TRAINING: CPT

## 2018-01-01 PROCEDURE — 74022 RADEX COMPL AQT ABD SERIES: CPT

## 2018-01-01 PROCEDURE — 84300 ASSAY OF URINE SODIUM: CPT | Performed by: FAMILY MEDICINE

## 2018-01-01 PROCEDURE — 84100 ASSAY OF PHOSPHORUS: CPT | Performed by: EMERGENCY MEDICINE

## 2018-01-01 PROCEDURE — 87077 CULTURE AEROBIC IDENTIFY: CPT | Performed by: NURSE PRACTITIONER

## 2018-01-01 PROCEDURE — 87581 M.PNEUMON DNA AMP PROBE: CPT | Performed by: FAMILY MEDICINE

## 2018-01-01 PROCEDURE — 83735 ASSAY OF MAGNESIUM: CPT | Performed by: FAMILY MEDICINE

## 2018-01-01 PROCEDURE — 76937 US GUIDE VASCULAR ACCESS: CPT

## 2018-01-01 PROCEDURE — 97542 WHEELCHAIR MNGMENT TRAINING: CPT

## 2018-01-01 PROCEDURE — 82330 ASSAY OF CALCIUM: CPT | Performed by: INTERNAL MEDICINE

## 2018-01-01 PROCEDURE — 84443 ASSAY THYROID STIM HORMONE: CPT | Performed by: FAMILY MEDICINE

## 2018-01-01 PROCEDURE — 84155 ASSAY OF PROTEIN SERUM: CPT | Performed by: INTERNAL MEDICINE

## 2018-01-01 PROCEDURE — 25010000002 ENOXAPARIN PER 10 MG: Performed by: HOSPITALIST

## 2018-01-01 PROCEDURE — 87798 DETECT AGENT NOS DNA AMP: CPT | Performed by: FAMILY MEDICINE

## 2018-01-01 PROCEDURE — 87633 RESP VIRUS 12-25 TARGETS: CPT | Performed by: FAMILY MEDICINE

## 2018-01-01 PROCEDURE — 83690 ASSAY OF LIPASE: CPT | Performed by: EMERGENCY MEDICINE

## 2018-01-01 PROCEDURE — 25010000002 CALCIUM GLUCONATE PER 10 ML: Performed by: EMERGENCY MEDICINE

## 2018-01-01 PROCEDURE — 25010000002 CALCIUM GLUCONATE PER 10 ML: Performed by: INTERNAL MEDICINE

## 2018-01-01 PROCEDURE — 80048 BASIC METABOLIC PNL TOTAL CA: CPT

## 2018-01-01 PROCEDURE — 87086 URINE CULTURE/COLONY COUNT: CPT | Performed by: NURSE PRACTITIONER

## 2018-01-01 PROCEDURE — G8988 SELF CARE GOAL STATUS: HCPCS

## 2018-01-01 PROCEDURE — 71046 X-RAY EXAM CHEST 2 VIEWS: CPT

## 2018-01-01 PROCEDURE — 85007 BL SMEAR W/DIFF WBC COUNT: CPT | Performed by: NURSE PRACTITIONER

## 2018-01-01 PROCEDURE — 93005 ELECTROCARDIOGRAM TRACING: CPT | Performed by: NURSE PRACTITIONER

## 2018-01-01 PROCEDURE — 85025 COMPLETE CBC W/AUTO DIFF WBC: CPT | Performed by: EMERGENCY MEDICINE

## 2018-01-01 PROCEDURE — 96365 THER/PROPH/DIAG IV INF INIT: CPT

## 2018-01-01 PROCEDURE — G8987 SELF CARE CURRENT STATUS: HCPCS

## 2018-01-01 PROCEDURE — 90715 TDAP VACCINE 7 YRS/> IM: CPT | Performed by: FAMILY MEDICINE

## 2018-01-01 PROCEDURE — 81003 URINALYSIS AUTO W/O SCOPE: CPT | Performed by: EMERGENCY MEDICINE

## 2018-01-01 PROCEDURE — 99214 OFFICE O/P EST MOD 30 MIN: CPT | Performed by: INTERNAL MEDICINE

## 2018-01-01 PROCEDURE — 93971 EXTREMITY STUDY: CPT

## 2018-01-01 PROCEDURE — 82607 VITAMIN B-12: CPT | Performed by: FAMILY MEDICINE

## 2018-01-01 PROCEDURE — 87186 SC STD MICRODIL/AGAR DIL: CPT | Performed by: NURSE PRACTITIONER

## 2018-01-01 PROCEDURE — 99284 EMERGENCY DEPT VISIT MOD MDM: CPT

## 2018-01-01 PROCEDURE — 87486 CHLMYD PNEUM DNA AMP PROBE: CPT | Performed by: FAMILY MEDICINE

## 2018-01-01 PROCEDURE — 82272 OCCULT BLD FECES 1-3 TESTS: CPT | Performed by: NURSE PRACTITIONER

## 2018-01-01 PROCEDURE — 99285 EMERGENCY DEPT VISIT HI MDM: CPT

## 2018-01-01 PROCEDURE — 87040 BLOOD CULTURE FOR BACTERIA: CPT | Performed by: FAMILY MEDICINE

## 2018-01-01 PROCEDURE — 87425 ROTAVIRUS AG IA: CPT | Performed by: NURSE PRACTITIONER

## 2018-01-01 PROCEDURE — 81001 URINALYSIS AUTO W/SCOPE: CPT | Performed by: NURSE PRACTITIONER

## 2018-01-01 PROCEDURE — 85730 THROMBOPLASTIN TIME PARTIAL: CPT | Performed by: EMERGENCY MEDICINE

## 2018-01-01 PROCEDURE — 83935 ASSAY OF URINE OSMOLALITY: CPT | Performed by: FAMILY MEDICINE

## 2018-01-01 PROCEDURE — 25010000002 TDAP 5-2.5-18.5 LF-MCG/0.5 SUSPENSION: Performed by: FAMILY MEDICINE

## 2018-01-01 PROCEDURE — 25010000002 DENOSUMAB 60 MG/ML SOLUTION: Performed by: NURSE PRACTITIONER

## 2018-01-01 PROCEDURE — 25010000002 MAGNESIUM SULFATE IN D5W 1G/100ML (PREMIX) 1-5 GM/100ML-% SOLUTION: Performed by: FAMILY MEDICINE

## 2018-01-01 PROCEDURE — 82330 ASSAY OF CALCIUM: CPT | Performed by: EMERGENCY MEDICINE

## 2018-01-01 PROCEDURE — 93005 ELECTROCARDIOGRAM TRACING: CPT

## 2018-01-01 RX ORDER — ROPINIROLE 0.5 MG/1
TABLET, FILM COATED ORAL
Qty: 30 TABLET | Refills: 1 | Status: SHIPPED | OUTPATIENT
Start: 2018-01-01

## 2018-01-01 RX ORDER — FAMOTIDINE 40 MG/1
40 TABLET, FILM COATED ORAL DAILY
Status: DISCONTINUED | OUTPATIENT
Start: 2018-01-01 | End: 2018-01-01 | Stop reason: HOSPADM

## 2018-01-01 RX ORDER — ONDANSETRON 4 MG/1
TABLET, FILM COATED ORAL
Qty: 20 TABLET | Refills: 4 | Status: SHIPPED | OUTPATIENT
Start: 2018-01-01 | End: 2018-01-01 | Stop reason: SDUPTHER

## 2018-01-01 RX ORDER — LATANOPROST 50 UG/ML
1 SOLUTION/ DROPS OPHTHALMIC NIGHTLY
Status: DISCONTINUED | OUTPATIENT
Start: 2018-01-01 | End: 2018-01-01 | Stop reason: HOSPADM

## 2018-01-01 RX ORDER — MEGESTROL ACETATE 40 MG/ML
400 SUSPENSION ORAL 2 TIMES DAILY
COMMUNITY
End: 2018-01-01 | Stop reason: HOSPADM

## 2018-01-01 RX ORDER — ONDANSETRON 2 MG/ML
4 INJECTION INTRAMUSCULAR; INTRAVENOUS EVERY 6 HOURS PRN
Status: DISCONTINUED | OUTPATIENT
Start: 2018-01-01 | End: 2018-01-01 | Stop reason: HOSPADM

## 2018-01-01 RX ORDER — SODIUM CHLORIDE 0.9 % (FLUSH) 0.9 %
10 SYRINGE (ML) INJECTION AS NEEDED
Status: DISCONTINUED | OUTPATIENT
Start: 2018-01-01 | End: 2018-01-01 | Stop reason: HOSPADM

## 2018-01-01 RX ORDER — CALCIUM CARBONATE 200(500)MG
1 TABLET,CHEWABLE ORAL 3 TIMES DAILY
Status: DISCONTINUED | OUTPATIENT
Start: 2018-01-01 | End: 2018-01-01

## 2018-01-01 RX ORDER — GRANULES FOR ORAL 3 G/1
3 POWDER ORAL ONCE
Status: COMPLETED | OUTPATIENT
Start: 2018-01-01 | End: 2018-01-01

## 2018-01-01 RX ORDER — DRONABINOL 2.5 MG/1
2.5 CAPSULE ORAL
Status: DISCONTINUED | OUTPATIENT
Start: 2018-01-01 | End: 2018-01-01 | Stop reason: HOSPADM

## 2018-01-01 RX ORDER — ONDANSETRON 4 MG/1
TABLET, FILM COATED ORAL
Qty: 20 TABLET | Refills: 3 | Status: SHIPPED | OUTPATIENT
Start: 2018-01-01 | End: 2018-01-01

## 2018-01-01 RX ORDER — MEGESTROL ACETATE 40 MG/ML
400 SUSPENSION ORAL 2 TIMES DAILY
Status: DISCONTINUED | OUTPATIENT
Start: 2018-01-01 | End: 2018-01-01 | Stop reason: HOSPADM

## 2018-01-01 RX ORDER — CARVEDILOL 3.12 MG/1
3.12 TABLET ORAL 2 TIMES DAILY WITH MEALS
Status: DISCONTINUED | OUTPATIENT
Start: 2018-01-01 | End: 2018-01-01 | Stop reason: HOSPADM

## 2018-01-01 RX ORDER — HYDROCODONE BITARTRATE AND ACETAMINOPHEN 5; 325 MG/1; MG/1
1 TABLET ORAL ONCE
Status: COMPLETED | OUTPATIENT
Start: 2018-01-01 | End: 2018-01-01

## 2018-01-01 RX ORDER — LISINOPRIL 10 MG/1
TABLET ORAL
Qty: 30 TABLET | Refills: 1 | Status: SHIPPED | OUTPATIENT
Start: 2018-01-01 | End: 2018-01-01 | Stop reason: SDUPTHER

## 2018-01-01 RX ORDER — MEGESTROL ACETATE 40 MG/ML
400 SUSPENSION ORAL 2 TIMES DAILY
Qty: 480 MG | Refills: 11 | Status: SHIPPED | OUTPATIENT
Start: 2018-01-01 | End: 2018-01-01

## 2018-01-01 RX ORDER — MEGESTROL ACETATE 40 MG/ML
400 SUSPENSION ORAL 2 TIMES DAILY
Status: DISCONTINUED | OUTPATIENT
Start: 2018-01-01 | End: 2018-01-01

## 2018-01-01 RX ORDER — FUROSEMIDE 20 MG/1
20 TABLET ORAL DAILY
Status: DISCONTINUED | OUTPATIENT
Start: 2018-01-01 | End: 2018-01-01 | Stop reason: HOSPADM

## 2018-01-01 RX ORDER — TRIAMCINOLONE ACETONIDE 40 MG/ML
80 INJECTION, SUSPENSION INTRA-ARTICULAR; INTRAMUSCULAR ONCE
Status: COMPLETED | OUTPATIENT
Start: 2018-01-01 | End: 2018-01-01

## 2018-01-01 RX ORDER — SODIUM CHLORIDE 9 MG/ML
100 INJECTION, SOLUTION INTRAVENOUS CONTINUOUS
Status: DISPENSED | OUTPATIENT
Start: 2018-01-01 | End: 2018-01-01

## 2018-01-01 RX ORDER — ONDANSETRON 4 MG/1
4 TABLET, FILM COATED ORAL EVERY 8 HOURS PRN
Qty: 20 TABLET | Refills: 4 | Status: SHIPPED | OUTPATIENT
Start: 2018-01-01 | End: 2018-01-01 | Stop reason: SDUPTHER

## 2018-01-01 RX ORDER — LISINOPRIL 10 MG/1
TABLET ORAL
Qty: 30 TABLET | Refills: 3 | Status: SHIPPED | OUTPATIENT
Start: 2018-01-01 | End: 2018-01-01

## 2018-01-01 RX ORDER — CALCIUM CARBONATE 200(500)MG
1 TABLET,CHEWABLE ORAL 2 TIMES DAILY
Status: DISCONTINUED | OUTPATIENT
Start: 2018-01-01 | End: 2018-01-01 | Stop reason: HOSPADM

## 2018-01-01 RX ORDER — ROPINIROLE 0.5 MG/1
TABLET, FILM COATED ORAL
Qty: 30 TABLET | Refills: 2 | Status: SHIPPED | OUTPATIENT
Start: 2018-01-01 | End: 2018-01-01 | Stop reason: SDUPTHER

## 2018-01-01 RX ORDER — CARVEDILOL 3.12 MG/1
3.12 TABLET ORAL 2 TIMES DAILY WITH MEALS
Qty: 60 TABLET | Refills: 11 | Status: SHIPPED | OUTPATIENT
Start: 2018-01-01

## 2018-01-01 RX ORDER — FUROSEMIDE 20 MG/1
20 TABLET ORAL DAILY
Qty: 60 TABLET | Refills: 10 | Status: SHIPPED | OUTPATIENT
Start: 2018-01-01 | End: 2018-01-01 | Stop reason: SINTOL

## 2018-01-01 RX ORDER — HYDROCODONE BITARTRATE AND ACETAMINOPHEN 5; 325 MG/1; MG/1
1 TABLET ORAL EVERY 8 HOURS PRN
Qty: 90 TABLET | Refills: 0 | Status: SHIPPED | OUTPATIENT
Start: 2018-01-01 | End: 2018-01-01 | Stop reason: SDUPTHER

## 2018-01-01 RX ORDER — CARVEDILOL 6.25 MG/1
6.25 TABLET ORAL 2 TIMES DAILY WITH MEALS
Qty: 60 TABLET | Refills: 5 | Status: SHIPPED | OUTPATIENT
Start: 2018-01-01 | End: 2018-01-01

## 2018-01-01 RX ORDER — ACETAMINOPHEN 325 MG/1
650 TABLET ORAL EVERY 4 HOURS PRN
Status: DISCONTINUED | OUTPATIENT
Start: 2018-01-01 | End: 2018-01-01 | Stop reason: HOSPADM

## 2018-01-01 RX ORDER — LEVOTHYROXINE SODIUM 112 UG/1
TABLET ORAL
Qty: 30 TABLET | Refills: 6 | Status: SHIPPED | OUTPATIENT
Start: 2018-01-01

## 2018-01-01 RX ORDER — MAGNESIUM SULFATE 1 G/100ML
1 INJECTION INTRAVENOUS ONCE
Status: COMPLETED | OUTPATIENT
Start: 2018-01-01 | End: 2018-01-01

## 2018-01-01 RX ORDER — CYCLOBENZAPRINE HCL 5 MG
5 TABLET ORAL NIGHTLY
Qty: 30 TABLET | Refills: 2 | Status: SHIPPED | OUTPATIENT
Start: 2018-01-01 | End: 2018-01-01

## 2018-01-01 RX ORDER — SODIUM CHLORIDE 0.9 % (FLUSH) 0.9 %
3 SYRINGE (ML) INJECTION EVERY 12 HOURS SCHEDULED
Status: DISCONTINUED | OUTPATIENT
Start: 2018-01-01 | End: 2018-01-01 | Stop reason: HOSPADM

## 2018-01-01 RX ORDER — MIRTAZAPINE 15 MG/1
15 TABLET, FILM COATED ORAL NIGHTLY
Status: DISCONTINUED | OUTPATIENT
Start: 2018-01-01 | End: 2018-01-01 | Stop reason: HOSPADM

## 2018-01-01 RX ORDER — MEGESTROL ACETATE 40 MG/ML
SUSPENSION ORAL
Refills: 10 | OUTPATIENT
Start: 2018-01-01

## 2018-01-01 RX ORDER — ACETAMINOPHEN 325 MG/1
650 TABLET ORAL EVERY 6 HOURS PRN
Status: DISCONTINUED | OUTPATIENT
Start: 2018-01-01 | End: 2018-01-01 | Stop reason: HOSPADM

## 2018-01-01 RX ORDER — ONDANSETRON 4 MG/1
4 TABLET, FILM COATED ORAL EVERY 4 HOURS PRN
COMMUNITY
Start: 2017-01-01 | End: 2018-01-01 | Stop reason: SDUPTHER

## 2018-01-01 RX ORDER — CALCIUM GLUCONATE 94 MG/ML
1 INJECTION, SOLUTION INTRAVENOUS ONCE
Status: COMPLETED | OUTPATIENT
Start: 2018-01-01 | End: 2018-01-01

## 2018-01-01 RX ORDER — LEVOTHYROXINE SODIUM 112 UG/1
112 TABLET ORAL
Status: DISCONTINUED | OUTPATIENT
Start: 2018-01-01 | End: 2018-01-01 | Stop reason: HOSPADM

## 2018-01-01 RX ORDER — DIPHENOXYLATE HYDROCHLORIDE AND ATROPINE SULFATE 2.5; .025 MG/1; MG/1
1 TABLET ORAL 4 TIMES DAILY PRN
Qty: 10 TABLET | Refills: 1 | Status: SHIPPED | OUTPATIENT
Start: 2018-01-01 | End: 2018-01-01 | Stop reason: SDUPTHER

## 2018-01-01 RX ORDER — DRONABINOL 2.5 MG/1
2.5 CAPSULE ORAL
Qty: 30 CAPSULE | Refills: 2 | Status: SHIPPED | OUTPATIENT
Start: 2018-01-01 | End: 2018-01-01

## 2018-01-01 RX ORDER — CARVEDILOL 12.5 MG/1
12.5 TABLET ORAL 2 TIMES DAILY WITH MEALS
Status: DISCONTINUED | OUTPATIENT
Start: 2018-01-01 | End: 2018-01-01 | Stop reason: HOSPADM

## 2018-01-01 RX ORDER — METHOCARBAMOL 500 MG/1
500 TABLET, FILM COATED ORAL 3 TIMES DAILY PRN
Qty: 30 TABLET | Refills: 1 | Status: SHIPPED | OUTPATIENT
Start: 2018-01-01 | End: 2018-01-01

## 2018-01-01 RX ORDER — BRIMONIDINE TARTRATE 0.1 %
DROPS OPHTHALMIC (EYE)
Qty: 5 ML | Refills: 1 | Status: SHIPPED | OUTPATIENT
Start: 2018-01-01

## 2018-01-01 RX ORDER — NYSTATIN 100000 U/G
CREAM TOPICAL EVERY 12 HOURS SCHEDULED
Status: DISCONTINUED | OUTPATIENT
Start: 2018-01-01 | End: 2018-01-01 | Stop reason: HOSPADM

## 2018-01-01 RX ORDER — LEVOFLOXACIN 5 MG/ML
250 INJECTION, SOLUTION INTRAVENOUS EVERY 24 HOURS
Status: DISCONTINUED | OUTPATIENT
Start: 2018-01-01 | End: 2018-01-01

## 2018-01-01 RX ORDER — BRIMONIDINE TARTRATE 0.1 %
DROPS OPHTHALMIC (EYE)
Qty: 5 ML | Refills: 1 | Status: SHIPPED | OUTPATIENT
Start: 2018-01-01 | End: 2018-01-01 | Stop reason: SDUPTHER

## 2018-01-01 RX ORDER — HYDROCODONE BITARTRATE AND ACETAMINOPHEN 5; 325 MG/1; MG/1
1 TABLET ORAL EVERY 8 HOURS PRN
Qty: 90 TABLET | Refills: 0 | Status: ON HOLD | OUTPATIENT
Start: 2018-01-01 | End: 2018-01-01

## 2018-01-01 RX ORDER — ASPIRIN 81 MG/1
81 TABLET ORAL DAILY
Status: DISCONTINUED | OUTPATIENT
Start: 2018-01-01 | End: 2018-01-01 | Stop reason: HOSPADM

## 2018-01-01 RX ORDER — MELATONIN
5000 DAILY
Status: DISCONTINUED | OUTPATIENT
Start: 2018-01-01 | End: 2018-01-01 | Stop reason: HOSPADM

## 2018-01-01 RX ORDER — DIAPER,BRIEF,INFANT-TODD,DISP
EACH MISCELLANEOUS ONCE
Status: DISCONTINUED | OUTPATIENT
Start: 2018-01-01 | End: 2018-01-01

## 2018-01-01 RX ORDER — ONDANSETRON 4 MG/1
4 TABLET, ORALLY DISINTEGRATING ORAL ONCE
Status: COMPLETED | OUTPATIENT
Start: 2018-01-01 | End: 2018-01-01

## 2018-01-01 RX ORDER — CARVEDILOL 6.25 MG/1
TABLET ORAL
Qty: 60 TABLET | Refills: 10 | Status: SHIPPED | OUTPATIENT
Start: 2018-01-01 | End: 2018-01-01 | Stop reason: SDUPTHER

## 2018-01-01 RX ORDER — LEVOFLOXACIN 500 MG/1
500 TABLET, FILM COATED ORAL EVERY 24 HOURS
Status: DISCONTINUED | OUTPATIENT
Start: 2018-01-01 | End: 2018-01-01

## 2018-01-01 RX ORDER — LEVOFLOXACIN 5 MG/ML
500 INJECTION, SOLUTION INTRAVENOUS EVERY 24 HOURS
Status: DISCONTINUED | OUTPATIENT
Start: 2018-01-01 | End: 2018-01-01

## 2018-01-01 RX ORDER — SODIUM BICARBONATE 650 MG/1
650 TABLET ORAL 2 TIMES DAILY
Status: COMPLETED | OUTPATIENT
Start: 2018-01-01 | End: 2018-01-01

## 2018-01-01 RX ORDER — ACETAMINOPHEN 325 MG/1
TABLET ORAL
Status: COMPLETED
Start: 2018-01-01 | End: 2018-01-01

## 2018-01-01 RX ORDER — DRONABINOL 2.5 MG/1
2.5 CAPSULE ORAL
Qty: 30 CAPSULE | Refills: 5 | Status: SHIPPED | OUTPATIENT
Start: 2018-01-01

## 2018-01-01 RX ORDER — TRIAMCINOLONE ACETONIDE 40 MG/ML
40 INJECTION, SUSPENSION INTRA-ARTICULAR; INTRAMUSCULAR ONCE
Status: DISCONTINUED | OUTPATIENT
Start: 2018-01-01 | End: 2018-01-01

## 2018-01-01 RX ORDER — SODIUM CHLORIDE 9 MG/ML
50 INJECTION, SOLUTION INTRAVENOUS CONTINUOUS
Status: DISCONTINUED | OUTPATIENT
Start: 2018-01-01 | End: 2018-01-01

## 2018-01-01 RX ORDER — NYSTATIN 100000 U/G
OINTMENT TOPICAL EVERY 12 HOURS SCHEDULED
Status: DISCONTINUED | OUTPATIENT
Start: 2018-01-01 | End: 2018-01-01 | Stop reason: RX

## 2018-01-01 RX ORDER — DOCUSATE SODIUM 100 MG/1
100 CAPSULE, LIQUID FILLED ORAL 2 TIMES DAILY
Qty: 60 CAPSULE | Refills: 5 | Status: SHIPPED | OUTPATIENT
Start: 2018-01-01 | End: 2018-01-01 | Stop reason: SDUPTHER

## 2018-01-01 RX ORDER — DIAPER,BRIEF,INFANT-TODD,DISP
EACH MISCELLANEOUS
Status: DISCONTINUED
Start: 2018-01-01 | End: 2018-01-01 | Stop reason: HOSPADM

## 2018-01-01 RX ORDER — ACETAMINOPHEN 325 MG/1
325 TABLET ORAL ONCE
Status: COMPLETED | OUTPATIENT
Start: 2018-01-01 | End: 2018-01-01

## 2018-01-01 RX ORDER — LEVOFLOXACIN 500 MG/1
500 TABLET, FILM COATED ORAL DAILY
Qty: 7 TABLET | Refills: 0 | Status: SHIPPED | OUTPATIENT
Start: 2018-01-01 | End: 2018-01-01

## 2018-01-01 RX ORDER — CARVEDILOL 6.25 MG/1
TABLET ORAL
Qty: 60 TABLET | Refills: 5 | Status: SHIPPED | OUTPATIENT
Start: 2018-01-01 | End: 2018-01-01 | Stop reason: SDUPTHER

## 2018-01-01 RX ORDER — DOCUSATE SODIUM 100 MG/1
100 CAPSULE, LIQUID FILLED ORAL 2 TIMES DAILY
Status: DISCONTINUED | OUTPATIENT
Start: 2018-01-01 | End: 2018-01-01 | Stop reason: HOSPADM

## 2018-01-01 RX ORDER — LIDOCAINE HYDROCHLORIDE 10 MG/ML
10 INJECTION, SOLUTION EPIDURAL; INFILTRATION; INTRACAUDAL; PERINEURAL ONCE
Status: COMPLETED | OUTPATIENT
Start: 2018-01-01 | End: 2018-01-01

## 2018-01-01 RX ORDER — CLINDAMYCIN HYDROCHLORIDE 300 MG/1
300 CAPSULE ORAL 3 TIMES DAILY
Qty: 30 CAPSULE | Refills: 0 | Status: SHIPPED | OUTPATIENT
Start: 2018-01-01 | End: 2018-01-01 | Stop reason: HOSPADM

## 2018-01-01 RX ORDER — DIPHENOXYLATE HYDROCHLORIDE AND ATROPINE SULFATE 2.5; .025 MG/1; MG/1
TABLET ORAL
Qty: 10 TABLET | Refills: 1 | Status: SHIPPED | OUTPATIENT
Start: 2018-01-01

## 2018-01-01 RX ORDER — CARVEDILOL 6.25 MG/1
6.25 TABLET ORAL 2 TIMES DAILY WITH MEALS
Status: DISCONTINUED | OUTPATIENT
Start: 2018-01-01 | End: 2018-01-01

## 2018-01-01 RX ORDER — NYSTATIN 100000 U/G
OINTMENT TOPICAL
COMMUNITY
Start: 2018-01-01

## 2018-01-01 RX ORDER — ONDANSETRON 4 MG/1
4 TABLET, FILM COATED ORAL EVERY 8 HOURS PRN
Qty: 30 TABLET | Refills: 0 | Status: SHIPPED | OUTPATIENT
Start: 2018-01-01

## 2018-01-01 RX ORDER — ROPINIROLE 0.5 MG/1
0.5 TABLET, FILM COATED ORAL EVERY EVENING
Status: DISCONTINUED | OUTPATIENT
Start: 2018-01-01 | End: 2018-01-01 | Stop reason: HOSPADM

## 2018-01-01 RX ORDER — TIZANIDINE 4 MG/1
4 TABLET ORAL 3 TIMES DAILY
Qty: 90 TABLET | Refills: 1 | Status: SHIPPED | OUTPATIENT
Start: 2018-01-01 | End: 2018-01-01

## 2018-01-01 RX ORDER — DIPHENOXYLATE HYDROCHLORIDE AND ATROPINE SULFATE 2.5; .025 MG/1; MG/1
1 TABLET ORAL 4 TIMES DAILY PRN
Status: DISCONTINUED | OUTPATIENT
Start: 2018-01-01 | End: 2018-01-01 | Stop reason: HOSPADM

## 2018-01-01 RX ORDER — CALCIUM CARBONATE 200(500)MG
1 TABLET,CHEWABLE ORAL 2 TIMES DAILY
Qty: 30 TABLET | Refills: 1 | Status: SHIPPED | OUTPATIENT
Start: 2018-01-01

## 2018-01-01 RX ORDER — BRIMONIDINE TARTRATE 0.15 %
1 DROPS OPHTHALMIC (EYE) 3 TIMES DAILY
Status: DISCONTINUED | OUTPATIENT
Start: 2018-01-01 | End: 2018-01-01 | Stop reason: HOSPADM

## 2018-01-01 RX ORDER — SODIUM CHLORIDE 9 MG/ML
INJECTION, SOLUTION INTRAVENOUS
Status: COMPLETED
Start: 2018-01-01 | End: 2018-01-01

## 2018-01-01 RX ORDER — SODIUM CHLORIDE 0.9 % (FLUSH) 0.9 %
1-10 SYRINGE (ML) INJECTION AS NEEDED
Status: DISCONTINUED | OUTPATIENT
Start: 2018-01-01 | End: 2018-01-01 | Stop reason: HOSPADM

## 2018-01-01 RX ORDER — FUROSEMIDE 20 MG/1
TABLET ORAL
Qty: 60 TABLET | Refills: 10 | Status: ON HOLD | OUTPATIENT
Start: 2018-01-01 | End: 2018-01-01

## 2018-01-01 RX ORDER — LEVOFLOXACIN 5 MG/ML
250 INJECTION, SOLUTION INTRAVENOUS
Status: COMPLETED | OUTPATIENT
Start: 2018-01-01 | End: 2018-01-01

## 2018-01-01 RX ORDER — CHOLECALCIFEROL (VITAMIN D3) 1250 MCG
50000 CAPSULE ORAL ONCE
Status: COMPLETED | OUTPATIENT
Start: 2018-01-01 | End: 2018-01-01

## 2018-01-01 RX ORDER — MIRTAZAPINE 15 MG/1
15 TABLET, FILM COATED ORAL NIGHTLY
Qty: 30 TABLET | Refills: 11 | Status: SHIPPED | OUTPATIENT
Start: 2018-01-01

## 2018-01-01 RX ORDER — ONDANSETRON 4 MG/1
4 TABLET, FILM COATED ORAL EVERY 8 HOURS PRN
Status: DISCONTINUED | OUTPATIENT
Start: 2018-01-01 | End: 2018-01-01 | Stop reason: HOSPADM

## 2018-01-01 RX ORDER — MORPHINE SULFATE 100 MG/5ML
5 SOLUTION ORAL
Qty: 30 ML | Refills: 0 | Status: SHIPPED | OUTPATIENT
Start: 2018-01-01

## 2018-01-01 RX ORDER — DOCUSATE SODIUM 100 MG/1
100 CAPSULE, LIQUID FILLED ORAL 2 TIMES DAILY
Qty: 60 CAPSULE | Refills: 5 | Status: SHIPPED | OUTPATIENT
Start: 2018-01-01 | End: 2018-01-01 | Stop reason: HOSPADM

## 2018-01-01 RX ORDER — SODIUM CHLORIDE 0.9 % (FLUSH) 0.9 %
3-10 SYRINGE (ML) INJECTION AS NEEDED
Status: DISCONTINUED | OUTPATIENT
Start: 2018-01-01 | End: 2018-01-01 | Stop reason: HOSPADM

## 2018-01-01 RX ORDER — FUROSEMIDE 20 MG/1
20 TABLET ORAL DAILY
COMMUNITY
End: 2018-01-01

## 2018-01-01 RX ORDER — CALCIUM CARBONATE 200(500)MG
2 TABLET,CHEWABLE ORAL 3 TIMES DAILY
Status: DISCONTINUED | OUTPATIENT
Start: 2018-01-01 | End: 2018-01-01

## 2018-01-01 RX ORDER — LEVOFLOXACIN 5 MG/ML
500 INJECTION, SOLUTION INTRAVENOUS ONCE
Status: COMPLETED | OUTPATIENT
Start: 2018-01-01 | End: 2018-01-01

## 2018-01-01 RX ORDER — SODIUM CHLORIDE 9 MG/ML
125 INJECTION, SOLUTION INTRAVENOUS CONTINUOUS
Status: DISCONTINUED | OUTPATIENT
Start: 2018-01-01 | End: 2018-01-01

## 2018-01-01 RX ORDER — HYDROCODONE BITARTRATE AND ACETAMINOPHEN 5; 325 MG/1; MG/1
0.5 TABLET ORAL EVERY 8 HOURS PRN
Qty: 6 TABLET | Refills: 0 | Status: SHIPPED | OUTPATIENT
Start: 2018-01-01 | End: 2018-01-01

## 2018-01-01 RX ADMIN — CARVEDILOL 3.12 MG: 3.12 TABLET, FILM COATED ORAL at 23:42

## 2018-01-01 RX ADMIN — MEGESTROL ACETATE 400 MG: 40 SUSPENSION ORAL at 20:26

## 2018-01-01 RX ADMIN — ACETAMINOPHEN 650 MG: 325 TABLET ORAL at 13:51

## 2018-01-01 RX ADMIN — BRIMONIDINE TARTRATE 1 DROP: 1.5 SOLUTION OPHTHALMIC at 20:25

## 2018-01-01 RX ADMIN — BRIMONIDINE TARTRATE 1 DROP: 1.5 SOLUTION OPHTHALMIC at 22:00

## 2018-01-01 RX ADMIN — SODIUM CHLORIDE 100 ML/HR: 9 INJECTION, SOLUTION INTRAVENOUS at 10:55

## 2018-01-01 RX ADMIN — LIDOCAINE HYDROCHLORIDE 10 ML: 10 INJECTION, SOLUTION EPIDURAL; INFILTRATION; INTRACAUDAL at 20:11

## 2018-01-01 RX ADMIN — ACETAMINOPHEN 650 MG: 325 TABLET ORAL at 15:43

## 2018-01-01 RX ADMIN — SODIUM CHLORIDE, PRESERVATIVE FREE 3 ML: 5 INJECTION INTRAVENOUS at 20:55

## 2018-01-01 RX ADMIN — ROPINIROLE HYDROCHLORIDE 0.5 MG: 0.5 TABLET, FILM COATED ORAL at 20:55

## 2018-01-01 RX ADMIN — ROPINIROLE HYDROCHLORIDE 0.5 MG: 0.5 TABLET, FILM COATED ORAL at 17:10

## 2018-01-01 RX ADMIN — CARVEDILOL 3.12 MG: 3.12 TABLET, FILM COATED ORAL at 08:15

## 2018-01-01 RX ADMIN — ROPINIROLE HYDROCHLORIDE 0.5 MG: 0.5 TABLET, FILM COATED ORAL at 23:42

## 2018-01-01 RX ADMIN — SODIUM BICARBONATE 650 MG TABLET 650 MG: at 09:35

## 2018-01-01 RX ADMIN — FAMOTIDINE 40 MG: 40 TABLET ORAL at 20:50

## 2018-01-01 RX ADMIN — BRIMONIDINE TARTRATE 1 DROP: 1.5 SOLUTION OPHTHALMIC at 16:42

## 2018-01-01 RX ADMIN — LEVOFLOXACIN 500 MG: 5 INJECTION, SOLUTION INTRAVENOUS at 19:39

## 2018-01-01 RX ADMIN — ENOXAPARIN SODIUM 50 MG: 60 INJECTION SUBCUTANEOUS at 20:54

## 2018-01-01 RX ADMIN — SODIUM BICARBONATE 650 MG TABLET 650 MG: at 08:56

## 2018-01-01 RX ADMIN — CARVEDILOL 6.25 MG: 6.25 TABLET, FILM COATED ORAL at 17:39

## 2018-01-01 RX ADMIN — DOCUSATE SODIUM 100 MG: 100 CAPSULE, LIQUID FILLED ORAL at 07:28

## 2018-01-01 RX ADMIN — CALCIUM CARBONATE (ANTACID) CHEW TAB 500 MG 2 TABLET: 500 CHEW TAB at 08:50

## 2018-01-01 RX ADMIN — CALCIUM CARBONATE (ANTACID) CHEW TAB 500 MG 1 TABLET: 500 CHEW TAB at 08:15

## 2018-01-01 RX ADMIN — Medication 10 ML: at 14:22

## 2018-01-01 RX ADMIN — ROPINIROLE HYDROCHLORIDE 0.5 MG: 0.5 TABLET, FILM COATED ORAL at 16:42

## 2018-01-01 RX ADMIN — CALCIUM CARBONATE (ANTACID) CHEW TAB 500 MG 1 TABLET: 500 CHEW TAB at 08:49

## 2018-01-01 RX ADMIN — DRONABINOL 2.5 MG: 2.5 CAPSULE ORAL at 08:15

## 2018-01-01 RX ADMIN — Medication 3 ML: at 08:57

## 2018-01-01 RX ADMIN — CALCIUM CARBONATE (ANTACID) CHEW TAB 500 MG 2 TABLET: 500 CHEW TAB at 20:20

## 2018-01-01 RX ADMIN — MAGNESIUM SULFATE HEPTAHYDRATE 1 G: 1 INJECTION, SOLUTION INTRAVENOUS at 18:27

## 2018-01-01 RX ADMIN — ACETAMINOPHEN 650 MG: 325 TABLET ORAL at 12:15

## 2018-01-01 RX ADMIN — FAMOTIDINE 40 MG: 40 TABLET ORAL at 08:38

## 2018-01-01 RX ADMIN — ACETAMINOPHEN 650 MG: 325 TABLET ORAL at 21:08

## 2018-01-01 RX ADMIN — CALCIUM CARBONATE (ANTACID) CHEW TAB 500 MG 1 TABLET: 500 CHEW TAB at 08:06

## 2018-01-01 RX ADMIN — CALCIUM CARBONATE (ANTACID) CHEW TAB 500 MG 1 TABLET: 500 CHEW TAB at 21:33

## 2018-01-01 RX ADMIN — TETANUS TOXOID, REDUCED DIPHTHERIA TOXOID AND ACELLULAR PERTUSSIS VACCINE, ADSORBED 0.5 ML: 5; 2.5; 8; 8; 2.5 SUSPENSION INTRAMUSCULAR at 17:32

## 2018-01-01 RX ADMIN — DOCUSATE SODIUM 100 MG: 100 CAPSULE, LIQUID FILLED ORAL at 08:26

## 2018-01-01 RX ADMIN — FAMOTIDINE 40 MG: 40 TABLET ORAL at 08:45

## 2018-01-01 RX ADMIN — BRIMONIDINE TARTRATE 1 DROP: 1.5 SOLUTION OPHTHALMIC at 20:58

## 2018-01-01 RX ADMIN — DOCUSATE SODIUM 100 MG: 100 CAPSULE, LIQUID FILLED ORAL at 20:50

## 2018-01-01 RX ADMIN — TRIAMCINOLONE ACETONIDE 80 MG: 40 INJECTION, SUSPENSION INTRA-ARTICULAR; INTRAMUSCULAR at 15:15

## 2018-01-01 RX ADMIN — CALCIUM CARBONATE (ANTACID) CHEW TAB 500 MG 2 TABLET: 500 CHEW TAB at 15:27

## 2018-01-01 RX ADMIN — FAMOTIDINE 40 MG: 40 TABLET ORAL at 07:28

## 2018-01-01 RX ADMIN — MEGESTROL ACETATE 400 MG: 40 SUSPENSION ORAL at 20:58

## 2018-01-01 RX ADMIN — FOSFOMYCIN TROMETHAMINE 3 G: 3 POWDER ORAL at 16:17

## 2018-01-01 RX ADMIN — Medication 3 ML: at 09:26

## 2018-01-01 RX ADMIN — CARVEDILOL 12.5 MG: 12.5 TABLET, FILM COATED ORAL at 17:10

## 2018-01-01 RX ADMIN — CARVEDILOL 3.12 MG: 3.12 TABLET, FILM COATED ORAL at 17:20

## 2018-01-01 RX ADMIN — CALCIUM CARBONATE (ANTACID) CHEW TAB 500 MG 1 TABLET: 500 CHEW TAB at 09:48

## 2018-01-01 RX ADMIN — LEVOFLOXACIN 250 MG: 5 INJECTION, SOLUTION INTRAVENOUS at 21:03

## 2018-01-01 RX ADMIN — ACETAMINOPHEN 650 MG: 325 TABLET ORAL at 22:34

## 2018-01-01 RX ADMIN — CARVEDILOL 3.12 MG: 3.12 TABLET, FILM COATED ORAL at 09:25

## 2018-01-01 RX ADMIN — FAMOTIDINE 40 MG: 40 TABLET ORAL at 08:06

## 2018-01-01 RX ADMIN — MIRTAZAPINE 15 MG: 15 TABLET, FILM COATED ORAL at 20:55

## 2018-01-01 RX ADMIN — DOCUSATE SODIUM 100 MG: 100 CAPSULE, LIQUID FILLED ORAL at 21:33

## 2018-01-01 RX ADMIN — LATANOPROST 1 DROP: 50 SOLUTION OPHTHALMIC at 21:03

## 2018-01-01 RX ADMIN — ASPIRIN 81 MG: 81 TABLET, COATED ORAL at 08:27

## 2018-01-01 RX ADMIN — CARVEDILOL 3.12 MG: 3.12 TABLET, FILM COATED ORAL at 08:56

## 2018-01-01 RX ADMIN — MEGESTROL ACETATE 400 MG: 40 SUSPENSION ORAL at 08:50

## 2018-01-01 RX ADMIN — SODIUM CHLORIDE 75 ML/HR: 900 INJECTION, SOLUTION INTRAVENOUS at 20:51

## 2018-01-01 RX ADMIN — ROPINIROLE HYDROCHLORIDE 0.5 MG: 0.5 TABLET, FILM COATED ORAL at 17:38

## 2018-01-01 RX ADMIN — ROPINIROLE HYDROCHLORIDE 0.5 MG: 0.5 TABLET, FILM COATED ORAL at 17:50

## 2018-01-01 RX ADMIN — BRIMONIDINE TARTRATE 1 DROP: 1.5 SOLUTION OPHTHALMIC at 07:29

## 2018-01-01 RX ADMIN — LATANOPROST 1 DROP: 50 SOLUTION OPHTHALMIC at 22:00

## 2018-01-01 RX ADMIN — BRIMONIDINE TARTRATE 1 DROP: 1.5 SOLUTION OPHTHALMIC at 08:06

## 2018-01-01 RX ADMIN — Medication 3 ML: at 08:58

## 2018-01-01 RX ADMIN — SODIUM CHLORIDE, PRESERVATIVE FREE 3 ML: 5 INJECTION INTRAVENOUS at 08:16

## 2018-01-01 RX ADMIN — DOCUSATE SODIUM 100 MG: 100 CAPSULE, LIQUID FILLED ORAL at 08:50

## 2018-01-01 RX ADMIN — CALCIUM CARBONATE (ANTACID) CHEW TAB 500 MG 2 TABLET: 500 CHEW TAB at 08:26

## 2018-01-01 RX ADMIN — BRIMONIDINE TARTRATE 1 DROP: 1.5 SOLUTION OPHTHALMIC at 17:35

## 2018-01-01 RX ADMIN — DOCUSATE SODIUM 100 MG: 100 CAPSULE, LIQUID FILLED ORAL at 21:14

## 2018-01-01 RX ADMIN — ACETAMINOPHEN 650 MG: 325 TABLET ORAL at 22:54

## 2018-01-01 RX ADMIN — CALCIUM CARBONATE (ANTACID) CHEW TAB 500 MG 1 TABLET: 500 CHEW TAB at 20:59

## 2018-01-01 RX ADMIN — NYSTATIN: 100000 CREAM TOPICAL at 20:55

## 2018-01-01 RX ADMIN — ACETAMINOPHEN 650 MG: 325 TABLET ORAL at 23:43

## 2018-01-01 RX ADMIN — VITAMIN D, TAB 1000IU (100/BT) 5000 UNITS: 25 TAB at 09:48

## 2018-01-01 RX ADMIN — SODIUM CHLORIDE, PRESERVATIVE FREE 3 ML: 5 INJECTION INTRAVENOUS at 22:00

## 2018-01-01 RX ADMIN — ASPIRIN 81 MG: 81 TABLET, COATED ORAL at 08:50

## 2018-01-01 RX ADMIN — ROPINIROLE HYDROCHLORIDE 0.5 MG: 0.5 TABLET, FILM COATED ORAL at 17:20

## 2018-01-01 RX ADMIN — SODIUM CHLORIDE 75 ML/HR: 900 INJECTION, SOLUTION INTRAVENOUS at 05:56

## 2018-01-01 RX ADMIN — ACETAMINOPHEN 650 MG: 325 TABLET ORAL at 21:33

## 2018-01-01 RX ADMIN — CALCIUM CARBONATE (ANTACID) CHEW TAB 500 MG 2 TABLET: 500 CHEW TAB at 11:34

## 2018-01-01 RX ADMIN — MEGESTROL ACETATE 400 MG: 40 SUSPENSION ORAL at 21:33

## 2018-01-01 RX ADMIN — CALCIUM GLUCONATE 1 G: 98 INJECTION, SOLUTION INTRAVENOUS at 17:50

## 2018-01-01 RX ADMIN — ASPIRIN 81 MG: 81 TABLET, COATED ORAL at 08:45

## 2018-01-01 RX ADMIN — MEGESTROL ACETATE 400 MG: 40 SUSPENSION ORAL at 07:31

## 2018-01-01 RX ADMIN — LEVOTHYROXINE SODIUM 112 MCG: 112 TABLET ORAL at 06:03

## 2018-01-01 RX ADMIN — BRIMONIDINE TARTRATE 1 DROP: 1.5 SOLUTION OPHTHALMIC at 08:15

## 2018-01-01 RX ADMIN — Medication 10 ML: at 17:19

## 2018-01-01 RX ADMIN — ACETAMINOPHEN 650 MG: 325 TABLET ORAL at 12:57

## 2018-01-01 RX ADMIN — CARVEDILOL 6.25 MG: 6.25 TABLET, FILM COATED ORAL at 08:50

## 2018-01-01 RX ADMIN — ACETAMINOPHEN 650 MG: 325 TABLET ORAL at 13:17

## 2018-01-01 RX ADMIN — MIRTAZAPINE 15 MG: 15 TABLET, FILM COATED ORAL at 22:00

## 2018-01-01 RX ADMIN — ACETAMINOPHEN 650 MG: 325 TABLET ORAL at 13:23

## 2018-01-01 RX ADMIN — ACETAMINOPHEN 650 MG: 325 TABLET ORAL at 09:13

## 2018-01-01 RX ADMIN — ROPINIROLE HYDROCHLORIDE 0.5 MG: 0.5 TABLET, FILM COATED ORAL at 17:17

## 2018-01-01 RX ADMIN — HYDROCORTISONE: 1 CREAM TOPICAL at 08:06

## 2018-01-01 RX ADMIN — Medication 10 ML: at 08:16

## 2018-01-01 RX ADMIN — CALCIUM CARBONATE (ANTACID) CHEW TAB 500 MG 1 TABLET: 500 CHEW TAB at 22:00

## 2018-01-01 RX ADMIN — MIRTAZAPINE 15 MG: 15 TABLET, FILM COATED ORAL at 21:14

## 2018-01-01 RX ADMIN — MEGESTROL ACETATE 400 MG: 40 SUSPENSION ORAL at 20:50

## 2018-01-01 RX ADMIN — DOCUSATE SODIUM 100 MG: 100 CAPSULE, LIQUID FILLED ORAL at 21:09

## 2018-01-01 RX ADMIN — TRIAMCINOLONE ACETONIDE 80 MG: 40 INJECTION, SUSPENSION INTRA-ARTICULAR; INTRAMUSCULAR at 13:50

## 2018-01-01 RX ADMIN — MIRTAZAPINE 15 MG: 15 TABLET, FILM COATED ORAL at 21:09

## 2018-01-01 RX ADMIN — CARVEDILOL 6.25 MG: 6.25 TABLET, FILM COATED ORAL at 20:50

## 2018-01-01 RX ADMIN — VITAMIN D, TAB 1000IU (100/BT) 5000 UNITS: 25 TAB at 08:45

## 2018-01-01 RX ADMIN — FAMOTIDINE 40 MG: 40 TABLET ORAL at 09:00

## 2018-01-01 RX ADMIN — CARVEDILOL 3.12 MG: 3.12 TABLET, FILM COATED ORAL at 09:48

## 2018-01-01 RX ADMIN — HYDROCORTISONE: 1 CREAM TOPICAL at 08:56

## 2018-01-01 RX ADMIN — MIRTAZAPINE 15 MG: 15 TABLET, FILM COATED ORAL at 20:25

## 2018-01-01 RX ADMIN — MEGESTROL ACETATE 400 MG: 40 SUSPENSION ORAL at 08:15

## 2018-01-01 RX ADMIN — VITAMIN D, TAB 1000IU (100/BT) 5000 UNITS: 25 TAB at 20:55

## 2018-01-01 RX ADMIN — SODIUM CHLORIDE 100 ML/HR: 9 INJECTION, SOLUTION INTRAVENOUS at 23:47

## 2018-01-01 RX ADMIN — CARVEDILOL 3.12 MG: 3.12 TABLET, FILM COATED ORAL at 08:57

## 2018-01-01 RX ADMIN — SODIUM BICARBONATE 50 MEQ: 84 INJECTION INTRAVENOUS at 09:26

## 2018-01-01 RX ADMIN — ROPINIROLE HYDROCHLORIDE 0.5 MG: 0.5 TABLET, FILM COATED ORAL at 17:35

## 2018-01-01 RX ADMIN — MIRTAZAPINE 15 MG: 15 TABLET, FILM COATED ORAL at 20:20

## 2018-01-01 RX ADMIN — MIRTAZAPINE 15 MG: 15 TABLET, FILM COATED ORAL at 20:50

## 2018-01-01 RX ADMIN — DOCUSATE SODIUM 100 MG: 100 CAPSULE, LIQUID FILLED ORAL at 08:59

## 2018-01-01 RX ADMIN — CALCIUM GLUCONATE 1 G: 94 INJECTION, SOLUTION INTRAVENOUS at 17:21

## 2018-01-01 RX ADMIN — ASPIRIN 81 MG: 81 TABLET, COATED ORAL at 20:50

## 2018-01-01 RX ADMIN — ACETAMINOPHEN 650 MG: 325 TABLET ORAL at 11:09

## 2018-01-01 RX ADMIN — LEVOTHYROXINE SODIUM 112 MCG: 112 TABLET ORAL at 05:08

## 2018-01-01 RX ADMIN — LEVOTHYROXINE SODIUM 112 MCG: 112 TABLET ORAL at 05:48

## 2018-01-01 RX ADMIN — SODIUM CHLORIDE 125 ML/HR: 9 INJECTION, SOLUTION INTRAVENOUS at 17:20

## 2018-01-01 RX ADMIN — ACETAMINOPHEN 650 MG: 325 TABLET ORAL at 08:56

## 2018-01-01 RX ADMIN — CARVEDILOL 12.5 MG: 12.5 TABLET, FILM COATED ORAL at 08:26

## 2018-01-01 RX ADMIN — LATANOPROST 1 DROP: 50 SOLUTION OPHTHALMIC at 20:53

## 2018-01-01 RX ADMIN — CALCIUM CARBONATE (ANTACID) CHEW TAB 500 MG 2 TABLET: 500 CHEW TAB at 17:20

## 2018-01-01 RX ADMIN — HYDROCORTISONE: 1 CREAM TOPICAL at 16:42

## 2018-01-01 RX ADMIN — FUROSEMIDE 20 MG: 20 TABLET ORAL at 08:57

## 2018-01-01 RX ADMIN — DOCUSATE SODIUM 100 MG: 100 CAPSULE, LIQUID FILLED ORAL at 08:05

## 2018-01-01 RX ADMIN — SODIUM BICARBONATE 650 MG TABLET 650 MG: at 20:16

## 2018-01-01 RX ADMIN — MIRTAZAPINE 15 MG: 15 TABLET, FILM COATED ORAL at 21:33

## 2018-01-01 RX ADMIN — LEVOTHYROXINE SODIUM 112 MCG: 112 TABLET ORAL at 05:14

## 2018-01-01 RX ADMIN — CARVEDILOL 12.5 MG: 12.5 TABLET, FILM COATED ORAL at 08:05

## 2018-01-01 RX ADMIN — CARVEDILOL 12.5 MG: 12.5 TABLET, FILM COATED ORAL at 07:27

## 2018-01-01 RX ADMIN — CARVEDILOL 6.25 MG: 6.25 TABLET, FILM COATED ORAL at 09:00

## 2018-01-01 RX ADMIN — LEVOTHYROXINE SODIUM 112 MCG: 112 TABLET ORAL at 08:57

## 2018-01-01 RX ADMIN — MEGESTROL ACETATE 400 MG: 40 SUSPENSION ORAL at 21:14

## 2018-01-01 RX ADMIN — HYDROCORTISONE: 1 CREAM TOPICAL at 09:50

## 2018-01-01 RX ADMIN — MEGESTROL ACETATE 400 MG: 40 SUSPENSION ORAL at 20:20

## 2018-01-01 RX ADMIN — VITAMIN D, TAB 1000IU (100/BT) 5000 UNITS: 25 TAB at 08:05

## 2018-01-01 RX ADMIN — CALCIUM CARBONATE (ANTACID) CHEW TAB 500 MG 1 TABLET: 500 CHEW TAB at 20:25

## 2018-01-01 RX ADMIN — LEVOTHYROXINE SODIUM 112 MCG: 112 TABLET ORAL at 05:22

## 2018-01-01 RX ADMIN — BRIMONIDINE TARTRATE 1 DROP: 1.5 SOLUTION OPHTHALMIC at 20:54

## 2018-01-01 RX ADMIN — ASPIRIN 81 MG: 81 TABLET, COATED ORAL at 08:06

## 2018-01-01 RX ADMIN — ASPIRIN 81 MG: 81 TABLET, COATED ORAL at 08:35

## 2018-01-01 RX ADMIN — BRIMONIDINE TARTRATE 1 DROP: 1.5 SOLUTION OPHTHALMIC at 17:10

## 2018-01-01 RX ADMIN — LATANOPROST 1 DROP: 50 SOLUTION OPHTHALMIC at 21:34

## 2018-01-01 RX ADMIN — LATANOPROST 1 DROP: 50 SOLUTION OPHTHALMIC at 20:26

## 2018-01-01 RX ADMIN — CARVEDILOL 3.12 MG: 3.12 TABLET, FILM COATED ORAL at 18:02

## 2018-01-01 RX ADMIN — BRIMONIDINE TARTRATE 1 DROP: 1.5 SOLUTION OPHTHALMIC at 21:33

## 2018-01-01 RX ADMIN — ACETAMINOPHEN 650 MG: 325 TABLET ORAL at 05:16

## 2018-01-01 RX ADMIN — HYDROCORTISONE: 1 CREAM TOPICAL at 20:54

## 2018-01-01 RX ADMIN — LEVOTHYROXINE SODIUM 112 MCG: 112 TABLET ORAL at 05:34

## 2018-01-01 RX ADMIN — BRIMONIDINE TARTRATE 1 DROP: 1.5 SOLUTION OPHTHALMIC at 09:48

## 2018-01-01 RX ADMIN — DOCUSATE SODIUM 100 MG: 100 CAPSULE, LIQUID FILLED ORAL at 20:20

## 2018-01-01 RX ADMIN — CALCIUM CARBONATE (ANTACID) CHEW TAB 500 MG 1 TABLET: 500 CHEW TAB at 20:55

## 2018-01-01 RX ADMIN — FAMOTIDINE 40 MG: 40 TABLET ORAL at 08:26

## 2018-01-01 RX ADMIN — DOCUSATE SODIUM 100 MG: 100 CAPSULE, LIQUID FILLED ORAL at 08:36

## 2018-01-01 RX ADMIN — LEVOFLOXACIN 500 MG: 5 INJECTION, SOLUTION INTRAVENOUS at 16:42

## 2018-01-01 RX ADMIN — ONDANSETRON 4 MG: 4 TABLET, ORALLY DISINTEGRATING ORAL at 17:31

## 2018-01-01 RX ADMIN — MEGESTROL ACETATE 400 MG: 40 SUSPENSION ORAL at 08:25

## 2018-01-01 RX ADMIN — ASPIRIN 81 MG: 81 TABLET, COATED ORAL at 07:25

## 2018-01-01 RX ADMIN — ACETAMINOPHEN 650 MG: 325 TABLET ORAL at 16:41

## 2018-01-01 RX ADMIN — DENOSUMAB 60 MG: 60 INJECTION SUBCUTANEOUS at 10:35

## 2018-01-01 RX ADMIN — OFLOXACIN 50000 UNITS: 300 TABLET, COATED ORAL at 10:36

## 2018-01-01 RX ADMIN — MEGESTROL ACETATE 400 MG: 40 SUSPENSION ORAL at 20:54

## 2018-01-01 RX ADMIN — DRONABINOL 2.5 MG: 2.5 CAPSULE ORAL at 20:55

## 2018-01-01 RX ADMIN — CALCIUM GLUCONATE 1 G: 98 INJECTION, SOLUTION INTRAVENOUS at 10:36

## 2018-01-01 RX ADMIN — SODIUM CHLORIDE 50 ML/HR: 900 INJECTION, SOLUTION INTRAVENOUS at 05:55

## 2018-01-01 RX ADMIN — HYDROCORTISONE: 1 CREAM TOPICAL at 22:00

## 2018-01-01 RX ADMIN — ACETAMINOPHEN 650 MG: 325 TABLET ORAL at 04:27

## 2018-01-01 RX ADMIN — CARVEDILOL 12.5 MG: 12.5 TABLET, FILM COATED ORAL at 17:16

## 2018-01-01 RX ADMIN — ACETAMINOPHEN 650 MG: 325 TABLET ORAL at 15:27

## 2018-01-01 RX ADMIN — CALCIUM CARBONATE (ANTACID) CHEW TAB 500 MG 2 TABLET: 500 CHEW TAB at 23:04

## 2018-01-01 RX ADMIN — FOSFOMYCIN TROMETHAMINE 3 G: 3 POWDER ORAL at 11:18

## 2018-01-01 RX ADMIN — VITAMIN D, TAB 1000IU (100/BT) 5000 UNITS: 25 TAB at 08:15

## 2018-01-01 RX ADMIN — LEVOFLOXACIN 500 MG: 500 TABLET, FILM COATED ORAL at 20:16

## 2018-01-01 RX ADMIN — ACETAMINOPHEN 650 MG: 325 TABLET ORAL at 18:40

## 2018-01-01 RX ADMIN — LEVOTHYROXINE SODIUM 112 MCG: 112 TABLET ORAL at 05:55

## 2018-01-01 RX ADMIN — CARVEDILOL 6.25 MG: 6.25 TABLET, FILM COATED ORAL at 17:50

## 2018-01-01 RX ADMIN — MEGESTROL ACETATE 400 MG: 40 SUSPENSION ORAL at 08:38

## 2018-01-01 RX ADMIN — MEGESTROL ACETATE 400 MG: 40 SUSPENSION ORAL at 09:00

## 2018-01-01 RX ADMIN — ROPINIROLE HYDROCHLORIDE 0.5 MG: 0.5 TABLET, FILM COATED ORAL at 17:19

## 2018-01-01 RX ADMIN — LEVOFLOXACIN 250 MG: 5 INJECTION, SOLUTION INTRAVENOUS at 20:51

## 2018-01-01 RX ADMIN — CALCIUM CARBONATE (ANTACID) CHEW TAB 500 MG 1 TABLET: 500 CHEW TAB at 07:28

## 2018-01-01 RX ADMIN — SODIUM BICARBONATE 650 MG TABLET 650 MG: at 21:16

## 2018-01-01 RX ADMIN — CALCIUM CARBONATE (ANTACID) CHEW TAB 500 MG 2 TABLET: 500 CHEW TAB at 21:09

## 2018-01-01 RX ADMIN — LEVOTHYROXINE SODIUM 112 MCG: 112 TABLET ORAL at 06:24

## 2018-01-01 RX ADMIN — SODIUM CHLORIDE, PRESERVATIVE FREE 3 ML: 5 INJECTION INTRAVENOUS at 09:49

## 2018-01-01 RX ADMIN — CARVEDILOL 3.12 MG: 3.12 TABLET, FILM COATED ORAL at 20:55

## 2018-01-01 RX ADMIN — ACETAMINOPHEN 650 MG: 325 TABLET ORAL at 18:26

## 2018-01-01 RX ADMIN — VITAMIN D, TAB 1000IU (100/BT) 5000 UNITS: 25 TAB at 08:40

## 2018-01-01 RX ADMIN — DENOSUMAB 60 MG: 60 INJECTION SUBCUTANEOUS at 10:05

## 2018-01-01 RX ADMIN — BRIMONIDINE TARTRATE 1 DROP: 1.5 SOLUTION OPHTHALMIC at 08:46

## 2018-01-01 RX ADMIN — NYSTATIN: 100000 CREAM TOPICAL at 22:00

## 2018-01-01 RX ADMIN — Medication 10 ML: at 11:56

## 2018-01-01 RX ADMIN — ACETAMINOPHEN 650 MG: 325 TABLET ORAL at 17:16

## 2018-01-01 RX ADMIN — ROPINIROLE HYDROCHLORIDE 0.5 MG: 0.5 TABLET, FILM COATED ORAL at 20:50

## 2018-01-01 RX ADMIN — MIRTAZAPINE 15 MG: 15 TABLET, FILM COATED ORAL at 20:59

## 2018-01-01 RX ADMIN — CARVEDILOL 3.12 MG: 3.12 TABLET, FILM COATED ORAL at 16:42

## 2018-01-01 RX ADMIN — DRONABINOL 2.5 MG: 2.5 CAPSULE ORAL at 09:49

## 2018-01-01 RX ADMIN — TRIAMCINOLONE ACETONIDE 80 MG: 40 INJECTION, SUSPENSION INTRA-ARTICULAR; INTRAMUSCULAR at 14:02

## 2018-01-01 RX ADMIN — HYDROCODONE BITARTRATE AND ACETAMINOPHEN 1 TABLET: 5; 325 TABLET ORAL at 17:29

## 2018-01-01 RX ADMIN — SODIUM CHLORIDE 500 ML: 900 INJECTION, SOLUTION INTRAVENOUS at 16:21

## 2018-01-01 RX ADMIN — LEVOTHYROXINE SODIUM 112 MCG: 112 TABLET ORAL at 05:16

## 2018-01-01 RX ADMIN — CALCIUM CARBONATE (ANTACID) CHEW TAB 500 MG 2 TABLET: 500 CHEW TAB at 17:38

## 2018-01-01 RX ADMIN — NYSTATIN: 100000 CREAM TOPICAL at 08:14

## 2018-01-01 RX ADMIN — MEGESTROL ACETATE 400 MG: 40 SUSPENSION ORAL at 08:06

## 2018-01-01 RX ADMIN — VITAMIN D, TAB 1000IU (100/BT) 5000 UNITS: 25 TAB at 09:00

## 2018-01-01 RX ADMIN — CALCIUM CARBONATE (ANTACID) CHEW TAB 500 MG 2 TABLET: 500 CHEW TAB at 09:00

## 2018-01-01 RX ADMIN — ACETAMINOPHEN 650 MG: 325 TABLET ORAL at 03:12

## 2018-01-01 RX ADMIN — LEVOTHYROXINE SODIUM 112 MCG: 112 TABLET ORAL at 05:30

## 2018-01-01 RX ADMIN — DRONABINOL 2.5 MG: 2.5 CAPSULE ORAL at 16:42

## 2018-01-01 RX ADMIN — NYSTATIN: 100000 CREAM TOPICAL at 09:49

## 2018-01-01 RX ADMIN — CALCIUM CARBONATE (ANTACID) CHEW TAB 500 MG 2 TABLET: 500 CHEW TAB at 21:14

## 2018-01-01 RX ADMIN — FAMOTIDINE 40 MG: 40 TABLET ORAL at 08:50

## 2018-01-01 RX ADMIN — CARVEDILOL 12.5 MG: 12.5 TABLET, FILM COATED ORAL at 08:45

## 2018-01-01 RX ADMIN — MEGESTROL ACETATE 400 MG: 40 SUSPENSION ORAL at 21:09

## 2018-01-01 RX ADMIN — LEVOFLOXACIN 250 MG: 5 INJECTION, SOLUTION INTRAVENOUS at 21:09

## 2018-01-01 RX ADMIN — ACETAMINOPHEN 325 MG: 325 TABLET ORAL at 18:45

## 2018-01-01 RX ADMIN — CARVEDILOL 6.25 MG: 6.25 TABLET, FILM COATED ORAL at 08:36

## 2018-01-01 RX ADMIN — ACETAMINOPHEN 650 MG: 325 TABLET ORAL at 08:57

## 2018-01-01 RX ADMIN — ASPIRIN 81 MG: 81 TABLET, COATED ORAL at 09:00

## 2018-01-01 RX ADMIN — VITAMIN D, TAB 1000IU (100/BT) 5000 UNITS: 25 TAB at 08:26

## 2018-01-01 RX ADMIN — HYDROCORTISONE: 1 CREAM TOPICAL at 08:17

## 2018-01-01 RX ADMIN — MEGESTROL ACETATE 400 MG: 40 SUSPENSION ORAL at 08:45

## 2018-01-01 RX ADMIN — CARVEDILOL 12.5 MG: 12.5 TABLET, FILM COATED ORAL at 18:26

## 2018-01-01 RX ADMIN — VITAMIN D, TAB 1000IU (100/BT) 5000 UNITS: 25 TAB at 07:28

## 2018-01-01 RX ADMIN — CARVEDILOL 12.5 MG: 12.5 TABLET, FILM COATED ORAL at 17:35

## 2018-01-09 NOTE — PROGRESS NOTES
Subjective   Tiffanie Arroyo is a 89 y.o. female.     Knee Pain    The incident occurred more than 1 week ago. The pain is present in the left knee and right knee. The pain is at a severity of 9/10.   Back Pain   This is a chronic problem. The current episode started more than 1 year ago. The problem is unchanged. The pain is present in the lumbar spine and sacro-iliac. Quality: sharp minly. The pain does not radiate. The pain is at a severity of 8/10. The pain is moderate. The pain is the same all the time. The symptoms are aggravated by sitting and lying down. Stiffness is present in the morning. Pertinent negatives include no bladder incontinence, bowel incontinence or dysuria.        The following portions of the patient's history were reviewed and updated as appropriate: allergies, current medications, past family history, past medical history, past social history, past surgical history and problem list.    Review of Systems   Gastrointestinal: Negative for bowel incontinence.   Genitourinary: Negative for bladder incontinence and dysuria.   Musculoskeletal: Positive for back pain.       Objective   Physical Exam   Constitutional: She is oriented to person, place, and time. She appears well-developed and well-nourished. No distress.   HENT:   Head: Normocephalic and atraumatic.   Musculoskeletal:        Left knee: She exhibits decreased range of motion and swelling. Tenderness found.        Lumbar back: She exhibits decreased range of motion, tenderness and pain. She exhibits no bony tenderness, no swelling, no edema, no deformity, no laceration and no spasm.       Vascular Status -  Her exam exhibits no right foot edema. Her exam exhibits no left foot edema.  Neurological: She is alert and oriented to person, place, and time.   Reflex Scores:       Patellar reflexes are 2+ on the right side and 2+ on the left side.  Skin: Skin is warm and dry. She is not diaphoretic.   Psychiatric: She has a normal mood and  affect. Her behavior is normal. Judgment and thought content normal.   Nursing note and vitals reviewed.      Assessment/Plan   Problems Addressed this Visit        Nervous and Auditory    Chronic midline low back pain without sciatica - Primary    Relevant Medications    triamcinolone acetonide (KENALOG-40) injection 80 mg (Start on 1/9/2018  2:00 PM)       Musculoskeletal and Integument    Chronic pain of left knee    Relevant Medications    triamcinolone acetonide (KENALOG-40) injection 80 mg (Start on 1/9/2018  2:00 PM)

## 2018-03-14 PROBLEM — M54.2 NECK PAIN: Status: ACTIVE | Noted: 2018-01-01

## 2018-03-14 NOTE — PROGRESS NOTES
Subjective   Tiffanie Arroyo is a 89 y.o. female.     Back Pain   This is a chronic problem. The current episode started more than 1 year ago. The problem is unchanged. The pain is present in the lumbar spine and sacro-iliac. Quality: sharp minly. The pain does not radiate. The pain is at a severity of 8/10. The pain is moderate. The pain is the same all the time. The symptoms are aggravated by sitting and lying down. Pertinent negatives include no bladder incontinence, bowel incontinence, chest pain, fever or weakness.   Knee Pain    The incident occurred more than 1 week ago. The pain is present in the left knee. The pain is at a severity of 8/10. Associated symptoms include a loss of motion and muscle weakness.   Hypertension   This is a chronic problem. The current episode started more than 1 year ago. The problem is unchanged. The problem is controlled. Associated symptoms include neck pain. Pertinent negatives include no chest pain, orthopnea, palpitations, peripheral edema, PND or shortness of breath.   Hypothyroidism   This is a chronic problem. The current episode started more than 1 year ago. The problem has been unchanged. Associated symptoms include neck pain. Pertinent negatives include no chest pain, fever or weakness.   Depression   Visit Type: follow-up  Patient is not experiencing: depressed mood, insomnia, nervousness/anxiety, palpitations, shortness of breath, suicidal ideas, suicidal planning and thoughts of death.    Neck Pain    This is a new problem. The current episode started more than 1 month ago. The problem occurs constantly. The problem has been gradually worsening. The pain is associated with a fall. The pain is present in the occipital region. The quality of the pain is described as aching. The pain is mild. Nothing aggravates the symptoms. Pertinent negatives include no chest pain, fever or weakness.            Review of Systems   Constitutional: Negative for fever.   Respiratory:  Negative for shortness of breath.    Cardiovascular: Negative for chest pain, palpitations, orthopnea and PND.   Gastrointestinal: Negative for bowel incontinence.   Genitourinary: Negative for bladder incontinence.   Musculoskeletal: Positive for back pain and neck pain.   Neurological: Negative for weakness.   Psychiatric/Behavioral: Negative for suicidal ideas. The patient is not nervous/anxious and does not have insomnia.        Objective   Physical Exam   Constitutional: She is oriented to person, place, and time. She appears well-developed and well-nourished. No distress.   HENT:   Head: Normocephalic and atraumatic.   Cardiovascular: Normal rate.  Exam reveals no gallop and no friction rub.    Murmur heard.   Systolic murmur is present with a grade of 2/6   Pulmonary/Chest: Effort normal and breath sounds normal.   Abdominal: Bowel sounds are normal. There is no tenderness.   Musculoskeletal: She exhibits edema and tenderness.        Left knee: She exhibits decreased range of motion and swelling. Tenderness found.        Cervical back: She exhibits decreased range of motion, tenderness and spasm. She exhibits no bony tenderness.        Lumbar back: She exhibits tenderness and pain. She exhibits no bony tenderness, no swelling, no edema, no deformity, no laceration and no spasm.     Vascular Status -  Her right foot exhibits abnormal right foot edema. Her left foot exhibits abnormal left foot edema.  Neurological: She is alert and oriented to person, place, and time.   Skin: Skin is warm and dry. She is not diaphoretic.   Psychiatric: She has a normal mood and affect. Her speech is normal and behavior is normal. Judgment and thought content normal.   Nursing note and vitals reviewed.      Assessment/Plan   Problems Addressed this Visit        Cardiovascular and Mediastinum    Hypertension (Chronic)    Nonrheumatic mitral valve insufficiency       Nervous and Auditory    Chronic midline low back pain without  sciatica       Musculoskeletal and Integument    Chronic pain of left knee      Other Visit Diagnoses     Neck pain    -  Primary    Relevant Medications    triamcinolone acetonide (KENALOG-40) injection 80 mg (Completed) (Start on 3/14/2018  2:30 PM)

## 2018-04-12 NOTE — PROGRESS NOTES
QUICK REFERENCE INFORMATION:  The ABCs of the Annual Wellness Visit    Initial Medicare Wellness Visit    HEALTH RISK ASSESSMENT    9/10/1926    Recent Hospitalizations:  Recently treated at the following:  [facility].        Current Medical Providers:  Patient Care Team:  Abilio Bhardwaj MD as PCP - General  Abilio Bhardwaj MD as PCP - Claims Attributed        Smoking Status:  History   Smoking Status   • Never Smoker   Smokeless Tobacco   • Never Used       Alcohol Consumption:  History   Alcohol Use No       Depression Screen:   PHQ-2/PHQ-9 Depression Screening 2/20/2017   Little interest or pleasure in doing things 0   Feeling down, depressed, or hopeless 0   Trouble falling or staying asleep, or sleeping too much 0   Feeling tired or having little energy 0   Poor appetite or overeating 0   Feeling bad about yourself - or that you are a failure or have let yourself or your family down 0   Trouble concentrating on things, such as reading the newspaper or watching television 0   Moving or speaking so slowly that other people could have noticed. Or the opposite - being so fidgety or restless that you have been moving around a lot more than usual 0   Thoughts that you would be better off dead, or of hurting yourself in some way 0   Total Score 0   If you checked off any problems, how difficult have these problems made it for you to do your work, take care of things at home, or get along with other people? Not difficult at all       Health Habits and Functional and Cognitive Screening:  No flowsheet data found.        Does the patient have evidence of cognitive impairment? No    Asiprin use counseling: Taking ASA appropriately as indicated      Recent Lab Results:    Visual Acuity:  No exam data present    Age-appropriate Screening Schedule:  Refer to the list below for future screening recommendations based on patient's age, sex and/or medical conditions. Orders for these recommended tests are  listed in the plan section. The patient has been provided with a written plan.    Health Maintenance   Topic Date Due   • INFLUENZA VACCINE  08/01/2018   • DXA SCAN  12/28/2018   • TDAP/TD VACCINES (3 - Td) 11/24/2027   • PNEUMOCOCCAL VACCINES (65+ LOW/MEDIUM RISK)  Completed   • ZOSTER VACCINE  Completed   • LIPID PANEL  Excluded        Subjective   History of Present Illness    Tiffanie Arroyo is a 89 y.o. female who presents for an Annual Wellness Visit.    The following portions of the patient's history were reviewed and updated as appropriate: allergies, current medications, past family history, past medical history, past social history, past surgical history and problem list.    Outpatient Medications Prior to Visit   Medication Sig Dispense Refill   • ALPHAGAN P 0.1 % solution ophthalmic solution Administer 1 drop to both eyes 3 (Three) Times a Day. 5 mL 5   • aspirin 81 MG EC tablet Take 1 tablet by mouth daily. (Patient taking differently: Take 81 mg by mouth Every Night.) 30 tablet 11   • carvedilol (COREG) 6.25 MG tablet TAKE 1 TABLET  TWICE DAILY WITH MEALS 60 tablet 10   • docusate sodium (DOK) 100 MG capsule Take 1 capsule by mouth 2 (Two) Times a Day. 60 capsule 5   • furosemide (LASIX) 20 MG tablet TAKE 2 TABLETS EACH MORNING. 60 tablet 10   • HYDROcodone-acetaminophen (NORCO) 5-325 MG per tablet Take 1 tablet by mouth Every 8 (Eight) Hours As Needed (back pain). 90 tablet 0   • latanoprost (XALATAN) 0.005 % ophthalmic solution Administer 1 drop to both eyes Every Night. 2.5 mL 1   • levothyroxine (SYNTHROID, LEVOTHROID) 112 MCG tablet TAKE 1 TAB DAILY FOR THYROID, ON EMPTY STOMACH -- EITHER 1 HOUR BEFORE EATING OR 2 HOURS AFTER 30 tablet 7   • lisinopril (PRINIVIL,ZESTRIL) 10 MG tablet Take 1 tablet by mouth Daily. 90 tablet 2   • methocarbamol (ROBAXIN) 500 MG tablet Take 1 tablet by mouth 3 (Three) Times a Day As Needed for Muscle Spasms. 30 tablet 1   • ondansetron (ZOFRAN) 4 MG tablet TAKE 1  TAB EVERY 8 HRS AS NEEDED FOR NAUSEA AND VOMITING 20 tablet 4   • rOPINIRole (REQUIP) 0.5 MG tablet Take 1 tablet by mouth Every Night. 90 tablet 3   • sertraline (ZOLOFT) 50 MG tablet TAKE 1 TABLET  DAILY 30 tablet 3   • tiZANidine (ZANAFLEX) 4 MG tablet Take 1 tablet by mouth 3 (Three) Times a Day. 90 tablet 1     No facility-administered medications prior to visit.        Patient Active Problem List   Diagnosis   • Friction burn of skin   • Abrasion of right hand   • Acute congestive heart failure   • Age-related physical debility   • Amblyopia   • Artificial lens present   • S/P CABG (coronary artery bypass graft)   • Degenerative joint disease involving multiple joints   • Dehydration   • Depressive disorder   • Dyslipidemia   • Ecchymosis   • Generalized anxiety disorder   • Glaucoma   • Heart valve disorder   • Malignant neoplasm of skin   • Nausea   • Nonrheumatic mitral valve insufficiency   • Nonrheumatic tricuspid (valve) stenosis   • Nuclear senile cataract   • Osteoporosis   • Secondary pulmonary hypertension   • Peripheral edema   • Primary open angle glaucoma   • Repeated falls   • Restless legs   • Subdural hematoma   • Noninfected skin tear of right leg   • Atrial flutter   • Cataract   • Pseudophakia   • Rectal bleed   • Chronic midline low back pain without sciatica   • Blood in stool   • Primary open angle glaucoma of both eyes, moderate stage   • Mixed hyperlipidemia   • Primary open angle glaucoma of both eyes, severe stage   • Chronic pain of left knee   • Fall   • Hypothyroidism   • Closed fracture of acromial end of right clavicle   • Closed fracture of multiple ribs of right side   • Hypertension   • Neck pain       Advance Care Planning:  has an advance directive - a copy has been provided and is in file    Identification of Risk Factors:  Risk factors include: unhealthy diet, increased fall risk and polypharmacy.    Review of Systems    Compared to one year ago, the patient feels her  "physical health is better.  Compared to one year ago, the patient feels her mental health is better.    Objective     Physical Exam    Vitals:    04/12/18 1323   BP: 110/60   Pulse: 57   SpO2: 98%   Weight: 53.1 kg (117 lb)   Height: 165.1 cm (65\")   PainSc:   7   PainLoc: Leg       Patient's Body mass index is 19.47 kg/m². BMI is above normal parameters. Follow-up plan includes:  no follow-up required.      Assessment/Plan   Patient Self-Management and Personalized Health Advice  The patient has been provided with information about: diet and preventive services including:   · Advance directive.    Visit Diagnoses:  No diagnosis found.    No orders of the defined types were placed in this encounter.      Outpatient Encounter Prescriptions as of 4/12/2018   Medication Sig Dispense Refill   • ALPHAGAN P 0.1 % solution ophthalmic solution Administer 1 drop to both eyes 3 (Three) Times a Day. 5 mL 5   • aspirin 81 MG EC tablet Take 1 tablet by mouth daily. (Patient taking differently: Take 81 mg by mouth Every Night.) 30 tablet 11   • carvedilol (COREG) 6.25 MG tablet TAKE 1 TABLET  TWICE DAILY WITH MEALS 60 tablet 10   • docusate sodium (DOK) 100 MG capsule Take 1 capsule by mouth 2 (Two) Times a Day. 60 capsule 5   • furosemide (LASIX) 20 MG tablet TAKE 2 TABLETS EACH MORNING. 60 tablet 10   • HYDROcodone-acetaminophen (NORCO) 5-325 MG per tablet Take 1 tablet by mouth Every 8 (Eight) Hours As Needed (back pain). 90 tablet 0   • latanoprost (XALATAN) 0.005 % ophthalmic solution Administer 1 drop to both eyes Every Night. 2.5 mL 1   • levothyroxine (SYNTHROID, LEVOTHROID) 112 MCG tablet TAKE 1 TAB DAILY FOR THYROID, ON EMPTY STOMACH -- EITHER 1 HOUR BEFORE EATING OR 2 HOURS AFTER 30 tablet 7   • lisinopril (PRINIVIL,ZESTRIL) 10 MG tablet Take 1 tablet by mouth Daily. 90 tablet 2   • methocarbamol (ROBAXIN) 500 MG tablet Take 1 tablet by mouth 3 (Three) Times a Day As Needed for Muscle Spasms. 30 tablet 1   • " ondansetron (ZOFRAN) 4 MG tablet TAKE 1 TAB EVERY 8 HRS AS NEEDED FOR NAUSEA AND VOMITING 20 tablet 4   • rOPINIRole (REQUIP) 0.5 MG tablet Take 1 tablet by mouth Every Night. 90 tablet 3   • sertraline (ZOLOFT) 50 MG tablet TAKE 1 TABLET  DAILY 30 tablet 3   • tiZANidine (ZANAFLEX) 4 MG tablet Take 1 tablet by mouth 3 (Three) Times a Day. 90 tablet 1     No facility-administered encounter medications on file as of 4/12/2018.        Reviewed use of high risk medication in the elderly: yes  Reviewed for potential of harmful drug interactions in the elderly: yes    Follow Up:  No Follow-up on file.     An After Visit Summary and PPPS with all of these plans were given to the patient.

## 2018-04-12 NOTE — PROGRESS NOTES
Subjective   Tiffanie Arroyo is a 89 y.o. female.     Knee Pain    The incident occurred more than 1 week ago. The pain is present in the left knee and right knee. The pain is at a severity of 8/10.   Back Pain   This is a chronic problem. The current episode started more than 1 year ago. The problem is unchanged. The pain is present in the lumbar spine and sacro-iliac. Quality: sharp minly. The pain does not radiate. The pain is at a severity of 8/10. The pain is moderate. The pain is the same all the time. The symptoms are aggravated by sitting and lying down. Stiffness is present in the morning. Pertinent negatives include no bladder incontinence, bowel incontinence or dysuria.        The following portions of the patient's history were reviewed and updated as appropriate: allergies, current medications, past family history, past medical history, past social history, past surgical history and problem list.    Review of Systems   Gastrointestinal: Negative for bowel incontinence.   Genitourinary: Negative for bladder incontinence and dysuria.   Musculoskeletal: Positive for back pain.       Objective   Physical Exam   Constitutional: She is oriented to person, place, and time. She appears well-developed and well-nourished. No distress.   HENT:   Head: Normocephalic and atraumatic.   Musculoskeletal:        Left knee: She exhibits decreased range of motion and swelling. Tenderness found.        Lumbar back: She exhibits decreased range of motion, tenderness and pain. She exhibits no bony tenderness, no swelling, no edema, no deformity, no laceration and no spasm.     Vascular Status -  Her right foot exhibits no edema. Her left foot exhibits no edema.  Neurological: She is alert and oriented to person, place, and time.   Reflex Scores:       Patellar reflexes are 2+ on the right side and 2+ on the left side.  Skin: Skin is warm and dry. She is not diaphoretic.   Psychiatric: She has a normal mood and affect. Her  behavior is normal. Judgment and thought content normal.   Nursing note and vitals reviewed.      Assessment/Plan   Problems Addressed this Visit        Nervous and Auditory    Chronic midline low back pain without sciatica - Primary      Other Visit Diagnoses     Initial Medicare annual wellness visit

## 2018-05-10 NOTE — PROGRESS NOTES
Subjective   Tiffanie Arroyo is a 89 y.o. female.     Back Pain   This is a chronic problem. The current episode started more than 1 year ago. The problem is unchanged. The pain is present in the lumbar spine and sacro-iliac. Quality: sharp minly. The pain does not radiate. The pain is at a severity of 8/10. The pain is moderate. The pain is the same all the time. The symptoms are aggravated by sitting and lying down. Stiffness is present in the morning. Pertinent negatives include no bladder incontinence, bowel incontinence or dysuria.   Neck Pain    This is a chronic problem. The current episode started more than 1 year ago. The problem occurs intermittently. The problem has been rapidly worsening. The pain is associated with a sleep position. The pain is present in the occipital region.   Knee Pain    The incident occurred more than 1 week ago. The pain is present in the left knee and right knee. The pain is at a severity of 8/10.        The following portions of the patient's history were reviewed and updated as appropriate: allergies, current medications, past family history, past medical history, past social history, past surgical history and problem list.    Review of Systems   Gastrointestinal: Negative for bowel incontinence.   Genitourinary: Negative for bladder incontinence and dysuria.   Musculoskeletal: Positive for back pain and neck pain.       Objective   Physical Exam   Constitutional: She is oriented to person, place, and time. She appears well-developed and well-nourished. No distress.   HENT:   Head: Normocephalic and atraumatic.   Musculoskeletal:        Left knee: She exhibits decreased range of motion and swelling. Tenderness found.        Cervical back: She exhibits decreased range of motion, tenderness and pain.        Lumbar back: She exhibits decreased range of motion, tenderness and pain. She exhibits no bony tenderness, no swelling, no edema, no deformity, no laceration and no spasm.      Vascular Status -  Her right foot exhibits no edema. Her left foot exhibits no edema.  Neurological: She is alert and oriented to person, place, and time.   Reflex Scores:       Patellar reflexes are 2+ on the right side and 2+ on the left side.  Skin: Skin is warm and dry. She is not diaphoretic.   Psychiatric: She has a normal mood and affect. Her behavior is normal. Judgment and thought content normal.   Nursing note and vitals reviewed.      Assessment/Plan   Problems Addressed this Visit        Nervous and Auditory    Chronic midline low back pain without sciatica - Primary       Musculoskeletal and Integument    Degenerative joint disease involving multiple joints (Chronic)    Relevant Medications    triamcinolone acetonide (KENALOG-40) injection 80 mg (Start on 5/10/2018  3:15 PM)      Other Visit Diagnoses    None.

## 2018-06-11 NOTE — PROGRESS NOTES
Subjective   Tiffanie Arroyo is a 89 y.o. female.     Back Pain   This is a chronic problem. The current episode started more than 1 year ago. The problem is unchanged. The pain is present in the lumbar spine and sacro-iliac. Quality: sharp minly. The pain does not radiate. The pain is at a severity of 8/10. The pain is moderate. The pain is the same all the time. The symptoms are aggravated by sitting and lying down. Stiffness is present in the morning. Pertinent negatives include no bladder incontinence, bowel incontinence or dysuria.   Knee Pain    The incident occurred more than 1 week ago. The pain is present in the left knee and right knee. The pain is at a severity of 8/10.        The following portions of the patient's history were reviewed and updated as appropriate: allergies, current medications, past family history, past medical history, past social history, past surgical history and problem list.    Review of Systems   Gastrointestinal: Negative for bowel incontinence.   Genitourinary: Negative for bladder incontinence and dysuria.   Musculoskeletal: Positive for back pain.       Objective   Physical Exam   Constitutional: She is oriented to person, place, and time. She appears well-developed and well-nourished. No distress.   HENT:   Head: Normocephalic and atraumatic.   Musculoskeletal:        Left knee: She exhibits decreased range of motion and swelling. Tenderness found.        Lumbar back: She exhibits decreased range of motion, tenderness and pain. She exhibits no bony tenderness, no swelling, no edema, no deformity, no laceration and no spasm.     Vascular Status -  Her right foot exhibits no edema. Her left foot exhibits no edema.  Neurological: She is alert and oriented to person, place, and time.   Reflex Scores:       Patellar reflexes are 2+ on the right side and 2+ on the left side.  Skin: Skin is warm and dry. She is not diaphoretic.   Psychiatric: She has a normal mood and affect. Her  behavior is normal. Judgment and thought content normal.   Nursing note and vitals reviewed.      Assessment/Plan   Problems Addressed this Visit        Nervous and Auditory    Chronic midline low back pain without sciatica - Primary

## 2018-06-14 NOTE — PROGRESS NOTES
Tiffanie Arroyo  89 y.o. female    06/14/2018  1. Atrial flutter, unspecified type    2. Essential hypertension    3. Mixed hyperlipidemia    4. S/P CABG (coronary artery bypass graft)        History of Present Illness    Mrs. Arroyo is here for follow-up of her above stated problems.  He denied any chest pain or shortness of breath and the predominant complaint relates to pain in the bones on the back and shoulder.  Blood pressure was in the normal range.  No signs of congestive heart failure was noted.  Echocardiogram in November 2017 showed  · Left ventricular wall thickness is consistent with moderate-to-severe concentric hypertrophy.  · Left ventricular systolic function is normal. Estimated EF = 60%.  · Left ventricular diastolic dysfunction (grade I) consistent with impaired relaxation.  · Left atrial cavity size is mildly dilated.  · Mild aortic valve regurgitation is present.  · Moderate mitral valve regurgitation is present  · Moderate to severe tricuspid valve regurgitation is present.  · Mild pulmonic valve regurgitation is present.      SUBJECTIVE    Allergies   Allergen Reactions   • Codeine Nausea Only and GI Intolerance   • Penicillins Hives   • Sulfa Antibiotics Other (See Comments)     Occurred a long time ago - pt cannot remember reaction.         Past Medical History:   Diagnosis Date   • Acute congestive heart failure    • Atrial flutter    • Coronary arteriosclerosis    • Degenerative joint disease involving multiple joints    • Depressive disorder    • Generalized anxiety disorder    • Glaucoma    • Nonrheumatic mitral valve insufficiency    • Nonrheumatic tricuspid valve disorder    • Osteoporosis    • Repeated falls    • Subdural hematoma          Past Surgical History:   Procedure Laterality Date   • CORONARY ARTERY BYPASS GRAFT     • EXPLORATORY LAPAROTOMY     • HIP ARTHROPLASTY Left    • NECK LESION BIOPSY     • THYROIDECTOMY           Family History   Problem Relation Age of Onset   •  Hypertension Father          Social History     Social History   • Marital status:      Spouse name: N/A   • Number of children: N/A   • Years of education: N/A     Occupational History   • Not on file.     Social History Main Topics   • Smoking status: Never Smoker   • Smokeless tobacco: Never Used   • Alcohol use No   • Drug use: No   • Sexual activity: Not Currently     Other Topics Concern   • Not on file     Social History Narrative   • No narrative on file         Current Outpatient Prescriptions   Medication Sig Dispense Refill   • ALPHAGAN P 0.1 % solution ophthalmic solution INSTILL 1 DROP IN BOTH EYES THREE TIMES DAILY 5 mL 1   • aspirin 81 MG EC tablet Take 1 tablet by mouth daily. (Patient taking differently: Take 81 mg by mouth Every Night.) 30 tablet 11   • carvedilol (COREG) 6.25 MG tablet TAKE 1 TABLET  TWICE DAILY WITH MEALS 60 tablet 5   • docusate sodium (DOK) 100 MG capsule Take 1 capsule by mouth 2 (Two) Times a Day. 60 capsule 5   • furosemide (LASIX) 20 MG tablet TAKE 2 TABLETS EACH MORNING. 60 tablet 10   • HYDROcodone-acetaminophen (NORCO) 5-325 MG per tablet Take 1 tablet by mouth Every 8 (Eight) Hours As Needed (back pain). 90 tablet 0   • latanoprost (XALATAN) 0.005 % ophthalmic solution Administer 1 drop to both eyes Every Night. 2.5 mL 1   • levothyroxine (SYNTHROID, LEVOTHROID) 112 MCG tablet TAKE 1 TAB DAILY FOR THYROID, ON EMPTY STOMACH -- EITHER 1 HOUR BEFORE EATING OR 2 HOURS AFTER 30 tablet 7   • lisinopril (PRINIVIL,ZESTRIL) 10 MG tablet TAKE 1 TABLET DAILY FOR BLOOD PRESSURE. 30 tablet 1   • methocarbamol (ROBAXIN) 500 MG tablet Take 1 tablet by mouth 3 (Three) Times a Day As Needed for Muscle Spasms. 30 tablet 1   • mirtazapine (REMERON) 15 MG tablet Take 1 tablet by mouth Every Night. Stop zoloft 30 tablet 11   • ondansetron (ZOFRAN) 4 MG tablet Take 1 tablet by mouth Every 8 (Eight) Hours As Needed for Nausea or Vomiting. 20 tablet 4   • rOPINIRole (REQUIP) 0.5 MG  "tablet Take 1 tablet by mouth Every Night. 90 tablet 3   • sertraline (ZOLOFT) 50 MG tablet TAKE 1 TABLET  DAILY 30 tablet 2     No current facility-administered medications for this visit.          OBJECTIVE    /80   Pulse 76   Ht 165.1 cm (65\")   Wt 52.2 kg (115 lb)   BMI 19.14 kg/m²         Review of Systems     Constitutional:   recent weight loss, no change in exercise tolerance     HENT:  Denies any hearing loss, epistaxis, hoarseness, or difficulty speaking.     Eyes: Wears eyeglasses or contact lenses     Respiratory:  Denies dyspnea with exertion,no cough, wheezing, or hemoptysis.     Cardiovascular: Negative for palpations, chest pain,     Gastrointestinal:  Denies change in bowel habits. No ulcer disease, hematochezia, or melena.  Has loss of appetite    Endocrine: Negative for cold intolerance, heat intolerance, polydipsia, polyphagia and polyuria.     Genitourinary: Negative.      Musculoskeletal: DJD    Skin: Bruising present      Physical Exam     Constitutional: Cooperative, alert and oriented,  in no acute distress.     HENT:   Head: Normocephalic, normal hair patterns, no masses or tenderness.  Ears, Nose, and Throat: No gross abnormalities. No pallor or cyanosis. Eyes: EOMS intact, PERRL, conjunctivae and lids unremarkable. Fundoscopic exam and visual fields not performed.   Neck: No palpable masses or adenopathy, no thyromegaly, no JVD, carotid pulses are full and equal bilaterally and without  Bruits.     Cardiovascular: Irregular rhythm, S1 and S2 normal, no S3 or S4. 3/6 systolic murmur. No gallops, or rubs detected.     Pulmonary/Chest: Chest: normal symmetry,  normal respiratory excursion, no intercostal retraction, no use of accessory muscles.            Pulmonary: Normal breath sounds. No rales or ronchi.    Abdominal: Abdomen soft, bowel sounds normoactive, no masses, no hepatosplenomegaly, non-tender, no bruits.     Musculoskeletal: No deformities, clubbing, cyanosis, erythema, " or edema observed.       Procedures      Lab Results   Component Value Date    WBC 9.81 (H) 11/24/2017    HGB 13.0 11/24/2017    HCT 38.7 11/24/2017    MCV 92.4 11/24/2017     11/24/2017     Lab Results   Component Value Date    GLUCOSE 128 (H) 11/24/2017    BUN 23 (H) 11/24/2017    CREATININE 0.91 11/24/2017    EGFRIFNONA 58 (L) 11/24/2017    BCR 25.3 (H) 11/24/2017    CO2 29.0 11/24/2017    CALCIUM 8.8 02/07/2018    ALBUMIN 3.90 11/24/2017    LABIL2 1.1 11/24/2017    AST 43 (H) 11/24/2017    ALT 29 11/24/2017     Lab Results   Component Value Date    CHOL 141 02/20/2017     Lab Results   Component Value Date    TRIG 65 02/20/2017    TRIG 110 02/19/2015    TRIG 128 06/25/2014     Lab Results   Component Value Date    HDL 50 (L) 02/20/2017    HDL 61 02/19/2015    HDL 60 06/25/2014     No components found for: LDLCALC  Lab Results   Component Value Date    LDL 66 02/20/2017    LDL 42 02/19/2015    LDL 64 06/25/2014     No results found for: HDLLDLRATIO  No components found for: CHOLHDL  No results found for: HGBA1C  Lab Results   Component Value Date    TSH <0.020 (L) 09/13/2017           ASSESSMENT AND PLAN  Mrs. Arroyo is stable with regards to her heart with no definite evidence of angina or congestive heart failure.  Antiplatelet therapy with aspirin, antihypertensive therapy with Coreg, lisinopril, low-dose diuretics have been continued.    Addendum:  I understand that the patient is being considered for cataract surgery on 9/5/2018.  She will be low risk for adelfo-procedure cardiac events.  She could go off aspirin for a period of 5 days prior to surgery and medication may be restarted postsurgery when appropriate.      Tiffanie was seen today for follow-up.    Diagnoses and all orders for this visit:    Atrial flutter, unspecified type    Essential hypertension    Mixed hyperlipidemia    S/P CABG (coronary artery bypass graft)        Jose Chao MD  6/14/2018  2:10 PM

## 2018-07-14 NOTE — ED PROVIDER NOTES
Subjective   History of Present Illness  89-year-old female with a history of CHF, atrial flutter, coronary artery disease presents to the emergency department because of left lower extremity edema with leaking of fluid.  Had similar symptoms in the past related to CHF.  She also has some pain in this leg.  No shortness of breath or difficulty breathing.  Denies any chest pain.  No fevers or chills.  Is here with her family member.  Review of Systems   Constitutional: Negative for chills and fever.   HENT: Negative for rhinorrhea, sinus pressure and sneezing.    Eyes: Negative for pain and redness.   Respiratory: Negative for cough, chest tightness and shortness of breath.    Gastrointestinal: Negative for abdominal pain, diarrhea, nausea and vomiting.   Genitourinary: Negative for dysuria, flank pain, menstrual problem, pelvic pain, vaginal bleeding, vaginal discharge and vaginal pain.   Musculoskeletal:        Negative except for history of present illness   Skin: Negative for rash.   Neurological: Negative for dizziness, syncope, weakness, numbness and headaches.   Hematological: Negative.    Psychiatric/Behavioral: Negative for self-injury and suicidal ideas.       Past Medical History:   Diagnosis Date   • Acute congestive heart failure (CMS/HCC)    • Atrial flutter (CMS/HCC)    • Coronary arteriosclerosis    • Degenerative joint disease involving multiple joints    • Depressive disorder    • Generalized anxiety disorder    • Glaucoma    • Nonrheumatic mitral valve insufficiency    • Nonrheumatic tricuspid valve disorder    • Osteoporosis    • Repeated falls    • Subdural hematoma (CMS/HCC)        Allergies   Allergen Reactions   • Codeine Nausea Only and GI Intolerance   • Penicillins Hives   • Sulfa Antibiotics Other (See Comments)     Occurred a long time ago - pt cannot remember reaction.       Past Surgical History:   Procedure Laterality Date   • CORONARY ARTERY BYPASS GRAFT     • EXPLORATORY LAPAROTOMY      • HIP ARTHROPLASTY Left    • NECK LESION BIOPSY     • THYROIDECTOMY         Family History   Problem Relation Age of Onset   • Hypertension Father        Social History     Social History   • Marital status:      Social History Main Topics   • Smoking status: Never Smoker   • Smokeless tobacco: Never Used   • Alcohol use No   • Drug use: No   • Sexual activity: Not Currently     Other Topics Concern   • Not on file           Objective   Physical Exam   Constitutional: She is oriented to person, place, and time. She appears well-developed and well-nourished.   HENT:   Head: Normocephalic.   Mouth/Throat: Oropharynx is clear and moist.   Eyes: EOM are normal. Pupils are equal, round, and reactive to light.   Neck: Normal range of motion. Neck supple.   Cardiovascular: Normal rate, regular rhythm and normal heart sounds.    Pulmonary/Chest: Effort normal and breath sounds normal.   Abdominal: Soft. Bowel sounds are normal. She exhibits no distension. There is no tenderness. There is no guarding.   Musculoskeletal:   Patient has 2+ pitting edema of the left lower extremity with some pain in the calf   Neurological: She is alert and oriented to person, place, and time.   Skin: Skin is warm and dry. Capillary refill takes less than 2 seconds.   Psychiatric: She has a normal mood and affect.   Nursing note and vitals reviewed.      ECG 12 Lead    Date/Time: 7/14/2018 1:43 PM  Performed by: LIZZ ACOSTA  Authorized by: LIZZ ACOSTA   Interpreted by physician  Comments: Sinus rate of 76 with some sinus arrhythmia.  There are some ST-T wave changes that are present on previous EKG that are unchanged today.  Patient premature complex.  No STEMI.                 ED Course      Labs Reviewed   COMPREHENSIVE METABOLIC PANEL - Abnormal; Notable for the following:        Result Value    BUN 25 (*)     Creatinine 1.11 (*)     Globulin 3.8 (*)     All other components within normal limits     Narrative:     The MDRD GFR formula is only valid for adults with stable renal function between ages 18 and 70.   D-DIMER, QUANTITATIVE - Abnormal; Notable for the following:     D-Dimer, Quantitative 866 (*)     All other components within normal limits    Narrative:     Dimer values <500 ng/ml FEU are FDA approved as aid in diagnosis of deep venous thrombosis and pulmonary embolism.  This test should not be used in an exclusion strategy with pretest probability alone.    A recent guideline regarding diagnosis for pulmonary thromboembolism recommends an adjusted exclusion criterion of age x 10 ng/ml FEU for patients >50 years of age ([employer] Intern Med 2015; 163: 701-711).   PHOSPHORUS - Abnormal; Notable for the following:     Phosphorus 4.7 (*)     All other components within normal limits   CBC WITH AUTO DIFFERENTIAL - Abnormal; Notable for the following:     WBC 11.16 (*)     MCV 99.0 (*)     RDW 16.5 (*)     RDW-SD 59.0 (*)     Immature Grans % 1.1 (*)     Immature Grans, Absolute 0.12 (*)     All other components within normal limits   PROTIME-INR - Normal    Narrative:     Therapeutic range for most indications is 2.0-3.0 INR,  or 2.5-3.5 for mechanical heart valves.   APTT - Normal    Narrative:     The recommended Heparin therapeutic range is 68-97 seconds.   BNP (IN-HOUSE) - Normal   MAGNESIUM - Normal   RAINBOW DRAW    Narrative:     The following orders were created for panel order [facility] Draw.  Procedure                               Abnormality         Status                     ---------                               -----------         ------                     Light Blue Top[[id number]]                                   Final result               Green Top (Gel)[[id number]]                                  Final result               Lavender Top[[id number]]                                     Final result               Gold Top - SST[[id number]]                                   Final result                  Please view results for these tests on the individual orders.   CBC AND DIFFERENTIAL    Narrative:     The following orders were created for panel order CBC & Differential.  Procedure                               Abnormality         Status                     ---------                               -----------         ------                     CBC Auto Differential[[id number]]        Abnormal            Final result                 Please view results for these tests on the individual orders.   LIGHT BLUE TOP   GREEN TOP   LAVENDER TOP   GOLD TOP - SST     US Venous Doppler Lower Extremity Left (duplex)   Final Result   Negative for DVT left larger knee.      3.6 cm maximum dimension hypoechoic structure/collection layering   over the technologist at the distal anterior thigh. This is in   location separate from the patient's current complaint of   discomfort. Please correlate clinically for suprasellar joint   effusion.      Electronically signed by:  Adan Martinez MD  7/14/2018 1:31 PM   CDT Workstation: [id number]      XR Chest 1 View   Final Result   Chronic lung background without superimposed consolidation or   acute pleural finding.      Evidence of chronic right clavicle and chronic multilevel rib   fractures on the right.      Similar cardiac enlargement without findings of overt failure.      Electronically signed by:  Adan Martinez MD  7/14/2018 11:50 AM   CDT Workstation: [id number]                  MDM  Number of Diagnoses or Management Options  Congestive heart failure, unspecified congestive heart failure chronicity, unspecified congestive heart failure type (CMS/HCC):   Unilateral edema of lower extremity:   Diagnosis management comments: Believe the patient's symptoms are likely secondary to mild CHF exacerbation.  Her d-dimer was slightly elevated.  This was sent because the patient had left calf pain ice for the left leg swelling.  Ultrasound did not show any DVT.  She did have an effusion likely  suprapatellar in nature.  Oblivious effusion is secondary to CHF as well as the edema in her lower extremities.  We will have her increase her Lasix from 40 mg in the morning to 40 mg twice a day until she follows up with her primary care doctor next week.    The patient has chronically elevated blood pressures are poorly controlled.       Amount and/or Complexity of Data Reviewed  Clinical lab tests: reviewed  Tests in the radiology section of CPT®: reviewed  Tests in the medicine section of CPT®: reviewed  Independent visualization of images, tracings, or specimens: yes          Final diagnoses:   Unilateral edema of lower extremity   Congestive heart failure, unspecified congestive heart failure chronicity, unspecified congestive heart failure type (CMS/[facility])            Melvin Child MD  07/14/18 [id number]       Melvin Child MD  07/14/18 [id number]

## 2018-07-17 NOTE — PATIENT INSTRUCTIONS
BMI for Adults  Body mass index (BMI) is a number that is calculated from a person's weight and height. In most adults, the number is used to find how much of an adult's weight is made up of fat. BMI is not as accurate as a direct measure of body fat.  How is BMI calculated?  BMI is calculated by dividing weight in kilograms by height in meters squared. It can also be calculated by dividing weight in pounds by height in inches squared, then multiplying the resulting number by 703. Charts are available to help you find your BMI quickly and easily without doing this calculation.  How is BMI interpreted?  Health care professionals use BMI charts to identify whether an adult is underweight, at a normal weight, or overweight based on the following guidelines:  · Underweight: BMI less than 18.5.  · Normal weight: BMI between 18.5 and 24.9.  · Overweight: BMI between 25 and 29.9.  · Obese: BMI of 30 and above.    BMI is usually interpreted the same for males and females.  Weight includes both fat and muscle, so someone with a muscular build, such as an athlete, may have a BMI that is higher than 24.9. In cases like these, BMI may not accurately depict body fat. To determine if excess body fat is the cause of a BMI of 25 or higher, further assessments may need to be done by a health care provider.  Why is BMI a useful tool?  BMI is used to identify a possible weight problem that may be related to a medical problem or may increase the risk for medical problems. BMI can also be used to promote changes to reach a healthy weight.  This information is not intended to replace advice given to you by your health care provider. Make sure you discuss any questions you have with your health care provider.  Document Released: 08/29/2005 Document Revised: 04/27/2017 Document Reviewed: 05/15/2015  [employer] Interactive Patient Education © 2018 [employer]  MyPlate from [employer]  The general, healthful diet is based on the 2010 Dietary  Guidelines for Americans. The amount of food you need to eat from each food group depends on your age, sex, and level of physical activity and can be individualized by a dietitian. Go to ChooseMyPlate.gov for more information.  What do I need to know about the MyPlate plan?  · Enjoy your food, but eat less.  · Avoid oversized portions.  ? ½ of your plate should include fruits and vegetables.  ? ¼ of your plate should be grains.  ? ¼ of your plate should be protein.  Grains  · Make at least half of your grains whole grains.  · For a 2,000 calorie daily food plan, eat 6 oz every day.  · 1 oz is about 1 slice bread, 1 cup cereal, or ½ cup cooked rice, cereal, or pasta.  Vegetables  · Make half your plate fruits and vegetables.  · For a 2,000 calorie daily food plan, eat 2½ cups every day.  · 1 cup is about 1 cup raw or cooked vegetables or vegetable juice or 2 cups raw leafy greens.  Fruits  · Make half your plate fruits and vegetables.  · For a 2,000 calorie daily food plan, eat 2 cups every day.  · 1 cup is about 1 cup fruit or 100% fruit juice or ½ cup dried fruit.  Protein  · For a 2,000 calorie daily food plan, eat 5½ oz every day.  · 1 oz is about 1 oz meat, poultry, or fish, ¼ cup cooked beans, 1 egg, 1 Tbsp peanut butter, or ½ oz nuts or seeds.  Dairy  · Switch to fat-free or low-fat (1%) milk.  · For a 2,000 calorie daily food plan, eat 3 cups every day.  · 1 cup is about 1 cup milk or yogurt or soy milk (soy beverage), 1½ oz natural cheese, or 2 oz processed cheese.  Fats, Oils, and Empty Calories  · Only small amounts of oils are recommended.  · Empty calories are calories from solid fats or added sugars.  · Compare sodium in foods like soup, bread, and frozen meals. Choose the foods with lower numbers.  · Drink water instead of sugary drinks.  What foods can I eat?  Grains  Whole grains such as whole wheat, quinoa, millet, and bulgur. Bread, rolls, and pasta made from whole grains. Brown or wild rice. Hot  or cold cereals made from whole grains and without added sugar.  Vegetables  All fresh vegetables, especially fresh red, dark green, or orange vegetables. Peas and beans. Low-sodium frozen or canned vegetables prepared without added salt. Low-sodium vegetable juices.  Fruits  All fresh, frozen, and dried fruits. Canned fruit packed in water or fruit juice without added sugar. Fruit juices without added sugar.  Meats and Other Protein Sources  Boiled, baked, or grilled lean meat trimmed of fat. Skinless poultry. Fresh seafood and shellfish. Canned seafood packed in water. Unsalted nuts and unsalted nut butters. Tofu. Dried beans and pea. Eggs.  Dairy  Low-fat or fat-free milk, yogurt, and cheeses.  Sweets and Desserts  Frozen desserts made from low-fat milk.  Fats and Oils  Olive, peanut, and canola oils and margarine. Salad dressing and mayonnaise made from these oils.  Other  Soups and casseroles made from allowed ingredients and without added fat or salt.  The items listed above may not be a complete list of recommended foods or beverages. Contact your dietitian for more options.  What foods are not recommended?  Grains  Sweetened, low-fiber cereals. Packaged baked goods. Snack crackers and chips. Cheese crackers, butter crackers, and biscuits. Frozen waffles, sweet breads, doughnuts, pastries, packaged baking mixes, pancakes, cakes, and cookies.  Vegetables  Regular canned or frozen vegetables or vegetables prepared with salt. Canned tomatoes. Canned tomato sauce. Fried vegetables. Vegetables in cream sauce or cheese sauce.  Fruits  Fruits packed in syrup or made with added sugar.  Meats and Other Protein Sources  Marbled or fatty meats such as ribs. Poultry with skin. Fried meats, poultry, eggs, or fish. Sausages, hot dogs, and deli meats such as pastrami, bologna, or salami.  Dairy  Whole milk, cream, cheeses made from whole milk, sour cream. Ice cream or yogurt made from whole milk or with added  sugar.  Beverages  For adults, no more than one alcoholic drink per day. Regular soft drinks or other sugary beverages. Juice drinks.  Sweets and Desserts  Sugary or fatty desserts, candy, and other sweets.  Fats and Oils  Solid shortening or partially hydrogenated oils. Solid margarine. Margarine that contains trans fats. Butter.  The items listed above may not be a complete list of foods and beverages to avoid. Contact your dietitian for more information.  This information is not intended to replace advice given to you by your health care provider. Make sure you discuss any questions you have with your health care provider.  Document Released: 01/06/2009 Document Revised: 05/25/2017 Document Reviewed: 11/26/2014  [employer] Interactive Patient Education © 2018 [employer]

## 2018-07-17 NOTE — TELEPHONE ENCOUNTER
Pharmacy states she needed additional refills.  I do not find anything she needs refills on at this time.

## 2018-07-17 NOTE — PROGRESS NOTES
Subjective   Tiffanie Arroyo is a 89 y.o. female.     Back Pain   This is a chronic problem. The current episode started more than 1 year ago. The problem is unchanged. The pain is present in the lumbar spine and sacro-iliac. Quality: sharp minly. The pain does not radiate. The pain is at a severity of 8/10. The pain is moderate. The pain is the same all the time. The symptoms are aggravated by sitting and lying down. Stiffness is present in the morning. Associated symptoms include leg pain. Pertinent negatives include no bladder incontinence, bowel incontinence or dysuria.   Leg Pain      Knee Pain    The incident occurred more than 1 week ago. The pain is present in the left knee and right knee. The pain is at a severity of 8/10.        The following portions of the patient's history were reviewed and updated as appropriate: allergies, current medications, past family history, past medical history, past social history, past surgical history and problem list.    Review of Systems   Gastrointestinal: Negative for bowel incontinence.   Genitourinary: Negative for bladder incontinence and dysuria.   Musculoskeletal: Positive for back pain.       Objective   Physical Exam   Constitutional: She is oriented to person, place, and time. She appears well-developed and well-nourished. No distress.   HENT:   Head: Normocephalic and atraumatic.   Musculoskeletal:        Left knee: She exhibits decreased range of motion and swelling. Tenderness found.        Lumbar back: She exhibits decreased range of motion, tenderness and pain. She exhibits no bony tenderness, no swelling, no edema, no deformity, no laceration and no spasm.     Vascular Status -  Her right foot exhibits no edema. Her left foot exhibits no edema.  Neurological: She is alert and oriented to person, place, and time.   Reflex Scores:       Patellar reflexes are 2+ on the right side and 2+ on the left side.  Skin: Skin is warm and dry. She is not diaphoretic.    Psychiatric: She has a normal mood and affect. Her behavior is normal. Judgment and thought content normal.   Nursing note and vitals reviewed.      Assessment/Plan   Problems Addressed this Visit        Nervous and Auditory    Chronic midline low back pain without sciatica       Musculoskeletal and Integument    Chronic pain of left knee - Primary

## 2018-08-10 NOTE — PATIENT INSTRUCTIONS
Denosumab injection  What is this medicine?  DENOSUMAB (den oh dillon mab) slows bone breakdown. Prolia is used to treat osteoporosis in women after menopause and in men. Xgeva is used to treat a high calcium level due to cancer and to prevent bone fractures and other bone problems caused by multiple myeloma or cancer bone metastases. Xgeva is also used to treat giant cell tumor of the bone.  This medicine may be used for other purposes; ask your health care provider or pharmacist if you have questions.  COMMON BRAND NAME(S): Prolia, XGEVA  What should I tell my health care provider before I take this medicine?  They need to know if you have any of these conditions:  -dental disease  -having surgery or tooth extraction  -infection  -kidney disease  -low levels of calcium or Vitamin D in the blood  -malnutrition  -on hemodialysis  -skin conditions or sensitivity  -thyroid or parathyroid disease  -an unusual reaction to denosumab, other medicines, foods, dyes, or preservatives  -pregnant or trying to get pregnant  -breast-feeding  How should I use this medicine?  This medicine is for injection under the skin. It is given by a health care professional in a hospital or clinic setting.  If you are getting Prolia, a special MedGuide will be given to you by the pharmacist with each prescription and refill. Be sure to read this information carefully each time.  For Prolia, talk to your pediatrician regarding the use of this medicine in children. Special care may be needed. For Xgeva, talk to your pediatrician regarding the use of this medicine in children. While this drug may be prescribed for children as young as 13 years for selected conditions, precautions do apply.  Overdosage: If you think you have taken too much of this medicine contact a poison control center or emergency room at once.  NOTE: This medicine is only for you. Do not share this medicine with others.  What if I miss a dose?  It is important not to miss your  dose. Call your doctor or health care professional if you are unable to keep an appointment.  What may interact with this medicine?  Do not take this medicine with any of the following medications:  -other medicines containing denosumab  This medicine may also interact with the following medications:  -medicines that lower your chance of fighting infection  -steroid medicines like prednisone or cortisone  This list may not describe all possible interactions. Give your health care provider a list of all the medicines, herbs, non-prescription drugs, or dietary supplements you use. Also tell them if you smoke, drink alcohol, or use illegal drugs. Some items may interact with your medicine.  What should I watch for while using this medicine?  Visit your doctor or health care professional for regular checks on your progress. Your doctor or health care professional may order blood tests and other tests to see how you are doing.  Call your doctor or health care professional for advice if you get a fever, chills or sore throat, or other symptoms of a cold or flu. Do not treat yourself. This drug may decrease your body's ability to fight infection. Try to avoid being around people who are sick.  You should make sure you get enough calcium and vitamin D while you are taking this medicine, unless your doctor tells you not to. Discuss the foods you eat and the vitamins you take with your health care professional.  See your dentist regularly. Brush and floss your teeth as directed. Before you have any dental work done, tell your dentist you are receiving this medicine.  Do not become pregnant while taking this medicine or for 5 months after stopping it. Talk with your doctor or health care professional about your birth control options while taking this medicine. Women should inform their doctor if they wish to become pregnant or think they might be pregnant. There is a potential for serious side effects to an unborn child. Talk  to your health care professional or pharmacist for more information.  What side effects may I notice from receiving this medicine?  Side effects that you should report to your doctor or health care professional as soon as possible:  -allergic reactions like skin rash, itching or hives, swelling of the face, lips, or tongue  -bone pain  -breathing problems  -dizziness  -jaw pain, especially after dental work  -redness, blistering, peeling of the skin  -signs and symptoms of infection like fever or chills; cough; sore throat; pain or trouble passing urine  -signs of low calcium like fast heartbeat, muscle cramps or muscle pain; pain, tingling, numbness in the hands or feet; seizures  -unusual bleeding or bruising  -unusually weak or tired  Side effects that usually do not require medical attention (report to your doctor or health care professional if they continue or are bothersome):  -constipation  -diarrhea  -headache  -joint pain  -loss of appetite  -muscle pain  -runny nose  -tiredness  -upset stomach  This list may not describe all possible side effects. Call your doctor for medical advice about side effects. You may report side effects to FDA at 0-179-FDA-2489.  Where should I keep my medicine?  This medicine is only given in a clinic, doctor's office, or other health care setting and will not be stored at home.  NOTE: This sheet is a summary. It may not cover all possible information. If you have questions about this medicine, talk to your doctor, pharmacist, or health care provider.  © 2018 [employer] (2018-01-09 19:17:21)

## 2018-08-16 NOTE — PROGRESS NOTES
Subjective   Tiffanie Arroyo is a 89 y.o. female.     Back Pain   This is a chronic problem. The current episode started more than 1 year ago. The problem is unchanged. The pain is present in the lumbar spine and sacro-iliac. Quality: sharp minly. The pain does not radiate. The pain is at a severity of 8/10. The pain is moderate. The pain is the same all the time. The symptoms are aggravated by sitting and lying down. Associated symptoms include a fever. Pertinent negatives include no bladder incontinence, bowel incontinence, chest pain or dysuria.   Knee Pain    The incident occurred more than 1 week ago. The pain is present in the left knee. The pain is at a severity of 8/10. Associated symptoms include a loss of motion and muscle weakness.   Hypertension   This is a chronic problem. The current episode started more than 1 year ago. The problem is unchanged. The problem is controlled. Pertinent negatives include no chest pain, orthopnea, palpitations, peripheral edema, PND or shortness of breath.   Hypothyroidism   This is a chronic problem. The current episode started more than 1 year ago. The problem has been unchanged. Associated symptoms include a fever. Pertinent negatives include no chest pain.   Depression   Visit Type: follow-up  Patient is not experiencing: depressed mood, insomnia, nervousness/anxiety, palpitations, shortness of breath, suicidal ideas, suicidal planning and thoughts of death.    Neck Pain    This is a new problem. The current episode started more than 1 month ago. The problem occurs constantly. The problem has been gradually worsening. The pain is associated with a fall. The pain is present in the occipital region. The quality of the pain is described as aching. The pain is mild. Nothing aggravates the symptoms. Associated symptoms include a fever. Pertinent negatives include no chest pain.            Review of Systems   Constitutional: Positive for fever and unexpected weight change.    Respiratory: Negative for shortness of breath.    Cardiovascular: Negative for chest pain, palpitations, orthopnea and PND.   Gastrointestinal: Negative for bowel incontinence.   Genitourinary: Negative for bladder incontinence and dysuria.   Musculoskeletal: Positive for back pain.   Psychiatric/Behavioral: Negative for suicidal ideas. The patient is not nervous/anxious and does not have insomnia.        Objective   Physical Exam   Constitutional: She is oriented to person, place, and time. She appears well-developed and well-nourished. No distress.   HENT:   Head: Normocephalic and atraumatic.   Cardiovascular: Normal rate.  Exam reveals no gallop and no friction rub.    Murmur heard.   Systolic murmur is present with a grade of 2/6   Pulmonary/Chest: Effort normal and breath sounds normal.   Abdominal: Bowel sounds are normal. There is no tenderness.   Musculoskeletal: She exhibits edema and tenderness.        Left knee: She exhibits decreased range of motion and swelling. Tenderness found.        Cervical back: She exhibits decreased range of motion, tenderness and spasm. She exhibits no bony tenderness.        Lumbar back: She exhibits tenderness and pain. She exhibits no bony tenderness, no swelling, no edema, no deformity, no laceration and no spasm.     Vascular Status -  Her right foot exhibits abnormal foot edema. Her left foot exhibits abnormal foot edema.  Neurological: She is alert and oriented to person, place, and time.   Skin: Skin is warm and dry. She is not diaphoretic.   Psychiatric: She has a normal mood and affect. Her speech is normal and behavior is normal. Judgment and thought content normal.   Nursing note and vitals reviewed.      Assessment/Plan   Problems Addressed this Visit        Cardiovascular and Mediastinum    Hypertension - Primary (Chronic)       Nervous and Auditory    Chronic midline low back pain without sciatica       Musculoskeletal and Integument    Chronic pain of left  knee      Other Visit Diagnoses     Anorexia

## 2018-08-28 PROBLEM — E83.51 HYPOCALCEMIA: Status: ACTIVE | Noted: 2018-01-01

## 2018-08-28 PROBLEM — N28.9 ACUTE RENAL INSUFFICIENCY: Status: ACTIVE | Noted: 2018-01-01

## 2018-09-01 NOTE — PROGRESS NOTES
NEPHROLOGY PROGRESS NOTE:    History of present illness:   Acute kidney injury, hypocalcemia.  Alert and comfortable.  Family at bedside. Feels well.  No abd pain.  Denies dysuria hematuria or frequency.    Patient Active Problem List   Diagnosis   • Friction burn of skin   • Abrasion of right hand   • Acute congestive heart failure (CMS/HCC)   • Age-related physical debility   • Amblyopia   • Artificial lens present   • S/P CABG (coronary artery bypass graft)   • Degenerative joint disease involving multiple joints   • Dehydration   • Depressive disorder   • Dyslipidemia   • Ecchymosis   • Generalized anxiety disorder   • Glaucoma   • Heart valve disorder   • Malignant neoplasm of skin   • Nausea   • Nonrheumatic mitral valve insufficiency   • Nonrheumatic tricuspid (valve) stenosis   • Nuclear senile cataract   • Osteoporosis   • Secondary pulmonary hypertension   • Peripheral edema   • Primary open angle glaucoma   • Repeated falls   • Restless legs   • Subdural hematoma (CMS/HCC)   • Noninfected skin tear of right leg   • Atrial flutter (CMS/HCC)   • Cataract   • Pseudophakia   • Rectal bleed   • Chronic midline low back pain without sciatica   • Blood in stool   • Primary open angle glaucoma of both eyes, moderate stage   • Mixed hyperlipidemia   • Primary open angle glaucoma of both eyes, severe stage   • Chronic pain of left knee   • Fall   • Hypothyroidism   • Closed fracture of acromial end of right clavicle   • Closed fracture of multiple ribs of right side   • Hypertension   • Neck pain   • Acute renal insufficiency   • Hypocalcemia       Medications:    aspirin 81 mg Oral Daily   calcium carbonate 2 tablet Oral TID   carvedilol 12.5 mg Oral BID With Meals   cholecalciferol 5,000 Units Oral Daily   docusate sodium 100 mg Oral BID   famotidine 40 mg Oral Daily   levoFLOXacin 250 mg Intravenous Q48H   levothyroxine 112 mcg Oral Q AM   megestrol 400 mg Oral BID   mirtazapine 15 mg Oral Nightly   rOPINIRole  0.5 mg Oral Q PM       Pharmacy to Dose LevoFLOXacin (LEVAQUIN)      Vitals:    09/01/18 0055 09/01/18 0159 09/01/18 0419 09/01/18 0816   BP:   126/60 158/60   BP Location:   Right arm Left arm   Patient Position:   Lying Lying   Pulse: 82 73 65 90   Resp: 16 18 18   Temp: 97.4 °F (36.3 °C)  97.8 °F (36.6 °C) 96.9 °F (36.1 °C)   TempSrc: Tympanic  Tympanic Axillary   SpO2: 98%  96% 98%   Weight:       Height:         No intake/output data recorded.  No intake/output data recorded.    On examination:  General:  Comfortable, no distress.  Elderly patient, feels better.  Family at bedside.   Skin: No skin rashes or mucosal bleeding   HEENT:  Pallor present, no icterus.     Neck:  No jugular venous distention, or thyroid enlargement.  Chest:  Clear.  No rales or wheezes audible.  Air entry appears equal bilaterally.  CVS: Heart sounds are regular, there is no pericardial rub or gallop.  Abdomen: Soft, nontender, normal bowel sounds, no organomegaly.  No rebound or guarding.  No bruit.  Extremities:  No significant edema of the lower extremities.  Musculoskeletal: No acute joint inflammation.  Knee swelling.  No acute inflammation.    Neuro:  No focal motor neurological deficits.  No asterixis.  Mentation:  Alert and oriented.  IV fluids leaking, IV site noted to be dislodged and nursing informed.     Past medical illness, family history, social history, medications, previous notes reviewed.       Laboratory results:      Recent Results (from the past 24 hour(s))   Basic Metabolic Panel    Collection Time: 09/01/18  6:28 AM   Result Value Ref Range    Glucose 91 60 - 100 mg/dL    BUN 18 7 - 21 mg/dL    Creatinine 0.86 0.50 - 1.00 mg/dL    Sodium 139 137 - 145 mmol/L    Potassium 4.1 3.5 - 5.1 mmol/L    Chloride 116 (H) 95 - 110 mmol/L    CO2 17.0 (L) 22.0 - 31.0 mmol/L    Calcium 8.9 8.4 - 10.2 mg/dL    eGFR Non African Amer 62 39 - 90 mL/min/1.73    BUN/Creatinine Ratio 20.9 7.0 - 25.0    Anion Gap 6.0 5.0 - 15.0  mmol/L   CBC Auto Differential    Collection Time: 09/01/18  6:28 AM   Result Value Ref Range    WBC 14.78 (H) 3.20 - 9.80 10*3/mm3    RBC 3.76 (L) 3.77 - 5.16 10*6/mm3    Hemoglobin 12.3 12.0 - 15.5 g/dL    Hematocrit 36.2 35.0 - 45.0 %    MCV 96.3 80.0 - 98.0 fL    MCH 32.7 26.5 - 34.0 pg    MCHC 34.0 31.4 - 36.0 g/dL    RDW 16.7 (H) 11.5 - 14.5 %    RDW-SD 57.7 (H) 36.4 - 46.3 fl    MPV 10.4 8.0 - 12.0 fL    Platelets 233 150 - 450 10*3/mm3   Manual Differential    Collection Time: 09/01/18  6:28 AM   Result Value Ref Range    Neutrophil % 75.0 37.0 - 80.0 %    Lymphocyte % 17.0 10.0 - 50.0 %    Monocyte % 6.0 0.0 - 12.0 %    Eosinophil % 2.0 0.0 - 7.0 %    Neutrophils Absolute 11.09 (H) 2.00 - 8.60 10*3/mm3    Lymphocytes Absolute 2.51 0.60 - 4.20 10*3/mm3    Monocytes Absolute 0.89 0.00 - 0.90 10*3/mm3    Eosinophils Absolute 0.30 0.00 - 0.70 10*3/mm3    Smudge Cells 2.0 /100 WBC    Ovalocytes Mod/2+ None Seen    WBC Morphology Normal Normal    Platelet Morphology Normal Normal   ]    Imaging Results (last 24 hours)     Procedure Component Value Units Date/Time    XR Chest PA & Lateral [[id number]] Collected:  09/01/18 0852     Updated:  09/01/18 0926    Narrative:       Chest PA and lateral         CLINICAL INDICATION: Shortness of breath    COMPARISON: August 28, 2018    FINDINGS: Cardiomegaly. Midline sternal sutures from prior  cardiac surgery. Radiopacities lower thoracic and upper lumbar  spine from kyphoplasty, vertebroplasty.    Scattered fibrotic changes in both lung fields.    Lungs otherwise clear. No dense consolidation is observed.  Allowing for differences in technique and phase of respiration no  significant changes since prior exam.      Impression:       CONCLUSION: No evidence of active disease.    Electronically signed by:  Ady Núñez MD  9/1/2018 9:25 AM CDT  Workstation: [id number]            ASSESSMENT:      Acute renal failure  Hypocalcemia  Anorexia, weight loss  History of diastolic  dysfunction  Osteoporosis  Leukocytosis, history of urinary tract infection  Vitamin D deficiency     PLAN:     Acute renal failure: Renal functions are improving.  Serum creatinine of 0.86, improved renal functions.  Good urine output.  Next    Hypocalcemia.  On calcium and vitamin D supplements.  Calcium levels are improved 8.9.  Decrease calcium carbonate 1 tablet 3 times a day.    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  We will get immunofluorescent studies.  Doubt further workup will help overall general condition, and likely will follow conservative measures.  Hemoglobin is stable at 12.3, calcium is improved.  I discussed with patient's daughter.    History of congestive heart failure, diastolic dysfunction.  IV fluids have been discontinued.  May need to start oral diuretics in the next few days.    Hypertension: Currently on carvedilol.  Angiotensin receptor blockers were held.      Renal functions are improved and stable.  Will follow intermittently.      MD WILMAN Escobedo/Transcription disclaimer:   Some parts of this note dictated by josé miguel, with translation of spoken language to printed text.

## 2018-09-01 NOTE — THERAPY TREATMENT NOTE
Acute Care - Occupational Therapy Treatment Note  [facility]     Patient Name: Tiffanie Arroyo  : 9/10/1926  MRN: [id number]  Today's Date: 2018  Onset of Illness/Injury or Date of Surgery: 18  Date of Referral to OT: 18  Referring Physician: Dr. Derrell Reyes    Admit Date: 2018       ICD-10-CM ICD-9-CM   1. Acute renal insufficiency N28.9 593.9   2. Hypocalcemia E83.51 275.41   3. Weakness R53.1 780.79   4. Impaired functional mobility, balance, gait, and endurance Z74.09 V49.89   5. Impaired mobility and activities of daily living Z74.09 799.89   6. Primary osteoarthritis involving multiple joints M15.0 715.09   7. Heart valve disorder I38 424.90   8. Repeated falls R29.6 781.99   9. Hypothyroidism due to Hashimoto's thyroiditis E03.8 244.8    E06.3 245.2     Patient Active Problem List   Diagnosis   • Friction burn of skin   • Abrasion of right hand   • Acute congestive heart failure (CMS/HCC)   • Age-related physical debility   • Amblyopia   • Artificial lens present   • S/P CABG (coronary artery bypass graft)   • Degenerative joint disease involving multiple joints   • Dehydration   • Depressive disorder   • Dyslipidemia   • Ecchymosis   • Generalized anxiety disorder   • Glaucoma   • Heart valve disorder   • Malignant neoplasm of skin   • Nausea   • Nonrheumatic mitral valve insufficiency   • Nonrheumatic tricuspid (valve) stenosis   • Nuclear senile cataract   • Osteoporosis   • Secondary pulmonary hypertension   • Peripheral edema   • Primary open angle glaucoma   • Repeated falls   • Restless legs   • Subdural hematoma (CMS/HCC)   • Noninfected skin tear of right leg   • Atrial flutter (CMS/HCC)   • Cataract   • Pseudophakia   • Rectal bleed   • Chronic midline low back pain without sciatica   • Blood in stool   • Primary open angle glaucoma of both eyes, moderate stage   • Mixed hyperlipidemia   • Primary open angle glaucoma of both eyes, severe stage   • Chronic pain of left  knee   • Fall   • Hypothyroidism   • Closed fracture of acromial end of right clavicle   • Closed fracture of multiple ribs of right side   • Hypertension   • Neck pain   • Acute renal insufficiency   • Hypocalcemia     Past Medical History:   Diagnosis Date   • Acute congestive heart failure (CMS/HCC)    • Atrial flutter (CMS/HCC)    • Coronary arteriosclerosis    • Degenerative joint disease involving multiple joints    • Depressive disorder    • Generalized anxiety disorder    • Glaucoma    • Hypertension    • Nonrheumatic mitral valve insufficiency    • Nonrheumatic tricuspid valve disorder    • Osteoporosis    • Repeated falls    • Subdural hematoma (CMS/HCC)      Past Surgical History:   Procedure Laterality Date   • CORONARY ARTERY BYPASS GRAFT     • EXPLORATORY LAPAROTOMY     • HIP ARTHROPLASTY Left    • NECK LESION BIOPSY     • THYROIDECTOMY         Therapy Treatment          Rehabilitation Treatment Summary     Row Name 09/01/18 1015             Treatment Time/Intention    Discipline occupational therapy assistant  -      Document Type therapy note (daily note)  -      Subjective Information no complaints  -      Mode of Treatment individual therapy;occupational therapy  -      Therapy Frequency (OT Eval) other (see comments)   5-7 days a wk  -      Patient Effort good  -      Recorded by [] Narcisa Leo COTA/L 09/01/18 1511      Row Name 09/01/18 1015             Cognitive Assessment/Intervention- PT/OT    Affect/Mental Status (Cognitive) WNL  -      Recorded by [] Narcisa Leo COTA/L 09/01/18 1511      Row Name 09/01/18 1015             Bed Mobility Assessment/Treatment    Supine-Sit [place] (Bed Mobility) conditional independence  -      Recorded by [] Narcisa Leo COTA/L 09/01/18 1511      Row Name 09/01/18 1015             Sit-Stand Transfer    Sit-Stand [place] (Transfers) supervision;contact guard  -      Recorded by [] Narcisa Leo COTA/GIGI 09/01/18  1511      Row Name 09/01/18 1015             Stand-Sit Transfer    Stand-Sit [place] (Transfers) supervision;contact guard  -      Recorded by [] Narcisa Leo COTA/L 09/01/18 1511      Row Name 09/01/18 1015             Bathing Assessment/Intervention    Bathing [place] Level bathing skills;lower body;upper body  -JH      Assistive Devices (Bathing) bath mitt  -JH      Bathing Position edge of bed sitting  -      Recorded by [] Narcisa Leo COTA/L 09/01/18 1511      Row Name 09/01/18 1015             Upper Body Dressing Assessment/Training    Upper Body Dressing [place] Level doff;don;front opening garment;pajama/robe  -      Upper Body Dressing Position edge of bed sitting  -JH      Recorded by [] Narcisa Leo COTA/L 09/01/18 1511      Row Name 09/01/18 1015             Lower Body Dressing Assessment/Training    Lower Body Dressing [place] Level doff;don;socks;undergarment  -JH      Lower Body Dressing Position edge of bed sitting;supported standing  -      Recorded by [] Narcisa Leo COTA/L 09/01/18 1511      Row Name 09/01/18 1015             Grooming Assessment/Training    [place] Level (Grooming) grooming skills  -      Grooming Position supported standing  -      Recorded by [] Narcisa Leo COTA/L 09/01/18 1511      Row Name 09/01/18 1015             Positioning and Restraints    Pre-Treatment Position in bed  -JH      Post Treatment Position chair  -JH      In Chair sitting;call light within reach;encouraged to call for assist;with family/caregiver  -      Recorded by [] Narcisa Leo COTA/L 09/01/18 1511      Row Name 09/01/18 1015             Pain Scale: Numbers Pre/Post-Treatment    Pain Scale: Numbers, Pretreatment 0/10 - no pain  -      Pain Scale: Numbers, Post-Treatment 0/10 - no pain  -      Recorded by [] Narcisa Leo COTA/L 09/01/18 1511      Row Name 09/01/18 1015             Outcome Summary/Treatment Plan (OT)     Daily Summary of Progress (OT) progress toward functional goals is good  -      Plan for Continued Treatment (OT) Continue per  POC   -      Anticipated Discharge Disposition (OT) home with 24/7 care;home with home health  -      Recorded by [] Narcisa Leo COTA/L 09/01/18 1511        User Key  (r) = Recorded By, (t) = Taken By, (c) = Cosigned By    Initials Name Effective Dates Discipline     Narcisa Leo COTA/L 03/07/18 -  OT                   OT Rehab Goals     Row Name 09/01/18 1015             Transfer Goal 1 (OT)    Activity/Assistive Device (Transfer Goal 1, OT) transfers, all;rollator;walker, rolling  -      [place] Level/Cues Needed (Transfer Goal 1, OT) supervision required  -      Time Frame (Transfer Goal 1, OT) long term goal (LTG);by discharge  -      Progress/Outcome (Transfer Goal 1, OT) goal not met  -         Bathing Goal 1 (OT)    Activity/Assistive Device (Bathing Goal 1, OT) bathing skills, all;long-handled sponge;reacher  -      [place] Level/Cues Needed (Bathing Goal 1, OT) set-up required;supervision required  -      Time Frame (Bathing Goal 1, OT) long term goal (LTG);by discharge  -      Progress/Outcomes (Bathing Goal 1, OT) goal not met  -         Dressing Goal 1 (OT)    Activity/Assistive Device (Dressing Goal 1, OT) dressing skills, all;long handled shoe horn;reacher;sock-aid  -      [place]/Cues Needed (Dressing Goal 1, OT) set-up required;supervision required  -      Time Frame (Dressing Goal 1, OT) long term goal (LTG);by discharge  -      Progress/Outcome (Dressing Goal 1, OT) goal not met  -         Toileting Goal 1 (OT)    Activity/Device (Toileting Goal 1, OT) toileting skills, all;commode, bedside with drop arms;raised toilet seat  -      [place] Level/Cues Needed (Toileting Goal 1, OT) set-up required;supervision required  -      Time Frame (Toileting Goal 1, OT) long term goal (LTG);by discharge  [facility]       Progress/Outcome (Toileting Goal 1, OT) goal not met  -        User Key  (r) = Recorded By, (t) = Taken By, (c) = Cosigned By    Initials Name Provider Type Discipline     Narcisa Leo COTA/L Occupational Therapy Assistant OT        Occupational Therapy Education     Title: PT OT SLP Therapies (Active)     Topic: Occupational Therapy (Active)     Point: Precautions (Done)     Description: Instruct learner(s) on prescribed precautions during self-care and functional transfers.   Learning Progress Summary     Learner Status Readiness Method Response Comment Documented by    Patient Done Acceptance E,TB VU Role of OT and POC. Benefit of activity and addressed safety concerns regarding d/c home MR 08/31/18 1141          Point: Body mechanics (Done)     Description: Instruct learner(s) on proper positioning and spine alignment during self-care, functional mobility activities and/or exercises.   Learning Progress Summary     Learner Status Readiness Method Response Comment Documented by    Patient Done Acceptance E,TB VU Role of OT and POC. Benefit of activity and addressed safety concerns regarding d/c home MR 08/31/18 1141                      User Key     Initials Effective Dates Name Provider Type Discipline     04/03/18 -  Melissa Hodges OT Occupational Therapist OT                OT Recommendation and Plan  Outcome Summary/Treatment Plan (OT)  Daily Summary of Progress (OT): progress toward functional goals is good  Plan for Continued Treatment (OT): Continue per  POC   Anticipated Discharge Disposition (OT): home with 24/7 care, home with home health  Therapy Frequency (OT Eval): other (see comments) (5-7 days a wk)  Daily Summary of Progress (OT): progress toward functional goals is good  Outcome Summary: Pt engage in adl task requiring sba to min A, pt is ongoing with OT goals, HH OT/PT upon d/c         Outcome Measures     Row Name 09/01/18 1015 08/31/18 1357 08/31/18 0846       How much help from  another person do you currently need...    Turning from your back to your side while in flat bed without using bedrails?  -- 4  -LF  --    Moving from lying on back to sitting on the side of a flat bed without bedrails?  -- 3  -LF  --    Moving to and from a bed to a chair (including a wheelchair)?  -- 3  -LF  --    Standing up from a chair using your arms (e.g., wheelchair, bedside chair)?  -- 3  -LF  --    Climbing 3-5 steps with a railing?  -- 3  -LF  --    To walk in hospital room?  -- 3  -LF  --    AM-PAC 6 Clicks Score  -- 19  -LF  --       How much help from another is currently needed...    Putting on and taking off regular lower body clothing? 3  -JH  -- 2  -MR    Bathing (including washing, rinsing, and drying) 3  -JH  -- 3  -MR    Toileting (which includes using toilet bed pan or urinal) 3  -  -- 3  -MR    Putting on and taking off regular upper body clothing 3  -JH  -- 3  -MR    Taking care of personal grooming (such as brushing teeth) 3  -JH  -- 3  -MR    Eating meals 3  -JH  -- 4  -MR    Score 18  -JH  -- 18  -MR       Functional Assessment    Outcome Measure Options  -- AM-PAC 6 Clicks Basic Mobility (PT)  - AM-PAC 6 Clicks Daily Activity (OT)  -    Row Name 08/30/18 1047             How much help from another person do you currently need...    Turning from your back to your side while in flat bed without using bedrails? 3  -COLE      Moving from lying on back to sitting on the side of a flat bed without bedrails? 3  -COLE      Moving to and from a bed to a chair (including a wheelchair)? 3  -COLE      Standing up from a chair using your arms (e.g., wheelchair, bedside chair)? 3  -COLE      Climbing 3-5 steps with a railing? 3  -COLE      To walk in hospital room? 3  -COLE      AM-PAC 6 Clicks Score 18  -COLE         Functional Assessment    Outcome Measure Options AM-PAC 6 Clicks Basic Mobility (PT)  -COLE        User Key  (r) = Recorded By, (t) = Taken By, (c) = Cosigned By    Initials Name Provider Type     Janice Lyles, PT Physical Therapist     Narcisa Leo ADAN/L Occupational Therapy Assistant    Melissa Rodriguez BRET, OT Occupational Therapist    Asuncion Centeno, PT Physical Therapist           Time Calculation:         Time Calculation- OT     Row Name 09/01/18 1515             Time Calculation- OT    OT Start Time 1015  -      OT Stop Time 1140  -      OT Time Calculation (min) 85 min  -      OT Received On 09/01/18  -        User Key  (r) = Recorded By, (t) = Taken By, (c) = Cosigned By    Initials Name Provider Type     Narcisa Leo ADAN/L Occupational Therapy Assistant           Therapy Suggested Charges     Code   Minutes Charges    None           Therapy Charges for Today     Code Description Service Date Service Provider Modifiers Qty    66313781311 HC OT SELF CARE/MGMT/TRAIN EA 15 MIN 9/1/2018 Narcisa Leo COTA/L GO 5    29705632847 HC OT THERAPEUTIC ACT EA 15 MIN 9/1/2018 Narcisa Leo COTA/L GO 1          OT G-codes  OT Professional Judgement Used?: Yes  OT Functional Scales Options: AM-PAC 6 Clicks Daily Activity (OT)  Score: 18  Functional Limitation: Self care  Self Care Current Status (): At least 40 percent but less than 60 percent impaired, limited or restricted  Self Care Goal Status (): At least 20 percent but less than 40 percent impaired, limited or restricted    ANTIONETTE [place]/GIGI  9/1/2018

## 2018-09-01 NOTE — PLAN OF CARE
Problem: Fall Risk (Adult)  Goal: Identify Related Risk Factors and Signs and Symptoms  Outcome: Ongoing (interventions implemented as appropriate)    Goal: Absence of Fall  Outcome: Ongoing (interventions implemented as appropriate)      Problem: Patient Care Overview  Goal: Plan of Care Review  Outcome: Ongoing (interventions implemented as appropriate)   09/01/18 0259   Coping/Psychosocial   Plan of Care Reviewed With patient   Plan of Care Review   Progress improving   OTHER   Outcome Summary vs stable, pt rested well overnight will continue to monitor.     Goal: Individualization and Mutuality  Outcome: Ongoing (interventions implemented as appropriate)    Goal: Discharge Needs Assessment  Outcome: Ongoing (interventions implemented as appropriate)    Goal: Interprofessional Rounds/Family Conf  Outcome: Ongoing (interventions implemented as appropriate)      Problem: Skin Injury Risk (Adult)  Goal: Identify Related Risk Factors and Signs and Symptoms  Outcome: Ongoing (interventions implemented as appropriate)    Goal: Skin Health and Integrity  Outcome: Ongoing (interventions implemented as appropriate)

## 2018-09-01 NOTE — PROGRESS NOTES
[facility] Medicine Services  INPATIENT PROGRESS NOTE    Length of Stay: 3  Date of Admission: 8/28/2018  Primary Care Physician: Abilio Bhardwaj MD    Subjective     Chief Complaint   Patient presents with   • Fatigue   • Back Pain       HPI:  Patient was seen and examined this morning.  Is much better appetite improving patient doing well     Patient is tolerating diet , passing gas, Patient denies fever or chills, headache or dizziness, chest pain or palpitations, difficulty in breathing or cough, abdominal pain N/V/D , blood in the urine or blood in the stool , or any urinary symptoms , weakness or numbness or tingling in the extremities or visual changes.    Review of Systems     All pertinent negatives and positives are as above. All other systems have been reviewed and are negative unless otherwise stated.   Objective    Physical exam    Temp:  [96.9 °F (36.1 °C)-99.1 °F (37.3 °C)] 96.9 °F (36.1 °C)  Heart Rate:  [65-91] 90  Resp:  [16-18] 18  BP: (103-158)/(53-95) 158/60    -Constitutional: Patient is AAO x 3. Patient appears well-developed and well-nourished.   -CVS: Normal rate, regular rhythm, normal heart sounds and intact distal pulses.  Exam reveals no gallop and no friction rub.  No murmur heard.  -Pulm: Effort normal and breath sounds normal. No respiratory distress. No wheezes, rales or ronchi.  -Abdominal: Soft. Bowel sounds are normal. No distension, no tenderness. There is no rebound and no guarding. No hernia.   -Musculoskeletal: No Cyanosis clubbing or edema.    Results Review:    Laboratory Data:     Results from last 7 days  Lab Units 09/01/18  0628 08/31/18  0851 08/30/18  0524  08/28/18  1418   SODIUM mmol/L 139 142 141  < > 139   POTASSIUM mmol/L 4.1 4.2 3.7  < > 4.2   CHLORIDE mmol/L 116* 118* 119*  < > 109   CO2 mmol/L 17.0* 15.0* 13.0*  < > 17.0*   BUN mg/dL 18 21 34*  < > 48*   CREATININE mg/dL 0.86 0.88 1.03*  < > 1.60*   GLUCOSE mg/dL 91  101* 93  < > 102*   CALCIUM mg/dL 8.9 8.2* 7.0*  < > 5.6*   BILIRUBIN mg/dL  --   --   --   --  0.5   ALK PHOS U/L  --   --   --   --  73   ALT (SGPT) U/L  --   --   --   --  20   AST (SGOT) U/L  --   --   --   --  21   ANION GAP mmol/L 6.0 9.0 9.0  < > 13.0   < > = values in this interval not displayed.  Estimated Creatinine Clearance: 32.8 mL/min (by C-G formula based on SCr of 0.86 mg/dL).    Results from last 7 days  Lab Units 08/31/18  0851 08/29/18  0622 08/28/18  1418   MAGNESIUM mg/dL 2.0  --  1.9   PHOSPHORUS mg/dL  --  3.9  --            Results from last 7 days  Lab Units 09/01/18  0628 08/31/18  1037 08/30/18  0524 08/29/18  0622 08/28/18  1418   WBC 10*3/mm3 14.78* 11.65* 8.73 8.94 13.03*   HEMOGLOBIN g/dL 12.3 11.8* 11.6* 11.7* 12.6   HEMATOCRIT % 36.2 34.5* 34.5* 35.2 37.8   PLATELETS 10*3/mm3 233 217 238 238 255       Results from last 7 days  Lab Units 08/28/18  1418   INR  1.29*       Culture Data:   Blood Culture   Date Value Ref Range Status   08/29/2018 No growth at 2 days  Preliminary   08/29/2018 No growth at 2 days  Preliminary     No results found for: URINECX  No results found for: RESPCX  No results found for: WOUNDCX  No results found for: STOOLCX  No components found for: BODYFLD    Microbiology                            Radiology Data:   Imaging Results (all)     Procedure Component Value Units Date/Time    US Renal Bilateral [[id number]] Updated:  08/29/18 0920    XR Chest 1 View [[id number]] Collected:  08/28/18 1330     Updated:  08/28/18 1347    Narrative:         EXAM:         Radiograph(s), Chest   VIEWS:   Frontal  ; 1       DATE/TIME:  8/28/2018 1:45 PM CDT                INDICATION:   Chest pain protocol  chest pain protocol    COMPARISON:  CXR: 7/14/18             FINDINGS:             - lines/tubes:    none     - cardiac:         size within normal limits         - mediastinum: contour within normal limits         - lungs:         no focal air space process,  pulmonary  interstitial edema, nodule(s)/mass             - pleura:         no evidence of  fluid                  - osseous:         Status post median sternotomy for CABG.  Healing/healed distal right clavicle fracture.                  - misc.:         Impression:       CONCLUSION:        1. No evidence of an active cardiopulmonary process.                                                              Electronically signed by:  ADRIANA Pittman MD  8/28/2018 1:46  PM CDT Workstation: [id number]          I have reviewed the patient's current medications.   Assessment/Plan     Hospital Problem List     Acute renal insufficiency    Hypocalcemia          Assessment / Plan    --leukocytosis worsened today . 14.78 multiple recent UTI's . Levaquin for now.   Will repepeat UA, CXR , obtain resp panel and check for influenza .    --MEO , resolved .cr 1.6 -->1.2-->1.03--0.88-->0.86 . Renal US reviewed ., off IVF     --hypokalcemia  5.6-->5.7-->7.0-->8.2-->8.9   On oral Ca and Vitamin D supplements , nephro input appreciated .        --Deconditioning PT OT    --hypothrydosim : will check TSH and resume home synhtroid .     --RLS : requip     --insomina : remron      --HX of CAD: contoiune home meds     --HTN :contiune home meds:     --Deconditioning PT OT    --GI/DVT prophylaxis    This document has been electronically signed by Derrell Reyes MD on September 1, 2018 11:14 AM

## 2018-09-01 NOTE — PLAN OF CARE
Problem: Patient Care Overview  Goal: Plan of Care Review  Outcome: Ongoing (interventions implemented as appropriate)   09/01/18 0259 09/01/18 0826 09/01/18 1015   Coping/Psychosocial   Plan of Care Reviewed With --  patient --    Plan of Care Review   Progress improving --  --    OTHER   Outcome Summary --  --  Pt engage in adl task requiring sba to min A, pt is ongoing with OT goals,  OT/PT upon d/c

## 2018-09-02 NOTE — THERAPY TREATMENT NOTE
Acute Care - Physical Therapy Treatment Note  [facility]     Patient Name: Tiffanie Arroyo  : 9/10/1926  MRN: [id number]  Today's Date: 2018  Onset of Illness/Injury or Date of Surgery: 18  Date of Referral to PT: 18  Referring Physician: Dr. Derrell Reyes    Admit Date: 2018    Visit Dx:    ICD-10-CM ICD-9-CM   1. Acute renal insufficiency N28.9 593.9   2. Hypocalcemia E83.51 275.41   3. Weakness R53.1 780.79   4. Impaired functional mobility, balance, gait, and endurance Z74.09 V49.89   5. Impaired mobility and activities of daily living Z74.09 799.89   6. Primary osteoarthritis involving multiple joints M15.0 715.09   7. Heart valve disorder I38 424.90   8. Repeated falls R29.6 781.99   9. Hypothyroidism due to Hashimoto's thyroiditis E03.8 244.8    E06.3 245.2     Patient Active Problem List   Diagnosis   • Friction burn of skin   • Abrasion of right hand   • Acute congestive heart failure (CMS/HCC)   • Age-related physical debility   • Amblyopia   • Artificial lens present   • S/P CABG (coronary artery bypass graft)   • Degenerative joint disease involving multiple joints   • Dehydration   • Depressive disorder   • Dyslipidemia   • Ecchymosis   • Generalized anxiety disorder   • Glaucoma   • Heart valve disorder   • Malignant neoplasm of skin   • Nausea   • Nonrheumatic mitral valve insufficiency   • Nonrheumatic tricuspid (valve) stenosis   • Nuclear senile cataract   • Osteoporosis   • Secondary pulmonary hypertension   • Peripheral edema   • Primary open angle glaucoma   • Repeated falls   • Restless legs   • Subdural hematoma (CMS/HCC)   • Noninfected skin tear of right leg   • Atrial flutter (CMS/HCC)   • Cataract   • Pseudophakia   • Rectal bleed   • Chronic midline low back pain without sciatica   • Blood in stool   • Primary open angle glaucoma of both eyes, moderate stage   • Mixed hyperlipidemia   • Primary open angle glaucoma of both eyes, severe stage   • Chronic pain of  left knee   • Fall   • Hypothyroidism   • Closed fracture of acromial end of right clavicle   • Closed fracture of multiple ribs of right side   • Hypertension   • Neck pain   • Acute renal insufficiency   • Hypocalcemia       Therapy Treatment          Rehabilitation Treatment Summary     Row Name 09/02/18 1508 09/02/18 1100          Treatment Time/Intention    Discipline physical therapy assistant  - occupational therapy assistant  -     Document Type therapy note (daily note)  - therapy note (daily note)  -     Subjective Information complains of;fatigue  - no complaints  -     Mode of Treatment physical therapy;individual therapy  - individual therapy;occupational therapy  -     Patient/Family Observations dtr present  - Daughter present   -     Therapy Frequency (OT Eval)  -- other (see comments)   5-7 days a wk   -     Patient Effort adequate  - good  -     Existing Precautions/Restrictions fall  -  --     Recorded by [JW] Lorna Cox, PTA 09/02/18 1545 [] Narcisa Leo ADAN/L 09/02/18 1417     Row Name 09/02/18 1508 09/02/18 1100          Vital Signs    Pre Systolic BP Rehab 133  -  --     Pre Treatment Diastolic BP 59  -JW  --     Post Systolic BP Rehab 181  -JW  --     Post Treatment Diastolic BP 77  -JW  --     Pretreatment Heart Rate (beats/min) 58  -JW  --     Posttreatment Heart Rate (beats/min) 71  -JW  --     Pre SpO2 (%) 97  -JW 97  -     O2 Delivery Pre Treatment room air  - room air  -     Post SpO2 (%) 99  -JW  --     O2 Delivery Post Treatment room air  -  --     Pre Patient Position Supine  -  --     Post Patient Position Sitting  -  --     Recorded by [JW] Lorna Cox, PTA 09/02/18 1545 [] Narcisa Leo ADAN/L 09/02/18 1417     Row Name 09/02/18 1508 09/02/18 1100          Cognitive Assessment/Intervention- PT/OT    Affect/Mental Status (Cognitive) WNL  - WNL  -     Orientation Status (Cognition) oriented x 4  -JW  oriented x 4  -JH     Recorded by [] Lorna Cox, PTA 09/02/18 1545 [] Narcisa Leo ADAN/L 09/02/18 1417     Row Name 09/02/18 1508             Bed Mobility Assessment/Treatment    Supine-Sit [place] (Bed Mobility) conditional independence  -JW      Recorded by [JW] Lorna Cox, PTA 09/02/18 1545      Row Name 09/02/18 1508 09/02/18 1100          Sit-Stand Transfer    Sit-Stand [place] (Transfers) supervision  - supervision;verbal cues  -     Assistive Device (Sit-Stand Transfers) walker, front-wheeled  -  --     Recorded by [] Lorna Cox, [facility] 09/02/18 1545 [] Narcisa Leo ADAN/L 09/02/18 1417     Row Name 09/02/18 1508 09/02/18 1100          Stand-Sit Transfer    Stand-Sit [place] (Transfers) supervision  - supervision;verbal cues  -     Assistive Device (Stand-Sit Transfers) walker, front-wheeled  -  --     Recorded by [] Lorna Cox, PTA 09/02/18 1545 [] Narcisa Leo ADAN/L 09/02/18 1417     Row Name 09/02/18 1100             Toilet Transfer    Type (Toilet Transfer) stand-sit;sit-stand  -      [place] Level (Toilet Transfer) verbal cues;contact guard  -      Assistive Device (Toilet Transfer) grab bars/safety frame;walker, front-wheeled  -      Recorded by [] Narcisa Leo ADAN/L 09/02/18 1417      Row Name 09/02/18 1508             Gait/Stairs Assessment/Training    [place] Level (Gait) contact guard  -      Assistive Device (Gait) walker, front-wheeled  -      Distance in Feet (Gait) 80 x 2  -JW      Recorded by [] Lorna Cox, PTA 09/02/18 1545      Row Name 09/02/18 1100             Grooming Assessment/Training    [place] Level (Grooming) grooming skills;wash face, hands;supervision;verbal cues  -      Grooming Position sink side;supported standing  -      Recorded by [] Narcisa Leo ADAN/L 09/02/18 1417      Row Name 09/02/18 1508             Lower Extremity  Standing Therapeutic Exercise    Comment, Standing Lower Extremity (Therapeutic Exercise) defers ther ex secondary to fatigue  -      Recorded by [JW] Lorna Cox, PTA 09/02/18 1547      Row Name 09/02/18 1508 09/02/18 1100          Positioning and Restraints    Pre-Treatment Position in bed  - standing in room   With daughter standing with pt heading toward the bathroom  -     Post Treatment Position chair  - chair  -     In Chair sitting;call light within reach;encouraged to call for assist;with family/caregiver  - sitting;call light within reach;encouraged to call for assist;with family/caregiver  -     Recorded by [JW] Lorna Cox, PTA 09/02/18 1545 [] Narcisa Leo, ADAN/L 09/02/18 1417     Row Name 09/02/18 1508 09/02/18 1100          Pain Scale: Numbers Pre/Post-Treatment    Pain Scale: Numbers, Pretreatment 0/10 - no pain  - 0/10 - no pain  -     Pain Scale: Numbers, Post-Treatment 0/10 - no pain  - 0/10 - no pain  -     Recorded by [] Lorna Cox, PTA 09/02/18 1545 [] Narcisa Leo, ADAN/L 09/02/18 1417     Row Name 09/02/18 1100             Outcome Summary/Treatment Plan (OT)    Daily Summary of Progress (OT) progress toward functional goals is good  -      Plan for Continued Treatment (OT) Con't per POC  -      Anticipated Discharge Disposition (OT) home with 24/7 care;home with home health  -      Recorded by [] Narcisa Leo, ADAN/L 09/02/18 1417      Row Name 09/02/18 1508             Outcome Summary/Treatment Plan (PT)    Plan for Continued Treatment (PT) continue  -      Recorded by [] Lorna Cox, PTA 09/02/18 1545        User Key  (r) = Recorded By, (t) = Taken By, (c) = Cosigned By    Initials Name Effective Dates Discipline     Lorna Cox, PTA 08/11/15 -  PT     Narcisa Leo, ADAN/L 03/07/18 -  OT                       PT Rehab Goals     Row Name 09/02/18 1508             Bed Mobility Goal 1  (PT)    Activity/Assistive Device (Bed Mobility Goal 1, PT) rolling to left;rolling to right;sit to supine;supine to sit  -JW      [place] Level/Cues Needed (Bed Mobility Goal 1, PT) contact guard assist  -JW      Time Frame (Bed Mobility Goal 1, PT) by discharge  -JW      Progress/Outcomes (Bed Mobility Goal 1, PT) goal partially met;continuing progress toward goal;goal ongoing  -JW         Transfer Goal 1 (PT)    Activity/Assistive Device (Transfer Goal 1, PT) sit-to-stand/stand-to-sit;bed-to-chair/chair-to-bed  -JW      [place] Level/Cues Needed (Transfer Goal 1, PT) conditional independence  -JW      Time Frame (Transfer Goal 1, PT) by discharge  -JW      Progress/Outcome (Transfer Goal 1, PT) goal not met;continuing progress toward goal;goal ongoing  -JW         Gait Training Goal 1 (PT)    Activity/Assistive Device (Gait Training Goal 1, PT) gait (walking locomotion);assistive device use;walker, rolling  -JW      [place] Level (Gait Training Goal 1, PT) supervision required;conditional independence  -JW      Distance (Gait Goal 1, PT) 150ft  -JW      Time Frame (Gait Training Goal 1, PT) by discharge  -JW      Progress/Outcome (Gait Training Goal 1, PT) goal not met  -JW         Strength Goal 1 (PT)    Strength Goal 1 (PT) Pt will tolerate 15 reps AROM exercises, OOB in chair  -JW      Time Frame (Strength Goal 1, PT) by discharge  -JW      Progress/Outcome (Strength Goal 1, PT) goal partially met;continuing progress toward goal;goal ongoing  -JW        User Key  (r) = Recorded By, (t) = Taken By, (c) = Cosigned By    Initials Name Provider Type Discipline    Lorna Sun, PTA Physical Therapy Assistant PT          Physical Therapy Education     Title: PT OT SLP Therapies (Active)     Topic: Physical Therapy (Active)     Point: Mobility training (Active)    Learning Progress Summary     Learner Status Readiness Method Response Comment Documented by    Patient Active Acceptance E NR  benefits of ambulation outside of room.  08/31/18 1430     Active Acceptance E NR   08/30/18 1345          Point: Body mechanics (Active)    Learning Progress Summary     Learner Status Readiness Method Response Comment Documented by    Patient Active Acceptance E NR   08/30/18 1345          Point: Precautions (Active)    Learning Progress Summary     Learner Status Readiness Method Response Comment Documented by    Patient Active Acceptance E NR benefits of ambulation outside of room.  08/31/18 1430     Active Acceptance E NR   08/30/18 1345                      User Key     Initials Effective Dates Name Provider Type Discipline     04/03/18 -  Janice Madrid, PT Physical Therapist PT     07/23/18 -  Asuncion Pickering, PT Physical Therapist PT                    PT Recommendation and Plan     Outcome Summary/Treatment Plan (PT)  Plan for Continued Treatment (PT): continue  Plan of Care Reviewed With: patient  Progress: improving  Outcome Summary: pt t/myles sup/sit w/ Mod Ind, sit/stand w/ SBA, ambulated ~80' x 2 w/ RW w/ CGA, pt fatigues easily, recommend  PT at d/c          Outcome Measures     Row Name 09/02/18 1508 09/02/18 1100 09/01/18 1015       How much help from another person do you currently need...    Turning from your back to your side while in flat bed without using bedrails? 4  -JW  --  --    Moving from lying on back to sitting on the side of a flat bed without bedrails? 3  -JW  --  --    Moving to and from a bed to a chair (including a wheelchair)? 3  -JW  --  --    Standing up from a chair using your arms (e.g., wheelchair, bedside chair)? 3  -JW  --  --    Climbing 3-5 steps with a railing? 3  -JW  --  --    To walk in hospital room? 3  -JW  --  --    AM-PAC 6 Clicks Score 19  -JW  --  --       How much help from another is currently needed...    Putting on and taking off regular lower body clothing?  -- 3  - 3  -    Bathing (including washing, rinsing, and drying)  -- 3  - 3  -     Toileting (which includes using toilet bed pan or urinal)  -- 3  - 3  -    Putting on and taking off regular upper body clothing  -- 3  - 3  -    Taking care of personal grooming (such as brushing teeth)  -- 3  - 3  -    Eating meals  -- 4  - 3  -    Score  -- 19  - 18  -       Functional Assessment    Outcome Measure Options AM-PAC 6 Clicks Basic Mobility (PT)  -  --  --    Row Name 08/31/18 1357 08/31/18 0846          How much help from another person do you currently need...    Turning from your back to your side while in flat bed without using bedrails? 4  -LF  --     Moving from lying on back to sitting on the side of a flat bed without bedrails? 3  -LF  --     Moving to and from a bed to a chair (including a wheelchair)? 3  -LF  --     Standing up from a chair using your arms (e.g., wheelchair, bedside chair)? 3  -LF  --     Climbing 3-5 steps with a railing? 3  -LF  --     To walk in hospital room? 3  -LF  --     AM-PAC 6 Clicks Score 19  -LF  --        How much help from another is currently needed...    Putting on and taking off regular lower body clothing?  -- 2  -MR     Bathing (including washing, rinsing, and drying)  -- 3  -MR     Toileting (which includes using toilet bed pan or urinal)  -- 3  -MR     Putting on and taking off regular upper body clothing  -- 3  -MR     Taking care of personal grooming (such as brushing teeth)  -- 3  -MR     Eating meals  -- 4  -MR     Score  -- 18  -MR        Functional Assessment    Outcome Measure Options AM-PAC 6 Clicks Basic Mobility (PT)  -LF AM-PAC 6 Clicks Daily Activity (OT)  -MR       User Key  (r) = Recorded By, (t) = Taken By, (c) = Cosigned By    Initials Name Provider Type    Lorna Sun, PTA Physical Therapy Assistant    Narcisa Sequeira, ANTIONETTE/L Occupational Therapy Assistant    Melissa Rodriguez, OT Occupational Therapist    Asuncion Centeno, PT Physical Therapist           Time Calculation:         PT Charges      Row Name 09/02/18 1508             Time Calculation    Start Time 1508  -      Stop Time 1524  -      Time Calculation (min) 16 min  -JW      PT Received On 09/02/18  -         Time Calculation- PT    Total Timed Code Minutes- PT 16 minute(s)  -        User Key  (r) = Recorded By, (t) = Taken By, (c) = Cosigned By    Initials Name Provider Type    Lorna Sun, PTA Physical Therapy Assistant        Therapy Suggested Charges     Code   Minutes Charges    18114 (CPT®) Hc Pt Neuromusc Re Education Ea 15 Min      11749 (CPT®) Hc Pt Ther Proc Ea 15 Min 21 1    56876 (CPT®) Hc Gait Training Ea 15 Min      81012 (CPT®) Hc Pt Therapeutic Act Ea 15 Min      67933 (CPT®) Hc Pt Manual Therapy Ea 15 Min      70016 (CPT®) Hc Pt Iontophoresis Ea 15 Min      06984 (CPT®) Hc Pt Elec Stim Ea-Per 15 Min      04544 (CPT®) Hc Pt Ultrasound Ea 15 Min      56571 (CPT®) Hc Pt Self Care/Mgmt/Train Ea 15 Min      24408 (CPT®) Hc Pt Prosthetic (S) Train Initial Encounter, Each 15 Min      83788 (CPT®) Hc Pt Orthotic(S)/Prosthetic(S) Encounter, Each 15 Min      49491 (CPT®) Hc Orthotic(S) Mgmt/Train Initial Encounter, Each 15min      Total  21 1        Therapy Charges for Today     Code Description Service Date Service Provider Modifiers Qty    95077698447 HC GAIT TRAINING EA 15 MIN 9/2/2018 Lorna Cox PTA GP 1          PT G-Codes  PT Professional Judgement Used?: Yes  Outcome Measure Options: AM-PAC 6 Clicks Basic Mobility (PT)  Score: 19  Functional Limitation: Mobility: Walking and moving around  Mobility: Walking and Moving Around Current Status (): At least 20 percent but less than 40 percent impaired, limited or restricted  Mobility: Walking and Moving Around Goal Status (): At least 1 percent but less than 20 percent impaired, limited or restricted    Lorna Cox PTA  9/2/2018

## 2018-09-02 NOTE — PLAN OF CARE
Problem: Fall Risk (Adult)  Goal: Identify Related Risk Factors and Signs and Symptoms  Outcome: Ongoing (interventions implemented as appropriate)    Goal: Absence of Fall  Outcome: Ongoing (interventions implemented as appropriate)      Problem: Patient Care Overview  Goal: Plan of Care Review  Outcome: Ongoing (interventions implemented as appropriate)   09/02/18 0345   Coping/Psychosocial   Plan of Care Reviewed With patient   Plan of Care Review   Progress no change   OTHER   Outcome Summary pt still very confused at times, vs stable, no new events overnight will continue to monitor.     Goal: Individualization and Mutuality  Outcome: Ongoing (interventions implemented as appropriate)    Goal: Discharge Needs Assessment  Outcome: Ongoing (interventions implemented as appropriate)    Goal: Interprofessional Rounds/Family Conf  Outcome: Ongoing (interventions implemented as appropriate)      Problem: Skin Injury Risk (Adult)  Goal: Identify Related Risk Factors and Signs and Symptoms  Outcome: Ongoing (interventions implemented as appropriate)    Goal: Skin Health and Integrity  Outcome: Ongoing (interventions implemented as appropriate)

## 2018-09-02 NOTE — PLAN OF CARE
Problem: Patient Care Overview  Goal: Plan of Care Review  Outcome: Ongoing (interventions implemented as appropriate)   09/02/18 7589   Coping/Psychosocial   Plan of Care Reviewed With patient   Plan of Care Review   Progress improving   OTHER   Outcome Summary pt t/myles sup/sit w/ Mod Ind, sit/stand w/ SBA, ambulated ~80' x 2 w/ RW w/ CGA, pt fatigues easily, recommend HH PT at d/c

## 2018-09-02 NOTE — PROGRESS NOTES
[facility] Medicine Services  INPATIENT PROGRESS NOTE    Length of Stay: 4  Date of Admission: 8/28/2018  Primary Care Physician: Abilio Bhardwaj MD    Subjective     Chief Complaint   Patient presents with   • Fatigue   • Back Pain       HPI:  Patient was seen and examined this morning.  Daughter in the room with patient.  Doing better.        Review of Systems     All pertinent negatives and positives are as above. All other systems have been reviewed and are negative unless otherwise stated.   Objective    Physical exam    Temp:  [97.8 °F (36.6 °C)-98.2 °F (36.8 °C)] 97.8 °F (36.6 °C)  Heart Rate:  [54-83] 54  Resp:  [17-18] 18  BP: (111-140)/(58-67) 140/67    -Constitutional: Patient is AAO x 3. Patient appears well-developed and well-nourished.   -CVS: Normal rate, regular rhythm, normal heart sounds and intact distal pulses.  Exam reveals no gallop and no friction rub.  No murmur heard.  -Pulm: Effort normal and breath sounds normal. No respiratory distress. No wheezes, rales or ronchi.  -Abdominal: Soft. Bowel sounds are normal. No distension, no tenderness. There is no rebound and no guarding. No hernia.   -Musculoskeletal: No Cyanosis clubbing or edema.    Results Review:    Laboratory Data:     Results from last 7 days  Lab Units 09/02/18  0540 09/01/18  0628 08/31/18  0851  08/28/18  1418   SODIUM mmol/L 137 139 142  < > 139   POTASSIUM mmol/L 4.6 4.1 4.2  < > 4.2   CHLORIDE mmol/L 114* 116* 118*  < > 109   CO2 mmol/L 16.0* 17.0* 15.0*  < > 17.0*   BUN mg/dL 19 18 21  < > 48*   CREATININE mg/dL 0.79 0.86 0.88  < > 1.60*   GLUCOSE mg/dL 96 91 101*  < > 102*   CALCIUM mg/dL 9.7 8.9 8.2*  < > 5.6*   BILIRUBIN mg/dL  --   --   --   --  0.5   ALK PHOS U/L  --   --   --   --  73   ALT (SGPT) U/L  --   --   --   --  20   AST (SGOT) U/L  --   --   --   --  21   ANION GAP mmol/L 7.0 6.0 9.0  < > 13.0   < > = values in this interval not displayed.  Estimated Creatinine  Clearance: 35.1 mL/min (by C-G formula based on SCr of 0.79 mg/dL).    Results from last 7 days  Lab Units 08/31/18  0851 08/29/18  0622 08/28/18  1418   MAGNESIUM mg/dL 2.0  --  1.9   PHOSPHORUS mg/dL  --  3.9  --            Results from last 7 days  Lab Units 09/02/18  0540 09/01/18  0628 08/31/18  1037 08/30/18  0524 08/29/18  0622   WBC 10*3/mm3 12.73* 14.78* 11.65* 8.73 8.94   HEMOGLOBIN g/dL 10.9* 12.3 11.8* 11.6* 11.7*   HEMATOCRIT % 31.6* 36.2 34.5* 34.5* 35.2   PLATELETS 10*3/mm3 259 233 217 238 238       Results from last 7 days  Lab Units 08/28/18  1418   INR  1.29*       Culture Data:   Blood Culture   Date Value Ref Range Status   09/01/2018 No growth at less than 24 hours  Preliminary   09/01/2018 No growth at less than 24 hours  Preliminary     No results found for: URINECX  No results found for: RESPCX  No results found for: WOUNDCX  No results found for: STOOLCX  No components found for: BODYFLD    Microbiology                            Radiology Data:   Imaging Results (all)     Procedure Component Value Units Date/Time    US Renal Bilateral [[id number]] Updated:  08/29/18 0920    XR Chest 1 View [[id number]] Collected:  08/28/18 1330     Updated:  08/28/18 1347    Narrative:         EXAM:         Radiograph(s), Chest   VIEWS:   Frontal  ; 1       DATE/TIME:  8/28/2018 1:45 PM CDT                INDICATION:   Chest pain protocol  chest pain protocol    COMPARISON:  CXR: 7/14/18             FINDINGS:             - lines/tubes:    none     - cardiac:         size within normal limits         - mediastinum: contour within normal limits         - lungs:         no focal air space process, pulmonary  interstitial edema, nodule(s)/mass             - pleura:         no evidence of  fluid                  - osseous:         Status post median sternotomy for CABG.  Healing/healed distal right clavicle fracture.                  - misc.:         Impression:       CONCLUSION:        1. No evidence of an active  cardiopulmonary process.                                                              Electronically signed by:  ADRIANA Pittman MD  8/28/2018 1:46  PM CDT Workstation: [id number]          I have reviewed the patient's current medications.   Assessment/Plan     Hospital Problem List     Acute renal insufficiency    Hypocalcemia          Assessment / Plan    --leukocytosis improved .today . 12.73  multiple recent UTI's . continue Levaquin .   epepeat UA, CXR , obtain resp panel and check for influenza done yesterday .  Plan for Dc tomorrow . Family discussing SNIF vs 24/7     --MOE , resolved .cr 1.6 -->1.2-->1.03--0.88-->0.86 . Renal US reviewed ., off IVF     --hypokalcemia  5.6-->5.7-->7.0-->8.2-->8.9   On oral Ca and Vitamin D supplements , nephro input appreciated .       --Deconditioning PT OT    --hypothrydosim : will check TSH and resume home synhtroid .     --RLS : requip     --insomina : remron      --HX of CAD: contoiune home meds     --HTN :contiune home meds:     --Deconditioning PT OT    --GI/DVT prophylaxis    This document has been electronically signed by Derrell Reyes MD on September 2, 2018 10:09 AM

## 2018-09-02 NOTE — DISCHARGE PLACEMENT REQUEST
"Tiffanie Arroyo (89 y.o. Female)     Date of Birth Social Security Number Address Home Phone MRN    09/10/1926  [address] [place] [id number] [phone] [id number]    [employer] Mar[employer] Status          Anabaptism        Admission Date Admission Type Admitting Provider Attending Provider Department, Room/Bed    8/28/18 Emergency Derrell Reyes MD Bleibel, Fadi, MD [address], 433/1    Discharge Date Discharge Disposition Discharge Destination                       Attending Provider:  Derrell Reyes MD    Allergies:  Codeine, Penicillins, Sulfa Antibiotics    Isolation:  None   Infection:  None   Code Status:  CPR    Ht:  166.4 cm (65.5\")   Wt:  48.5 kg (107 lb)    Admission Cmt:  None   Principal Problem:  None                Active Insurance as of 8/28/2018     Primary Coverage     Payor Plan Insurance Group Employer/Plan Group    MEDICARE MEDICARE A & B      Payor Plan Address Payor Plan Phone Number Effective From Effective To    [address] [phone] 9/1/1991     [facility] [id number]       Subscriber Name Subscriber Birth Date Member ID       TIFFANIE ARROYO 9/10/1926 [id number]           Secondary Coverage     Payor Plan Insurance Group Employer/Plan Group    [facility] SUPP [id number]     Payor Plan Address Payor Plan Phone Number Effective From Effective To    [address]  12/1/2016     [facility] [id number]       Subscriber Name Subscriber Birth Date Member ID       TIFFANIE ARROYO 9/10/1926 [id number]                 Emergency Contacts      (Rel.) Home Phone Work Phone Mobile Phone    William Arroyo (Son) [phone] -- [phone]    Daiana Arroyo (Daughter) [phone] -- [phone]            "

## 2018-09-02 NOTE — THERAPY TREATMENT NOTE
Acute Care - Occupational Therapy Treatment Note  [facility]     Patient Name: Tiffanie Arroyo  : 9/10/1926  MRN: [id number]  Today's Date: 2018  Onset of Illness/Injury or Date of Surgery: 18  Date of Referral to OT: 18  Referring Physician: Dr. Derrell Reyes    Admit Date: 2018       ICD-10-CM ICD-9-CM   1. Acute renal insufficiency N28.9 593.9   2. Hypocalcemia E83.51 275.41   3. Weakness R53.1 780.79   4. Impaired functional mobility, balance, gait, and endurance Z74.09 V49.89   5. Impaired mobility and activities of daily living Z74.09 799.89   6. Primary osteoarthritis involving multiple joints M15.0 715.09   7. Heart valve disorder I38 424.90   8. Repeated falls R29.6 781.99   9. Hypothyroidism due to Hashimoto's thyroiditis E03.8 244.8    E06.3 245.2     Patient Active Problem List   Diagnosis   • Friction burn of skin   • Abrasion of right hand   • Acute congestive heart failure (CMS/HCC)   • Age-related physical debility   • Amblyopia   • Artificial lens present   • S/P CABG (coronary artery bypass graft)   • Degenerative joint disease involving multiple joints   • Dehydration   • Depressive disorder   • Dyslipidemia   • Ecchymosis   • Generalized anxiety disorder   • Glaucoma   • Heart valve disorder   • Malignant neoplasm of skin   • Nausea   • Nonrheumatic mitral valve insufficiency   • Nonrheumatic tricuspid (valve) stenosis   • Nuclear senile cataract   • Osteoporosis   • Secondary pulmonary hypertension   • Peripheral edema   • Primary open angle glaucoma   • Repeated falls   • Restless legs   • Subdural hematoma (CMS/HCC)   • Noninfected skin tear of right leg   • Atrial flutter (CMS/HCC)   • Cataract   • Pseudophakia   • Rectal bleed   • Chronic midline low back pain without sciatica   • Blood in stool   • Primary open angle glaucoma of both eyes, moderate stage   • Mixed hyperlipidemia   • Primary open angle glaucoma of both eyes, severe stage   • Chronic pain of left  knee   • Fall   • Hypothyroidism   • Closed fracture of acromial end of right clavicle   • Closed fracture of multiple ribs of right side   • Hypertension   • Neck pain   • Acute renal insufficiency   • Hypocalcemia     Past Medical History:   Diagnosis Date   • Acute congestive heart failure (CMS/HCC)    • Atrial flutter (CMS/HCC)    • Coronary arteriosclerosis    • Degenerative joint disease involving multiple joints    • Depressive disorder    • Generalized anxiety disorder    • Glaucoma    • Hypertension    • Nonrheumatic mitral valve insufficiency    • Nonrheumatic tricuspid valve disorder    • Osteoporosis    • Repeated falls    • Subdural hematoma (CMS/HCC)      Past Surgical History:   Procedure Laterality Date   • CORONARY ARTERY BYPASS GRAFT     • EXPLORATORY LAPAROTOMY     • HIP ARTHROPLASTY Left    • NECK LESION BIOPSY     • THYROIDECTOMY         Therapy Treatment          Rehabilitation Treatment Summary     Row Name 09/02/18 1100             Treatment Time/Intention    Discipline occupational therapy assistant  -      Document Type therapy note (daily note)  -      Subjective Information no complaints  -      Mode of Treatment individual therapy;occupational therapy  -      Patient/Family Observations Daughter present   -      Therapy Frequency (OT Eval) other (see comments)   5-7 days a wk   -      Patient Effort good  -      Recorded by [] Narcisa Leo COTA/L 09/02/18 1417      Row Name 09/02/18 1100             Vital Signs    Pre SpO2 (%) 97  -      O2 Delivery Pre Treatment room air  -      Recorded by [] Narcisa Leo COTA/L 09/02/18 1417      Row Name 09/02/18 1100             Cognitive Assessment/Intervention- PT/OT    Affect/Mental Status (Cognitive) WNL  -      Orientation Status (Cognition) oriented x 4  -      Recorded by [] Narcisa Leo COTA/L 09/02/18 1417      Row Name 09/02/18 1100             Sit-Stand Transfer    Sit-Stand [place] (Transfers)  supervision;verbal cues  -      Recorded by [] Narcisa Leo COTA/L 09/02/18 1417      Row Name 09/02/18 1100             Stand-Sit Transfer    Stand-Sit [place] (Transfers) supervision;verbal cues  -      Recorded by [] Narcisa Leo COTA/L 09/02/18 1417      Row Name 09/02/18 1100             Toilet Transfer    Type (Toilet Transfer) stand-sit;sit-stand  -      [place] Level (Toilet Transfer) verbal cues;contact guard  -      Assistive Device (Toilet Transfer) grab bars/safety frame;walker, front-wheeled  -      Recorded by [] Narcisa Leo COTA/L 09/02/18 1417      Row Name 09/02/18 1100             Grooming Assessment/Training    [place] Level (Grooming) grooming skills;wash face, hands;supervision;verbal cues  -      Grooming Position sink side;supported standing  -      Recorded by [] Narcisa Leo COTA/L 09/02/18 1417      Row Name 09/02/18 1100             Positioning and Restraints    Pre-Treatment Position standing in room   With daughter standing with pt heading toward the bathroom  -      Post Treatment Position chair  -      In Chair sitting;call light within reach;encouraged to call for assist;with family/caregiver  -      Recorded by [] Narcisa Leo COTA/L 09/02/18 1417      Row Name 09/02/18 1100             Pain Scale: Numbers Pre/Post-Treatment    Pain Scale: Numbers, Pretreatment 0/10 - no pain  -      Pain Scale: Numbers, Post-Treatment 0/10 - no pain  -      Recorded by [] Narcisa Leo COTA/L 09/02/18 1417      Row Name 09/02/18 1100             Outcome Summary/Treatment Plan (OT)    Daily Summary of Progress (OT) progress toward functional goals is good  -      Plan for Continued Treatment (OT) Con't per POC  -      Anticipated Discharge Disposition (OT) home with 24/7 care;home with home health  -      Recorded by [] Narcisa Leo COTA/GIGI 09/02/18 1417        User Key  (r) = Recorded By, (t) = Taken By, (c) =  Cosigned By    Initials Name Effective Dates Discipline    EMIL Narcisa Leo COTA/L 03/07/18 -  OT                   OT Rehab Goals     Row Name 09/02/18 1100             Transfer Goal 1 (OT)    Activity/Assistive Device (Transfer Goal 1, OT) transfers, all;rollator;walker, rolling  -      [place] Level/Cues Needed (Transfer Goal 1, OT) supervision required  -      Time Frame (Transfer Goal 1, OT) long term goal (LTG);by discharge  -      Progress/Outcome (Transfer Goal 1, OT) goal not met  -         Bathing Goal 1 (OT)    Activity/Assistive Device (Bathing Goal 1, OT) bathing skills, all;long-handled sponge;reacher  -      [place] Level/Cues Needed (Bathing Goal 1, OT) set-up required;supervision required  -      Time Frame (Bathing Goal 1, OT) long term goal (LTG);by discharge  -      Progress/Outcomes (Bathing Goal 1, OT) goal not met  -         Dressing Goal 1 (OT)    Activity/Assistive Device (Dressing Goal 1, OT) dressing skills, all;long handled shoe horn;reacher;sock-aid  -      [place]/Cues Needed (Dressing Goal 1, OT) set-up required;supervision required  -      Time Frame (Dressing Goal 1, OT) long term goal (LTG);by discharge  -      Progress/Outcome (Dressing Goal 1, OT) goal not met  -         Toileting Goal 1 (OT)    Activity/Device (Toileting Goal 1, OT) toileting skills, all;commode, bedside with drop arms;raised toilet seat  -      [place] Level/Cues Needed (Toileting Goal 1, OT) set-up required;supervision required  -      Time Frame (Toileting Goal 1, OT) long term goal (LTG);by discharge  -      Progress/Outcome (Toileting Goal 1, OT) goal not met  -        User Key  (r) = Recorded By, (t) = Taken By, (c) = Cosigned By    Initials Name Provider Type Discipline    EMIL DumasNarcisa hannon ADAN/L Occupational Therapy Assistant OT        Occupational Therapy Education     Title: PT OT SLP Therapies (Active)     Topic: Occupational Therapy (Done)      Point: ADL training (Done)     Description: Instruct learner(s) on proper safety adaptation and remediation techniques during self care or transfers.   Instruct in proper use of assistive devices.   Learning Progress Summary     Learner Status Readiness Method Response Comment Documented by    Patient Done Acceptance E [facility] 09/02/18 1421    Family Done Acceptance E [facility] 09/02/18 1421          Point: Home exercise program (Done)     Description: Instruct learner(s) on appropriate technique for monitoring, assisting and/or progressing therapeutic exercises/activities.   Learning Progress Summary     Learner Status Readiness Method Response Comment Documented by    Patient Done Acceptance E [facility] 09/02/18 1421    Family Done Acceptance E [facility] 09/02/18 1421          Point: Precautions (Done)     Description: Instruct learner(s) on prescribed precautions during self-care and functional transfers.   Learning Progress Summary     Learner Status Readiness Method Response Comment Documented by    Patient Done Acceptance E [facility] 09/02/18 1421     Done Acceptance E,TB VU Role of OT and POC. Benefit of activity and addressed safety concerns regarding d/c home MR 08/31/18 1141    Family Done Acceptance E [facility] 09/02/18 1421          Point: Body mechanics (Done)     Description: Instruct learner(s) on proper positioning and spine alignment during self-care, functional mobility activities and/or exercises.   Learning Progress Summary     Learner Status Readiness Method Response Comment Documented by    Patient Done Acceptance E [facility] 09/02/18 1421     Done Acceptance E,TB VU Role of OT and POC. Benefit of activity and addressed safety concerns regarding d/c home MR 08/31/18 1141    Family Done Acceptance E [facility] 09/02/18 1421                      User Key     Initials Effective Dates Name Provider Type Discipline     03/07/18 -  Narcisa Leo COTA/L Occupational Therapy Assistant OT     04/03/18 -  [place]  Melissa QUEEN OT Occupational Therapist OT                OT Recommendation and Plan  Outcome Summary/Treatment Plan (OT)  Daily Summary of Progress (OT): progress toward functional goals is good  Plan for Continued Treatment (OT): Con't per POC  Anticipated Discharge Disposition (OT): home with 24/7 care, home with home health  Therapy Frequency (OT Eval): other (see comments) (5-7 days a wk )  Daily Summary of Progress (OT): progress toward functional goals is good  Outcome Summary: Pt and daughter educated on safe transfers,fall prevention,home safety, pt is ongoing with OT goals, OT/PT upon d/c         Outcome Measures     Row Name 09/02/18 1100 09/01/18 1015 08/31/18 1357       How much help from another person do you currently need...    Turning from your back to your side while in flat bed without using bedrails?  --  -- 4  -LF    Moving from lying on back to sitting on the side of a flat bed without bedrails?  --  -- 3  -LF    Moving to and from a bed to a chair (including a wheelchair)?  --  -- 3  -LF    Standing up from a chair using your arms (e.g., wheelchair, bedside chair)?  --  -- 3  -LF    Climbing 3-5 steps with a railing?  --  -- 3  -LF    To walk in hospital room?  --  -- 3  -LF    AM-PAC 6 Clicks Score  --  -- 19  -LF       How much help from another is currently needed...    Putting on and taking off regular lower body clothing? 3  - 3  -JH  --    Bathing (including washing, rinsing, and drying) 3  -JH 3  -JH  --    Toileting (which includes using toilet bed pan or urinal) 3  -JH 3  -JH  --    Putting on and taking off regular upper body clothing 3  -JH 3  -JH  --    Taking care of personal grooming (such as brushing teeth) 3  -JH 3  -JH  --    Eating meals 4  -JH 3  -JH  --    Score 19  -JH 18  -JH  --       Functional Assessment    Outcome Measure Options  --  -- AM-PAC 6 Clicks Basic Mobility (PT)  -LF    Row Name 08/31/18 0846             How much help from another is currently needed...     Putting on and taking off regular lower body clothing? 2  -MR      Bathing (including washing, rinsing, and drying) 3  -MR      Toileting (which includes using toilet bed pan or urinal) 3  -MR      Putting on and taking off regular upper body clothing 3  -MR      Taking care of personal grooming (such as brushing teeth) 3  -MR      Eating meals 4  -MR      Score 18  -MR         Functional Assessment    Outcome Measure Options AM-PAC 6 Clicks Daily Activity (OT)  -        User Key  (r) = Recorded By, (t) = Taken By, (c) = Cosigned By    Initials Name Provider Type     Narcisa Leo ADAN/L Occupational Therapy Assistant    Melissa Rodriguez BRET, OT Occupational Therapist    Asuncion Centeno, PT Physical Therapist           Time Calculation:         Time Calculation- OT     Row Name 09/02/18 1421             Time Calculation- OT    OT Start Time 1100  -      OT Stop Time 1155  -      OT Time Calculation (min) 55 min  -      OT Received On 09/02/18  -        User Key  (r) = Recorded By, (t) = Taken By, (c) = Cosigned By    Initials Name Provider Type     Narcisa Leo ADAN/L Occupational Therapy Assistant           Therapy Suggested Charges     Code   Minutes Charges    None           Therapy Charges for Today     Code Description Service Date Service Provider Modifiers Qty    30268376532 HC OT SELF CARE/MGMT/TRAIN EA 15 MIN 9/1/2018 Narcisa Leo ADAN/L GO 5    20342368478 HC OT THERAPEUTIC ACT EA 15 MIN 9/1/2018 Narcisa Leo ADAN/L GO 1    87987439360 HC OT THERAPEUTIC ACT EA 15 MIN 9/2/2018 Narcisa Leo ADAN/L GO 2    07530897810 HC OT SELF CARE/MGMT/TRAIN EA 15 MIN 9/2/2018 Narcisa Leo ADAN/L GO 2          OT G-codes  OT Professional Judgement Used?: Yes  OT Functional Scales Options: AM-PAC 6 Clicks Daily Activity (OT)  Score: 18  Functional Limitation: Self care  Self Care Current Status (): At least 40 percent but less than 60 percent impaired, limited or restricted  Self  Care Goal Status (): At least 20 percent but less than 40 percent impaired, limited or restricted    ANTIONETTE [place]/GIGI  9/2/2018

## 2018-09-02 NOTE — PLAN OF CARE
Problem: Patient Care Overview  Goal: Plan of Care Review  Outcome: Ongoing (interventions implemented as appropriate)   09/02/18 0345 09/02/18 0820 09/02/18 1100   Coping/Psychosocial   Plan of Care Reviewed With --  patient --    Plan of Care Review   Progress no change --  --    OTHER   Outcome Summary --  --  Pt and daughter educated on safe transfers,fall prevention,home safety, pt is ongoing with OT goals, OT/PT upon d/c

## 2018-09-03 NOTE — PLAN OF CARE
Problem: Fall Risk (Adult)  Goal: Identify Related Risk Factors and Signs and Symptoms  Outcome: Outcome(s) achieved Date Met: 09/03/18    Goal: Absence of Fall  Outcome: Ongoing (interventions implemented as appropriate)      Problem: Patient Care Overview  Goal: Plan of Care Review  Outcome: Ongoing (interventions implemented as appropriate)   09/03/18 1726   Coping/Psychosocial   Plan of Care Reviewed With patient   Plan of Care Review   Progress no change   OTHER   Outcome Summary Pt has been in pain this afternoon; tylenol administered; will continue to monitor.     Goal: Individualization and Mutuality  Outcome: Ongoing (interventions implemented as appropriate)      Problem: Skin Injury Risk (Adult)  Goal: Identify Related Risk Factors and Signs and Symptoms  Outcome: Outcome(s) achieved Date Met: 09/03/18    Goal: Skin Health and Integrity  Outcome: Ongoing (interventions implemented as appropriate)      Problem: Pain, Acute (Adult)  Goal: Identify Related Risk Factors and Signs and Symptoms  Outcome: Outcome(s) achieved Date Met: 09/03/18    Goal: Acceptable Pain Control/Comfort Level  Outcome: Ongoing (interventions implemented as appropriate)      Problem: Renal Failure/Kidney Injury, Acute (Adult)  Goal: Signs and Symptoms of Listed Potential Problems Will be Absent, Minimized or Managed (Renal Failure/Kidney Injury, Acute)  Outcome: Ongoing (interventions implemented as appropriate)

## 2018-09-03 NOTE — THERAPY TREATMENT NOTE
Acute Care - Physical Therapy Treatment Note  [facility]     Patient Name: Tiffanie Arroyo  : 9/10/1926  MRN: [id number]  Today's Date: 9/3/2018  Onset of Illness/Injury or Date of Surgery: 18  Date of Referral to PT: 18  Referring Physician: Dr. Derrell Reyes    Admit Date: 2018    Visit Dx:    ICD-10-CM ICD-9-CM   1. Acute renal insufficiency N28.9 593.9   2. Hypocalcemia E83.51 275.41   3. Weakness R53.1 780.79   4. Impaired functional mobility, balance, gait, and endurance Z74.09 V49.89   5. Impaired mobility and activities of daily living Z74.09 799.89   6. Primary osteoarthritis involving multiple joints M15.0 715.09   7. Heart valve disorder I38 424.90   8. Repeated falls R29.6 781.99   9. Hypothyroidism due to Hashimoto's thyroiditis E03.8 244.8    E06.3 245.2     Patient Active Problem List   Diagnosis   • Friction burn of skin   • Abrasion of right hand   • Acute congestive heart failure (CMS/HCC)   • Age-related physical debility   • Amblyopia   • Artificial lens present   • S/P CABG (coronary artery bypass graft)   • Degenerative joint disease involving multiple joints   • Dehydration   • Depressive disorder   • Dyslipidemia   • Ecchymosis   • Generalized anxiety disorder   • Glaucoma   • Heart valve disorder   • Malignant neoplasm of skin   • Nausea   • Nonrheumatic mitral valve insufficiency   • Nonrheumatic tricuspid (valve) stenosis   • Nuclear senile cataract   • Osteoporosis   • Secondary pulmonary hypertension   • Peripheral edema   • Primary open angle glaucoma   • Repeated falls   • Restless legs   • Subdural hematoma (CMS/HCC)   • Noninfected skin tear of right leg   • Atrial flutter (CMS/HCC)   • Cataract   • Pseudophakia   • Rectal bleed   • Chronic midline low back pain without sciatica   • Blood in stool   • Primary open angle glaucoma of both eyes, moderate stage   • Mixed hyperlipidemia   • Primary open angle glaucoma of both eyes, severe stage   • Chronic pain of  left knee   • Fall   • Hypothyroidism   • Closed fracture of acromial end of right clavicle   • Closed fracture of multiple ribs of right side   • Hypertension   • Neck pain   • Acute renal insufficiency   • Hypocalcemia       Therapy Treatment          Rehabilitation Treatment Summary     Row Name 09/03/18 1300             Treatment Time/Intention    Discipline physical therapy assistant  -EM      Document Type therapy note (daily note)  -EM      Mode of Treatment physical therapy;individual therapy  -EM      Patient/Family Observations dtr present initially   -EM      Therapy Frequency (PT Clinical Impression) daily  -EM      Patient Effort good  -EM      Existing Precautions/Restrictions fall   L LE weak  -EM      Recorded by [EM] Boston Fried, PTA 09/03/18 1307      Row Name 09/03/18 1300             Vital Signs    Pre Systolic BP Rehab 114  -EM      Pre Treatment Diastolic BP 56  -EM      Post Systolic BP Rehab 160  -EM      Post Treatment Diastolic BP 67  -EM      Pretreatment Heart Rate (beats/min) 62  -EM      Posttreatment Heart Rate (beats/min) 70  -EM      Pre SpO2 (%) 99  -EM      O2 Delivery Pre Treatment room air  -EM      Post SpO2 (%) 92  -EM      O2 Delivery Post Treatment room air  -EM      Pre Patient Position Supine  -EM      Intra Patient Position Standing  -EM      Post Patient Position Supine  -EM      Recorded by [EM] Boston Fried, PTA 09/03/18 1307      Row Name 09/03/18 1300             Cognitive Assessment/Intervention- PT/OT    Affect/Mental Status (Cognitive) WNL  -EM      Orientation Status (Cognition) oriented x 4  -EM      Follows Commands (Cognition) WFL  -EM      Cognitive Function (Cognitive) WNL;WFL  -EM      Personal Safety Interventions fall prevention program maintained;gait belt;nonskid shoes/slippers when out of bed;supervised activity  -EM      Recorded by [EM] Boston Fried, PTA 09/03/18 1307      Row Name 09/03/18 1300             Bed Mobility Assessment/Treatment     Supine-Sit [place] (Bed Mobility) minimum assist (75% patient effort)  -EM      Sit-Supine [place] (Bed Mobility) minimum assist (75% patient effort)  -EM      Comment (Bed Mobility) assist required for L LE only  -EM      Recorded by [EM] Boston Fried, PTA 09/03/18 1307      Row Name 09/03/18 1300             Sit-Stand Transfer    Sit-Stand [place] (Transfers) conditional independence  -EM      Assistive Device (Sit-Stand Transfers) walker, front-wheeled  -EM      Recorded by [EM] Boston Fried, PTA 09/03/18 1307      Row Name 09/03/18 1300             Stand-Sit Transfer    Stand-Sit [place] (Transfers) conditional independence  -EM      Assistive Device (Stand-Sit Transfers) walker, front-wheeled  -EM      Recorded by [EM] Boston Fried, PTA 09/03/18 1307      Row Name 09/03/18 1300             Gait/Stairs Assessment/Training    [place] Level (Gait) supervision  -EM      Assistive Device (Gait) walker, front-wheeled  -EM      Distance in Feet (Gait) 144'  -EM      Deviations/Abnormal Patterns (Gait) left sided deviations;antalgic;stride length decreased;gait speed decreased   valgus L LE from prior injury  -EM      Recorded by [EM] Boston Fried, PTA 09/03/18 1307      Row Name 09/03/18 1300             Positioning and Restraints    Pre-Treatment Position in bed  -EM      Post Treatment Position bed  -EM      In Bed supine;call light within reach;encouraged to call for assist;exit alarm on  -EM      Recorded by [EM] Boston Fried, PTA 09/03/18 1307      Row Name 09/03/18 1300             Pain Scale: Numbers Pre/Post-Treatment    Pain Scale: Numbers, Pretreatment 0/10 - no pain  -EM      Pain Scale: Numbers, Post-Treatment 0/10 - no pain  -EM      Recorded by [EM] Boston Fried, PTA 09/03/18 1307      Row Name 09/03/18 1300             Outcome Summary/Treatment Plan (PT)    Daily Summary of Progress (PT) progress toward functional goals as expected  -EM      Plan for Continued  Treatment (PT) Cont current POC with progression as amado/indicated  -EM      Anticipated Equipment Needs at Discharge (PT) bedside commode;gait belt;front wheeled walker  -EM      Anticipated Discharge Disposition (PT) home with 24/7 care;home with home health  -EM      Recorded by [EM] Boston Fried, PTA 09/03/18 1307        User Key  (r) = Recorded By, (t) = Taken By, (c) = Cosigned By    Initials Name Effective Dates Discipline    EM Boston Fried, PTA 08/11/15 -  PT                       PT Rehab Goals     Row Name 09/03/18 1036             Bed Mobility Goal 1 (PT)    Activity/Assistive Device (Bed Mobility Goal 1, PT) rolling to left;rolling to right;sit to supine;supine to sit  -EM      [place] Level/Cues Needed (Bed Mobility Goal 1, PT) contact guard assist  -EM      Time Frame (Bed Mobility Goal 1, PT) by discharge  -EM      Progress/Outcomes (Bed Mobility Goal 1, PT) goal partially met;continuing progress toward goal;goal ongoing  -EM         Transfer Goal 1 (PT)    Activity/Assistive Device (Transfer Goal 1, PT) sit-to-stand/stand-to-sit;bed-to-chair/chair-to-bed  -EM      [place] Level/Cues Needed (Transfer Goal 1, PT) conditional independence  -EM      Time Frame (Transfer Goal 1, PT) by discharge  -EM      Progress/Outcome (Transfer Goal 1, PT) goal not met;continuing progress toward goal;goal ongoing  -EM         Gait Training Goal 1 (PT)    Activity/Assistive Device (Gait Training Goal 1, PT) gait (walking locomotion);assistive device use;walker, rolling  -EM      [place] Level (Gait Training Goal 1, PT) supervision required;conditional independence  -EM      Distance (Gait Goal 1, PT) 150ft  -EM      Time Frame (Gait Training Goal 1, PT) by discharge  -EM      Progress/Outcome (Gait Training Goal 1, PT) goal not met  -EM         Strength Goal 1 (PT)    Strength Goal 1 (PT) Pt will tolerate 15 reps AROM exercises, OOB in chair  -EM      Time Frame (Strength Goal 1, PT) by discharge   -EM      Progress/Outcome (Strength Goal 1, PT) goal partially met;continuing progress toward goal;goal ongoing  -EM        User Key  (r) = Recorded By, (t) = Taken By, (c) = Cosigned By    Initials Name Provider Type Discipline    EM Boston Fried, PTA Physical Therapy Assistant PT          Physical Therapy Education     Title: PT OT SLP Therapies (Active)     Topic: Physical Therapy (Active)     Point: Mobility training (Active)    Learning Progress Summary     Learner Status Readiness Method Response Comment Documented by    Patient Active Acceptance E NR benefits of ambulation outside of room.  08/31/18 1430     Active Acceptance E NR   08/30/18 1345          Point: Body mechanics (Active)    Learning Progress Summary     Learner Status Readiness Method Response Comment Documented by    Patient Active Acceptance E NR   08/30/18 1345          Point: Precautions (Active)    Learning Progress Summary     Learner Status Readiness Method Response Comment Documented by    Patient Active Acceptance E NR benefits of ambulation outside of room.  08/31/18 1430     Active Acceptance E NR   08/30/18 1345                      User Key     Initials Effective Dates Name Provider Type Discipline     04/03/18 -  Janice Madrid, PT Physical Therapist PT     07/23/18 -  Asuncion Pickering, PT Physical Therapist PT                    PT Recommendation and Plan  Anticipated Discharge Disposition (PT): home with 24/7 care, home with home health  Therapy Frequency (PT Clinical Impression): daily  Outcome Summary/Treatment Plan (PT)  Daily Summary of Progress (PT): progress toward functional goals as expected  Plan for Continued Treatment (PT): Cont current POC with progression as amado/indicated  Anticipated Equipment Needs at Discharge (PT): bedside commode, gait belt, front wheeled walker  Anticipated Discharge Disposition (PT): home with 24/7 care, home with home health  Outcome Summary: Pt amado tx well with good participation  after min motivation/encouragement. Pt t/f sup-sit-sup with Min Ax1 to assist L LE, sit-stand-sit w/ Cond Ind, pt amb 144' with FWRW with SBAx1. No goals met this tx. Pt will benefit from home with 24/7 assist and  HHPT upon discharge.,          Outcome Measures     Row Name 09/03/18 1036 09/02/18 1508 09/02/18 1100       How much help from another person do you currently need...    Turning from your back to your side while in flat bed without using bedrails? 4  -EM 4  -JW  --    Moving from lying on back to sitting on the side of a flat bed without bedrails? 3  -EM 3  -JW  --    Moving to and from a bed to a chair (including a wheelchair)? 3  -EM 3  -JW  --    Standing up from a chair using your arms (e.g., wheelchair, bedside chair)? 3  -EM 3  -JW  --    Climbing 3-5 steps with a railing? 3  -EM 3  -JW  --    To walk in hospital room? 3  -EM 3  -JW  --    AM-[facility] 6 Clicks Score 19  -EM 19  -JW  --       How much help from another is currently needed...    Putting on and taking off regular lower body clothing?  --  -- 3  -JH    Bathing (including washing, rinsing, and drying)  --  -- 3  -JH    Toileting (which includes using toilet bed pan or urinal)  --  -- 3  -JH    Putting on and taking off regular upper body clothing  --  -- 3  -JH    Taking care of personal grooming (such as brushing teeth)  --  -- 3  -JH    Eating meals  --  -- 4  -JH    Score  --  -- 19  -JH       Functional Assessment    Outcome Measure Options -[facility] 6 Clicks Basic Mobility (PT)  -EM [facility] 6 Clicks Basic Mobility (PT)  -  --    Row Name 09/01/18 1015 08/31/18 1357          How much help from another person do you currently need...    Turning from your back to your side while in flat bed without using bedrails?  -- 4  -LF     Moving from lying on back to sitting on the side of a flat bed without bedrails?  -- 3  -LF     Moving to and from a bed to a chair (including a wheelchair)?  -- 3  -LF     Standing up from a chair using your arms  (e.g., wheelchair, bedside chair)?  -- 3  -LF     Climbing 3-5 steps with a railing?  -- 3  -LF     To walk in hospital room?  -- 3  -LF     AM-[facility] 6 Clicks Score  -- 19  -LF        How much help from another is currently needed...    Putting on and taking off regular lower body clothing? 3  -JH  --     Bathing (including washing, rinsing, and drying) 3  -JH  --     Toileting (which includes using toilet bed pan or urinal) 3  -JH  --     Putting on and taking off regular upper body clothing 3  -JH  --     Taking care of personal grooming (such as brushing teeth) 3  -JH  --     Eating meals 3  -JH  --     Score 18  -JH  --        Functional Assessment    Outcome Measure Options  -- AM-PAC 6 Clicks Basic Mobility (PT)  -LF       User Key  (r) = Recorded By, (t) = Taken By, (c) = Cosigned By    Initials Name Provider Type    EM Boston Fried, PTA Physical Therapy Assistant    Lorna Sun, PTA Physical Therapy Assistant    Narcisa Sequeira, ANTIONETTE/L Occupational Therapy Assistant    LF Asuncion Pickering, PT Physical Therapist           Time Calculation:         PT Charges     Row Name 09/03/18 1311             Time Calculation    Start Time 1036  -EM      Stop Time 1059  -EM      Time Calculation (min) 23 min  -EM         Time Calculation- PT    Total Timed Code Minutes- PT 23 minute(s)  -EM        User Key  (r) = Recorded By, (t) = Taken By, (c) = Cosigned By    Initials Name Provider Type    EM Boston Fried, PTA Physical Therapy Assistant        Therapy Suggested Charges     Code   Minutes Charges    91942 (CPT®) Hc Pt Neuromusc Re Education Ea 15 Min      25474 (CPT®) Hc Pt Ther Proc Ea 15 Min 21 1    97571 (CPT®) Hc Gait Training Ea 15 Min      29947 (CPT®) Hc Pt Therapeutic Act Ea 15 Min      47139 (CPT®) Hc Pt Manual Therapy Ea 15 Min      11942 (CPT®) Hc Pt Iontophoresis Ea 15 Min      88106 (CPT®) Hc Pt Elec Stim Ea-Per 15 Min      04870 (CPT®) Hc Pt Ultrasound Ea 15 Min      53099 (CPT®) Hc Pt Self  Care/Mgmt/Train Ea 15 Min      81206 (CPT®) Hc Pt Prosthetic (S) Train Initial Encounter, Each 15 Min      60058 (CPT®) Hc Pt Orthotic(S)/Prosthetic(S) Encounter, Each 15 Min      18451 (CPT®) Hc Orthotic(S) Mgmt/Train Initial Encounter, Each 15min      Total  21 1        Therapy Charges for Today     Code Description Service Date Service Provider Modifiers Qty    84444170517 HC PT THERAPEUTIC ACT EA 15 MIN 9/3/2018 Boston Fried, PTA GP 1    88535012785 HC GAIT TRAINING EA 15 MIN 9/3/2018 Boston Fried, PTA GP 1          PT G-Codes  PT Professional Judgement Used?: Yes  Outcome Measure Options: AM-PAC 6 Clicks Basic Mobility (PT)  Score: 19  Functional Limitation: Mobility: Walking and moving around  Mobility: Walking and Moving Around Current Status (): At least 20 percent but less than 40 percent impaired, limited or restricted  Mobility: Walking and Moving Around Goal Status (): At least 1 percent but less than 20 percent impaired, limited or restricted    Boston Fried, PTA  9/3/2018

## 2018-09-03 NOTE — THERAPY TREATMENT NOTE
Acute Care - Occupational Therapy Treatment Note  [facility]     Patient Name: Tiffanie Arroyo  : 9/10/1926  MRN: [id number]  Today's Date: 9/3/2018  Onset of Illness/Injury or Date of Surgery: 18  Date of Referral to OT: 18  Referring Physician: Dr. Derrell Reyes    Admit Date: 2018       ICD-10-CM ICD-9-CM   1. Acute renal insufficiency N28.9 593.9   2. Hypocalcemia E83.51 275.41   3. Weakness R53.1 780.79   4. Impaired functional mobility, balance, gait, and endurance Z74.09 V49.89   5. Impaired mobility and activities of daily living Z74.09 799.89   6. Primary osteoarthritis involving multiple joints M15.0 715.09   7. Heart valve disorder I38 424.90   8. Repeated falls R29.6 781.99   9. Hypothyroidism due to Hashimoto's thyroiditis E03.8 244.8    E06.3 245.2     Patient Active Problem List   Diagnosis   • Friction burn of skin   • Abrasion of right hand   • Acute congestive heart failure (CMS/HCC)   • Age-related physical debility   • Amblyopia   • Artificial lens present   • S/P CABG (coronary artery bypass graft)   • Degenerative joint disease involving multiple joints   • Dehydration   • Depressive disorder   • Dyslipidemia   • Ecchymosis   • Generalized anxiety disorder   • Glaucoma   • Heart valve disorder   • Malignant neoplasm of skin   • Nausea   • Nonrheumatic mitral valve insufficiency   • Nonrheumatic tricuspid (valve) stenosis   • Nuclear senile cataract   • Osteoporosis   • Secondary pulmonary hypertension   • Peripheral edema   • Primary open angle glaucoma   • Repeated falls   • Restless legs   • Subdural hematoma (CMS/HCC)   • Noninfected skin tear of right leg   • Atrial flutter (CMS/HCC)   • Cataract   • Pseudophakia   • Rectal bleed   • Chronic midline low back pain without sciatica   • Blood in stool   • Primary open angle glaucoma of both eyes, moderate stage   • Mixed hyperlipidemia   • Primary open angle glaucoma of both eyes, severe stage   • Chronic pain of left  knee   • Fall   • Hypothyroidism   • Closed fracture of acromial end of right clavicle   • Closed fracture of multiple ribs of right side   • Hypertension   • Neck pain   • Acute renal insufficiency   • Hypocalcemia     Past Medical History:   Diagnosis Date   • Acute congestive heart failure (CMS/HCC)    • Atrial flutter (CMS/HCC)    • Coronary arteriosclerosis    • Degenerative joint disease involving multiple joints    • Depressive disorder    • Generalized anxiety disorder    • Glaucoma    • Hypertension    • Nonrheumatic mitral valve insufficiency    • Nonrheumatic tricuspid valve disorder    • Osteoporosis    • Repeated falls    • Subdural hematoma (CMS/HCC)      Past Surgical History:   Procedure Laterality Date   • CORONARY ARTERY BYPASS GRAFT     • EXPLORATORY LAPAROTOMY     • HIP ARTHROPLASTY Left    • NECK LESION BIOPSY     • THYROIDECTOMY         Therapy Treatment          Rehabilitation Treatment Summary     Row Name 09/03/18 1300 09/03/18 0915          Treatment Time/Intention    Discipline physical therapy assistant  -EM occupational therapy assistant  -     Document Type therapy note (daily note)  -EM therapy note (daily note)  -     Mode of Treatment physical therapy;individual therapy  -EM individual therapy;occupational therapy  -     Patient/Family Observations dtr present initially   -EM pt's son and daughter present   -     Therapy Frequency (PT Clinical Impression) daily  -EM  --     Therapy Frequency (OT Eval)  -- other (see comments)   5-7 days a wk  -     Patient Effort good  -EM adequate  -     Existing Precautions/Restrictions fall   L LE weak  -EM  --     Recorded by [EM] Boston Fried, PTA 09/03/18 1307 [JH] Narcisa Leo COTA/L 09/03/18 1318     Row Name 09/03/18 1300             Vital Signs    Pre Systolic BP Rehab 114  -EM      Pre Treatment Diastolic BP 56  -EM      Post Systolic BP Rehab 160  -EM      Post Treatment Diastolic BP 67  -EM      Pretreatment Heart Rate  (beats/min) 62  -EM      Posttreatment Heart Rate (beats/min) 70  -EM      Pre SpO2 (%) 99  -EM      O2 Delivery Pre Treatment room air  -EM      Post SpO2 (%) 92  -EM      O2 Delivery Post Treatment room air  -EM      Pre Patient Position Supine  -EM      Intra Patient Position Standing  -EM      Post Patient Position Supine  -EM      Recorded by [EM] Boston Fried, PTA 09/03/18 1307      Row Name 09/03/18 1300 09/03/18 0915          Cognitive Assessment/Intervention- PT/OT    Affect/Mental Status (Cognitive) WNL  -EM WNL  -JH     Orientation Status (Cognition) oriented x 4  -EM oriented x 4  -JH     Follows Commands (Cognition) WFL  -EM  --     Cognitive Function (Cognitive) WNL;WFL  -EM  --     Personal Safety Interventions fall prevention program maintained;gait belt;nonskid shoes/slippers when out of bed;supervised activity  -EM  --     Recorded by [EM] Boston Fried, PTA 09/03/18 1307 [JH] Narcisa Leo COTA/L 09/03/18 1318     Row Name 09/03/18 1300             Bed Mobility Assessment/Treatment    Supine-Sit [place] (Bed Mobility) minimum assist (75% patient effort)  -EM      Sit-Supine [place] (Bed Mobility) minimum assist (75% patient effort)  -EM      Comment (Bed Mobility) assist required for L LE only  -EM      Recorded by [EM] Boston Fried, PTA 09/03/18 1307      Row Name 09/03/18 1300             Sit-Stand Transfer    Sit-Stand [place] (Transfers) conditional independence  -EM      Assistive Device (Sit-Stand Transfers) walker, front-wheeled  -EM      Recorded by [EM] Boston Fried, PTA 09/03/18 1307      Row Name 09/03/18 1300             Stand-Sit Transfer    Stand-Sit [place] (Transfers) conditional independence  -EM      Assistive Device (Stand-Sit Transfers) walker, front-wheeled  -EM      Recorded by [EM] Boston Fried, PTA 09/03/18 1307      Row Name 09/03/18 1300             Gait/Stairs Assessment/Training    [place] Level (Gait) supervision  -EM      Assistive  Device (Gait) walker, front-wheeled  -EM      Distance in Feet (Gait) 144'  -EM      Deviations/Abnormal Patterns (Gait) left sided deviations;antalgic;stride length decreased;gait speed decreased   valgus L LE from prior injury  -      Recorded by [EM] Boston Fried, WYATT 09/03/18 1307      Row Name 09/03/18 0915             Therapeutic Exercise    Upper Extremity Range of Motion (Therapeutic Exercise) shoulder flexion/extension, bilateral;shoulder horizontal abduction/adduction, bilateral;elbow flexion/extension, bilateral;forearm supination/pronation, bilateral  -      Hand (Therapeutic Exercise) finger flexion/extension, bilateral  -      Weight/Resistance (Therapeutic Exercise) 1 pound  -      Exercise Type (Therapeutic Exercise) AROM (active range of motion)  -      Position (Therapeutic Exercise) seated  -      Sets/Reps (Therapeutic Exercise) 1/15-20  -      Equipment (Therapeutic Exercise) free weight, barbell  -      Recorded by [JH] Narcisa Leo COTA/L 09/03/18 1318      Row Name 09/03/18 1300 09/03/18 0915          Positioning and Restraints    Pre-Treatment Position in bed  -EM sitting in chair/recliner  -     Post Treatment Position bed  -EM wheelchair  -JH     In Bed supine;call light within reach;encouraged to call for assist;exit alarm on  -EM  --     In Wheelchair  -- call light within reach;encouraged to call for assist;sitting;with family/caregiver  -     Recorded by [EM] Boston Fried, WYATT 09/03/18 1307 [JH] Narcisa Leo ADAN/L 09/03/18 1318     Row Name 09/03/18 1300 09/03/18 0915          Pain Scale: Numbers Pre/Post-Treatment    Pain Scale: Numbers, Pretreatment 0/10 - no pain  -EM 0/10 - no pain  -     Pain Scale: Numbers, Post-Treatment 0/10 - no pain  -EM 0/10 - no pain  -     Recorded by [EM] Boston Fried, PTA 09/03/18 1307 [JH] Narcisa Leo ADAN/L 09/03/18 1318     Row Name 09/03/18 0915             Outcome Summary/Treatment Plan (OT)    Daily  Summary of Progress (OT) progress toward functional goals is good  -      Plan for Continued Treatment (OT) Continue per POC  -JH      Anticipated Discharge Disposition (OT) home with 24/7 care;home with home health  -      Recorded by [] Narcisa Leo COTA/L 09/03/18 1318      Row Name 09/03/18 1300             Outcome Summary/Treatment Plan (PT)    Daily Summary of Progress (PT) progress toward functional goals as expected  -EM      Plan for Continued Treatment (PT) Cont current POC with progression as amado/indicated  -EM      Anticipated Equipment Needs at Discharge (PT) bedside commode;gait belt;front wheeled walker  -EM      Anticipated Discharge Disposition (PT) home with 24/7 care;home with home health  -      Recorded by [EM] Boston Fried, PTA 09/03/18 1307        User Key  (r) = Recorded By, (t) = Taken By, (c) = Cosigned By    Initials Name Effective Dates Discipline    EM Boston Fried, PTA 08/11/15 -  PT     Narcisa Leo ADAN/L 03/07/18 -  OT                   OT Rehab Goals     Row Name 09/03/18 0915             Transfer Goal 1 (OT)    Activity/Assistive Device (Transfer Goal 1, OT) transfers, all;rollator;walker, rolling  -      [place] Level/Cues Needed (Transfer Goal 1, OT) supervision required  -      Time Frame (Transfer Goal 1, OT) long term goal (LTG);by discharge  -      Progress/Outcome (Transfer Goal 1, OT) goal not met  -         Bathing Goal 1 (OT)    Activity/Assistive Device (Bathing Goal 1, OT) bathing skills, all;long-handled sponge;reacher  -      [place] Level/Cues Needed (Bathing Goal 1, OT) set-up required;supervision required  -      Time Frame (Bathing Goal 1, OT) long term goal (LTG);by discharge  -      Progress/Outcomes (Bathing Goal 1, OT) goal not met  -         Dressing Goal 1 (OT)    Activity/Assistive Device (Dressing Goal 1, OT) dressing skills, all;long handled shoe horn;reacher;sock-aid  -      [place]/Cues Needed  (Dressing Goal 1, OT) set-up required;supervision required  -      Time Frame (Dressing Goal 1, OT) long term goal (LTG);by discharge  -      Progress/Outcome (Dressing Goal 1, OT) goal not met  -         Toileting Goal 1 (OT)    Activity/Device (Toileting Goal 1, OT) toileting skills, all;commode, bedside with drop arms;raised toilet seat  -      [place] Level/Cues Needed (Toileting Goal 1, OT) set-up required;supervision required  -      Time Frame (Toileting Goal 1, OT) long term goal (LTG);by discharge  -      Progress/Outcome (Toileting Goal 1, OT) goal not met  -        User Key  (r) = Recorded By, (t) = Taken By, (c) = Cosigned By    Initials Name Provider Type Discipline    Narcisa Sequeira COTA/L Occupational Therapy Assistant OT        Occupational Therapy Education     Title: PT OT SLP Therapies (Active)     Topic: Occupational Therapy (Done)     Point: ADL training (Done)     Description: Instruct learner(s) on proper safety adaptation and remediation techniques during self care or transfers.   Instruct in proper use of assistive devices.   Learning Progress Summary     Learner Status Readiness Method Response Comment Documented by    Patient Done Acceptance E [facility] 09/02/18 1421    Family Done Acceptance E [facility] 09/02/18 1421          Point: Home exercise program (Done)     Description: Instruct learner(s) on appropriate technique for monitoring, assisting and/or progressing therapeutic exercises/activities.   Learning Progress Summary     Learner Status Readiness Method Response Comment Documented by    Patient Done Acceptance E [facility] 09/02/18 1421    Family Done Acceptance E [facility] 09/02/18 1421          Point: Precautions (Done)     Description: Instruct learner(s) on prescribed precautions during self-care and functional transfers.   Learning Progress Summary     Learner Status Readiness Method Response Comment Documented by    Patient Done Acceptance E [facility] 09/02/18 1421      Done Acceptance E,TB VU Role of OT and POC. Benefit of activity and addressed safety concerns regarding d/c home MR 08/31/18 1141    Family Done Acceptance E [facility] 09/02/18 1421          Point: Body mechanics (Done)     Description: Instruct learner(s) on proper positioning and spine alignment during self-care, functional mobility activities and/or exercises.   Learning Progress Summary     Learner Status Readiness Method Response Comment Documented by    Patient Done Acceptance E VU   09/02/18 1421     Done Acceptance E,TB VU Role of OT and POC. Benefit of activity and addressed safety concerns regarding d/c home  08/31/18 1141    Family Done Acceptance E [facility] 09/02/18 1421                      User Key     Initials Effective Dates Name Provider Type Discipline     03/07/18 -  Narcisa Leo COTA/L Occupational Therapy Assistant OT     04/03/18 -  Melissa Hodges, OT Occupational Therapist OT                OT Recommendation and Plan  Outcome Summary/Treatment Plan (OT)  Daily Summary of Progress (OT): progress toward functional goals is good  Plan for Continued Treatment (OT): Continue per POC  Anticipated Discharge Disposition (OT): home with 24/7 care, home with home health  Therapy Frequency (OT Eval): other (see comments) (5-7 days a wk)  Daily Summary of Progress (OT): progress toward functional goals is good  Outcome Summary: Pt completed B UE ther/ex while seated up in w/c, pt has not met any new goals this date, home with  OT/PT upon d/c         Outcome Measures     Row Name 09/03/18 1036 09/03/18 0915 09/02/18 1508       How much help from another person do you currently need...    Turning from your back to your side while in flat bed without using bedrails? 4  -EM  -- 4  -JW    Moving from lying on back to sitting on the side of a flat bed without bedrails? 3  -EM  -- 3  -JW    Moving to and from a bed to a chair (including a wheelchair)? 3  -EM  -- 3  -JW    Standing up from a  chair using your arms (e.g., wheelchair, bedside chair)? 3  -EM  -- 3  -JW    Climbing 3-5 steps with a railing? 3  -EM  -- 3  -JW    To walk in hospital room? 3  -EM  -- 3  -JW    AM-PAC 6 Clicks Score 19  -EM  -- 19  -JW       How much help from another is currently needed...    Putting on and taking off regular lower body clothing?  -- 3  -JH  --    Bathing (including washing, rinsing, and drying)  -- 3  -JH  --    Toileting (which includes using toilet bed pan or urinal)  -- 3  -JH  --    Putting on and taking off regular upper body clothing  -- 3  -JH  --    Taking care of personal grooming (such as brushing teeth)  -- 3  -JH  --    Eating meals  -- 4  -JH  --    Score  -- 19  -JH  --       Functional Assessment    Outcome Measure Options -[facility] 6 Clicks Basic Mobility (PT)  -  -- [facility] 6 Clicks Basic Mobility (PT)  -    Row Name 09/02/18 1100 09/01/18 1015 08/31/18 1357       How much help from another person do you currently need...    Turning from your back to your side while in flat bed without using bedrails?  --  -- 4  -LF    Moving from lying on back to sitting on the side of a flat bed without bedrails?  --  -- 3  -LF    Moving to and from a bed to a chair (including a wheelchair)?  --  -- 3  -LF    Standing up from a chair using your arms (e.g., wheelchair, bedside chair)?  --  -- 3  -LF    Climbing 3-5 steps with a railing?  --  -- 3  -LF    To walk in hospital room?  --  -- 3  -LF    AM-[facility] 6 Clicks Score  --  -- 19  -LF       How much help from another is currently needed...    Putting on and taking off regular lower body clothing? 3  -JH 3  -JH  --    Bathing (including washing, rinsing, and drying) 3  -JH 3  -JH  --    Toileting (which includes using toilet bed pan or urinal) 3  -JH 3  -JH  --    Putting on and taking off regular upper body clothing 3  -JH 3  -JH  --    Taking care of personal grooming (such as brushing teeth) 3  -JH 3  -JH  --    Eating meals 4  -JH 3  -JH  --    Score 19   - 18  -  --       Functional Assessment    Outcome Measure Options  --  -- AM-PAC 6 Clicks Basic Mobility (PT)  -LF      User Key  (r) = Recorded By, (t) = Taken By, (c) = Cosigned By    Initials Name Provider Type    Boston Lopez, PTA Physical Therapy Assistant    Lorna Sun, PTA Physical Therapy Assistant     Narcisa Leo, ADAN/L Occupational Therapy Assistant    Asuncion Centeno, PT Physical Therapist           Time Calculation:         Time Calculation- OT     Row Name 09/03/18 1323             Time Calculation- OT    OT Start Time 0915  -      OT Stop Time 0958  -      OT Time Calculation (min) 43 min  -      OT Received On 09/03/18  -        User Key  (r) = Recorded By, (t) = Taken By, (c) = Cosigned By    Initials Name Provider Type     Narcisa Leo, ADAN/L Occupational Therapy Assistant           Therapy Suggested Charges     Code   Minutes Charges    None           Therapy Charges for Today     Code Description Service Date Service Provider Modifiers Qty    97738724230 HC OT THERAPEUTIC ACT EA 15 MIN 9/2/2018 Narcisa Leo ADAN/L GO 2    78530157262 HC OT SELF CARE/MGMT/TRAIN EA 15 MIN 9/2/2018 Narcisa Leo ADAN/L GO 2    88370401652 HC OT THERAPEUTIC ACT EA 15 MIN 9/3/2018 Narcisa Leo ADAN/L GO 1    27792810056 HC OT WHEELCHAIR MGMT EA 15 MIN 9/3/2018 Narcisa Leo ADAN/L GO 1    21135157348 HC OT THER PROC EA 15 MIN 9/3/2018 Narcisa Leo ADAN/L GO 1          OT G-codes  OT Professional Judgement Used?: Yes  OT Functional Scales Options: AM-PAC 6 Clicks Daily Activity (OT)  Score: 18  Functional Limitation: Self care  Self Care Current Status (): At least 40 percent but less than 60 percent impaired, limited or restricted  Self Care Goal Status (): At least 20 percent but less than 40 percent impaired, limited or restricted    [place]/GIGI  9/3/2018

## 2018-09-03 NOTE — PLAN OF CARE
Problem: Patient Care Overview  Goal: Plan of Care Review  Outcome: Ongoing (interventions implemented as appropriate)   09/03/18 0332 09/03/18 1309   Coping/Psychosocial   Plan of Care Reviewed With patient --    Plan of Care Review   Progress improving --    OTHER   Outcome Summary --  Pt amado tx well with good participation after min motivation/encouragement. Pt t/f sup-sit-sup with Min Ax1 to assist L LE, sit-stand-sit w/ Cond Ind, pt amb 144' with FWRW with SBAx1. No goals met this tx. Pt will benefit from home with 24/7 assist and HHPT upon discharge.,

## 2018-09-03 NOTE — PLAN OF CARE
Problem: Patient Care Overview  Goal: Plan of Care Review  Outcome: Ongoing (interventions implemented as appropriate)   09/03/18 0332 09/03/18 0915   Coping/Psychosocial   Plan of Care Reviewed With patient --    Plan of Care Review   Progress improving --    OTHER   Outcome Summary --  Pt completed B UE ther/ex while seated up in w/c, pt has not met any new goals this date, home with  OT/PT upon d/c

## 2018-09-03 NOTE — PROGRESS NOTES
[facility] Medicine Services  INPATIENT PROGRESS NOTE    Length of Stay: 5  Date of Admission: 8/28/2018  Primary Care Physician: Abilio Bhardwaj MD    Subjective   Chief Complaint: No new complaints.  HPI:  Patient is seen for follow-up today.  She is doing better, remains deconditioned but voices no new complaints.    Review of Systems   Constitutional: Positive for activity change and fatigue. Negative for appetite change, chills, diaphoresis and fever.   HENT: Negative for trouble swallowing and voice change.    Eyes: Negative for photophobia and visual disturbance.   Respiratory: Negative for cough, choking, chest tightness, shortness of breath, wheezing and stridor.    Cardiovascular: Negative for chest pain, palpitations and leg swelling.   Gastrointestinal: Negative for abdominal distention, abdominal pain, blood in stool, constipation, diarrhea, nausea and vomiting.   Endocrine: Negative for cold intolerance, heat intolerance, polydipsia, polyphagia and polyuria.   Genitourinary: Negative for decreased urine volume, difficulty urinating, dysuria, enuresis, flank pain, frequency, hematuria and urgency.   Musculoskeletal: Positive for gait problem. Negative for arthralgias, myalgias, neck pain and neck stiffness.   Skin: Positive for color change. Negative for pallor, rash and wound.   Neurological: Positive for weakness. Negative for dizziness, tremors, seizures, syncope, facial asymmetry, speech difficulty, light-headedness, numbness and headaches.   Hematological: Bruises/bleeds easily.   Psychiatric/Behavioral: Negative for agitation, behavioral problems and confusion.       Objective    Temp:  [96.3 °F (35.7 °C)-99.1 °F (37.3 °C)] 99.1 °F (37.3 °C)  Heart Rate:  [] 56  Resp:  [16-18] 18  BP: (107-162)/(55-80) 107/55    Physical Exam   Constitutional: She is oriented to person, place, and time. She appears well-developed and well-nourished. She is  cooperative. No distress.   HENT:   Head: Normocephalic and atraumatic.   Right Ear: External ear normal.   Left Ear: External ear normal.   Nose: Nose normal.   Mouth/Throat: Oropharynx is clear and moist.   Eyes: Pupils are equal, round, and reactive to light. Conjunctivae and EOM are normal.   Neck: Normal range of motion. Neck supple. No JVD present. No thyromegaly present.   Cardiovascular: Normal rate, regular rhythm, normal heart sounds and intact distal pulses.  Exam reveals no gallop and no friction rub.    No murmur heard.  Pulmonary/Chest: Effort normal and breath sounds normal. No stridor. No respiratory distress. She has no wheezes. She has no rales. She exhibits no tenderness.   Abdominal: Soft. Bowel sounds are normal. She exhibits no distension and no mass. There is no tenderness. There is no rebound and no guarding. No hernia.   Musculoskeletal: Normal range of motion. She exhibits no edema, tenderness or deformity.   Neurological: She is alert and oriented to person, place, and time. She has normal reflexes. No sensory deficit. She exhibits normal muscle tone.   Skin: Skin is warm and dry. No rash noted. She is not diaphoretic. No erythema. No pallor.   She has chronic venous stasis changes with some erythema on both legs.   Psychiatric: She has a normal mood and affect. Her behavior is normal. Judgment and thought content normal.   Nursing note and vitals reviewed.        Medication Review:    Current Facility-Administered Medications:   •  acetaminophen (TYLENOL) tablet 650 mg, 650 mg, Oral, Q4H PRN, Derrell Reyes MD, 650 mg at 09/03/18 1257  •  aspirin EC tablet 81 mg, 81 mg, Oral, Daily, Derrell Reyes MD, 81 mg at 09/03/18 0845  •  brimonidine (ALPHAGAN) 0.15 % ophthalmic solution 1 drop, 1 drop, Both Eyes, TID, Miguel Goldstein MD, 1 drop at 09/03/18 0846  •  calcium carbonate (TUMS) chewable tablet 500 mg (200 mg elemental), 1 tablet, Oral, BID, Rg Arteaga MD, 1 tablet at 09/03/18  0849  •  carvedilol (COREG) tablet 12.5 mg, 12.5 mg, Oral, BID With Meals, Derrell Reyes MD, 12.5 mg at 09/03/18 0845  •  cholecalciferol (VITAMIN D3) tablet 5,000 Units, 5,000 Units, Oral, Daily, Rg Arteaga MD, 5,000 Units at 09/03/18 0845  •  docusate sodium (COLACE) capsule 100 mg, 100 mg, Oral, BID, Derrell Reyes MD, 100 mg at 09/02/18 0728  •  famotidine (PEPCID) tablet 40 mg, 40 mg, Oral, Daily, Derrell Reyes MD, 40 mg at 09/03/18 0845  •  latanoprost (XALATAN) 0.005 % ophthalmic solution 1 drop, 1 drop, Both Eyes, Nightly, Miguel Goldstein MD, 1 drop at 09/02/18 2026  •  levothyroxine (SYNTHROID, LEVOTHROID) tablet 112 mcg, 112 mcg, Oral, Q AM, Derrell Reyes MD, 112 mcg at 09/03/18 0514  •  magic butt ointment, , Topical, PRN, Derrell Reyes MD  •  megestrol (MEGACE) 40 MG/ML suspension 400 mg, 400 mg, Oral, BID, Derrell Reyes MD, 400 mg at 09/03/18 0845  •  mirtazapine (REMERON) tablet 15 mg, 15 mg, Oral, Nightly, Derrell Reyes MD, 15 mg at 09/02/18 2025  •  ondansetron (ZOFRAN) injection 4 mg, 4 mg, Intravenous, Q6H PRN, Derrell Reyes MD  •  rOPINIRole (REQUIP) tablet 0.5 mg, 0.5 mg, Oral, Q PM, Derrell Reyes MD, 0.5 mg at 09/02/18 1710  •  sodium chloride 0.9 % flush 1-10 mL, 1-10 mL, Intravenous, PRN, Derrell Reyes MD  •  sodium chloride 0.9 % flush 10 mL, 10 mL, Intravenous, PRN, Tripp Page MD, 10 mL at 08/28/18 3088    Results Review:  I have reviewed the labs, radiology results, and diagnostic studies.    Laboratory Data:     Results from last 7 days  Lab Units 09/03/18  0719 09/02/18  0540 09/01/18  0628  08/28/18  1418   SODIUM mmol/L 137 137 139  < > 139   POTASSIUM mmol/L 4.3 4.6 4.1  < > 4.2   CHLORIDE mmol/L 113* 114* 116*  < > 109   CO2 mmol/L 17.0* 16.0* 17.0*  < > 17.0*   BUN mg/dL 16 19 18  < > 48*   CREATININE mg/dL 0.83 0.79 0.86  < > 1.60*   GLUCOSE mg/dL 87 96 91  < > 102*   CALCIUM mg/dL 9.7 9.7 8.9  < > 5.6*   BILIRUBIN mg/dL  --   --   --   --  0.5   ALK PHOS  U/L  --   --   --   --  73   ALT (SGPT) U/L  --   --   --   --  20   AST (SGOT) U/L  --   --   --   --  21   ANION GAP mmol/L 7.0 7.0 6.0  < > 13.0   < > = values in this interval not displayed.  Estimated Creatinine Clearance: 33.3 mL/min (by C-G formula based on SCr of 0.83 mg/dL).    Results from last 7 days  Lab Units 08/31/18  0851 08/29/18  0622 08/28/18  1418   MAGNESIUM mg/dL 2.0  --  1.9   PHOSPHORUS mg/dL  --  3.9  --            Results from last 7 days  Lab Units 09/03/18  0719 09/02/18  0540 09/01/18  0628 08/31/18  1037 08/30/18  0524   WBC 10*3/mm3 13.28* 12.73* 14.78* 11.65* 8.73   HEMOGLOBIN g/dL 11.3* 10.9* 12.3 11.8* 11.6*   HEMATOCRIT % 32.9* 31.6* 36.2 34.5* 34.5*   PLATELETS 10*3/mm3 190 259 233 217 238       Results from last 7 days  Lab Units 08/28/18  1418   INR  1.29*       Culture Data:   Blood Culture   Date Value Ref Range Status   09/01/2018 No growth at 2 days  Preliminary   09/01/2018 No growth at 2 days  Preliminary     No results found for: URINECX  No results found for: RESPCX  No results found for: WOUNDCX  No results found for: STOOLCX  No components found for: BODYFLD    Radiology Data:   Imaging Results (last 24 hours)     ** No results found for the last 24 hours. **          I have reviewed the patient's current medications.     Assessment/Plan     Hospital Problem List:  Active Problems:   - Acute renal insufficiency: Resolved his creatinine is down to 0.83.  Input by nephrologist is appreciated.  - Hypocalcemia: Resolved.  Continue vitamin D and calcium supplements.  - Hypertension: Stable.  - History of CHF: Stable and patient is euvolemic.  - Hypothyroidism: Continue Synthroid.  - Glaucoma: Continue home medications.  - Leukocytosis is likely reactive.  No evidence of infection.  Will continue to monitor.  - Deconditioning: Continue PT and OT.  - Continue GI and DVT prophylaxis.        Discharge Planning: Likely discharge in [place].    Jb Stokes MD   09/03/18   1:14  PM

## 2018-09-03 NOTE — PLAN OF CARE
Problem: Fall Risk (Adult)  Goal: Identify Related Risk Factors and Signs and Symptoms  Outcome: Ongoing (interventions implemented as appropriate)    Goal: Absence of Fall  Outcome: Ongoing (interventions implemented as appropriate)      Problem: Patient Care Overview  Goal: Plan of Care Review  Outcome: Ongoing (interventions implemented as appropriate)   09/03/18 0332   Coping/Psychosocial   Plan of Care Reviewed With patient   Plan of Care Review   Progress improving   OTHER   Outcome Summary pt vs stable, no new events overnight will continue to monitor.     Goal: Individualization and Mutuality  Outcome: Ongoing (interventions implemented as appropriate)    Goal: Discharge Needs Assessment  Outcome: Ongoing (interventions implemented as appropriate)    Goal: Interprofessional Rounds/Family Conf  Outcome: Ongoing (interventions implemented as appropriate)      Problem: Skin Injury Risk (Adult)  Goal: Identify Related Risk Factors and Signs and Symptoms  Outcome: Ongoing (interventions implemented as appropriate)    Goal: Skin Health and Integrity  Outcome: Ongoing (interventions implemented as appropriate)

## 2018-09-04 NOTE — THERAPY DISCHARGE NOTE
Acute Care - Physical Therapy Discharge Summary  [facility]       Patient Name: Tiffanie Arroyo  : 9/10/1926  MRN: [id number]    Today's Date: 2018  Onset of Illness/Injury or Date of Surgery: 18    Date of Referral to PT: 18  Referring Physician: Dr. Derrell Reyes      Admit Date: 2018      PT Recommendation and Plan    Visit Dx:    ICD-10-CM ICD-9-CM   1. Acute renal insufficiency N28.9 593.9   2. Hypocalcemia E83.51 275.41   3. Weakness R53.1 780.79   4. Impaired functional mobility, balance, gait, and endurance Z74.09 V49.89   5. Impaired mobility and activities of daily living Z74.09 799.89   6. Primary osteoarthritis involving multiple joints M15.0 715.09   7. Heart valve disorder I38 424.90   8. Repeated falls R29.6 781.99   9. Hypothyroidism due to Hashimoto's thyroiditis E03.8 244.8    E06.3 245.2             Outcome Measures     Row Name 18 1011 18 1036 18 0915       How much help from another person do you currently need...    Turning from your back to your side while in flat bed without using bedrails? 4  -COLE 4  -EM  --    Moving from lying on back to sitting on the side of a flat bed without bedrails? 3  -COLE 3  -EM  --    Moving to and from a bed to a chair (including a wheelchair)? 3  -COLE 3  -EM  --    Standing up from a chair using your arms (e.g., wheelchair, bedside chair)? 3  -COLE 3  -EM  --    Climbing 3-5 steps with a railing? 3  -COLE 3  -EM  --    To walk in hospital room? 3  -COLE 3  -EM  --    AM-PAC 6 Clicks Score 19  -COLE 19  -EM  --       How much help from another is currently needed...    Putting on and taking off regular lower body clothing?  --  -- 3  -JH    Bathing (including washing, rinsing, and drying)  --  -- 3  -JH    Toileting (which includes using toilet bed pan or urinal)  --  -- 3  -JH    Putting on and taking off regular upper body clothing  --  -- 3  -JH    Taking care of personal grooming (such as brushing teeth)  --  -- 3  -JH     Eating meals  --  -- 4  -    Score  --  -- 19  -       Functional Assessment    Outcome Measure Options -[facility] 6 Clicks Basic Mobility (PT)  - AM-[facility] 6 Clicks Basic Mobility (PT)  -  --    Row Name 09/02/18 1508 09/02/18 1100          How much help from another person do you currently need...    Turning from your back to your side while in flat bed without using bedrails? 4  -JW  --     Moving from lying on back to sitting on the side of a flat bed without bedrails? 3  -JW  --     Moving to and from a bed to a chair (including a wheelchair)? 3  -JW  --     Standing up from a chair using your arms (e.g., wheelchair, bedside chair)? 3  -JW  --     Climbing 3-5 steps with a railing? 3  -JW  --     To walk in hospital room? 3  -JW  --     AM-[facility] 6 Clicks Score 19  -JW  --        How much help from another is currently needed...    Putting on and taking off regular lower body clothing?  -- 3  -JH     Bathing (including washing, rinsing, and drying)  -- 3  -JH     Toileting (which includes using toilet bed pan or urinal)  -- 3  -JH     Putting on and taking off regular upper body clothing  -- 3  -JH     Taking care of personal grooming (such as brushing teeth)  -- 3  -JH     Eating meals  -- 4  -     Score  -- 19  -        Functional Assessment    Outcome Measure Options -[facility] 6 Clicks Basic Mobility (PT)  -  --       User Key  (r) = Recorded By, (t) = Taken By, (c) = Cosigned By    Initials Name Provider Type    EM Boston Fried, PTA Physical Therapy Assistant     Lorna Cox, PTA Physical Therapy Assistant    Ed Haq, PTA Physical Therapy Assistant     Narcisa Leo, ANTIONETTE/L Occupational Therapy Assistant                PT Charges     Row Name 09/04/18 1245             Time Calculation    Start Time 1011  -      Stop Time 1041  -COLE      Time Calculation (min) 30 min  -COLE         Time Calculation- PT    Total Timed Code Minutes- PT 30 minute(s)  -        User Key  (r) =  Recorded By, (t) = Taken By, (c) = Cosigned By    Initials Name Provider Type    Ed Haq, PTA Physical Therapy Assistant        Therapy Suggested Charges     Code   Minutes Charges    86138 (CPT®) Hc Pt Neuromusc Re Education Ea 15 Min      87578 (CPT®) Hc Pt Ther Proc Ea 15 Min 21 1    17305 (CPT®) Hc Gait Training Ea 15 Min      62491 (CPT®) Hc Pt Therapeutic Act Ea 15 Min      95362 (CPT®) Hc Pt Manual Therapy Ea 15 Min      01096 (CPT®) Hc Pt Iontophoresis Ea 15 Min      40853 (CPT®) Hc Pt Elec Stim Ea-Per 15 Min      39866 (CPT®) Hc Pt Ultrasound Ea 15 Min      07810 (CPT®) Hc Pt Self Care/Mgmt/Train Ea 15 Min      63306 (CPT®) Hc Pt Prosthetic (S) Train Initial Encounter, Each 15 Min      61574 (CPT®) Hc Pt Orthotic(S)/Prosthetic(S) Encounter, Each 15 Min      24257 (CPT®) Hc Orthotic(S) Mgmt/Train Initial Encounter, Each 15min      Total  21 1                PT Rehab Goals     Row Name 09/04/18 1011             Bed Mobility Goal 1 (PT)    Activity/Assistive Device (Bed Mobility Goal 1, PT) rolling to left;rolling to right;sit to supine;supine to sit  -COLE      [place] Level/Cues Needed (Bed Mobility Goal 1, PT) contact guard assist  -COLE      Time Frame (Bed Mobility Goal 1, PT) by discharge  -COLE      Progress/Outcomes (Bed Mobility Goal 1, PT) goal partially met;continuing progress toward goal;goal ongoing  -COLE         Transfer Goal 1 (PT)    Activity/Assistive Device (Transfer Goal 1, PT) sit-to-stand/stand-to-sit;bed-to-chair/chair-to-bed  -COLE      [place] Level/Cues Needed (Transfer Goal 1, PT) conditional independence  -COLE      Time Frame (Transfer Goal 1, PT) by discharge  -COLE      Progress/Outcome (Transfer Goal 1, PT) goal not met;continuing progress toward goal;goal ongoing  -COLE         Gait Training Goal 1 (PT)    Activity/Assistive Device (Gait Training Goal 1, PT) gait (walking locomotion);assistive device use;walker, rolling  -COLE      [place] Level (Gait Training Goal 1,  PT) supervision required;conditional independence  -COLE      Distance (Gait Goal 1, PT) 150ft  -COLE      Time Frame (Gait Training Goal 1, PT) by discharge  -COLE      Progress/Outcome (Gait Training Goal 1, PT) goal not met  -COLE         Strength Goal 1 (PT)    Strength Goal 1 (PT) Pt will tolerate 15 reps AROM exercises, OOB in chair  -COLE      Time Frame (Strength Goal 1, PT) by discharge  -COLE      Progress/Outcome (Strength Goal 1, PT) goal partially met;continuing progress toward goal;goal ongoing  -COLE        User Key  (r) = Recorded By, (t) = Taken By, (c) = Cosigned By    Initials Name Provider Type Discipline    Ed Haq, PTA Physical Therapy Assistant PT              PT Discharge Summary  Anticipated Discharge Disposition (PT): home with home health  Reason for Discharge: Discharge from facility, Per MD order  Outcomes Achieved: Unable to make functional progress toward goals at this time  Discharge Destination: SNF      Divina Vidal, PT   9/4/2018

## 2018-09-04 NOTE — THERAPY TREATMENT NOTE
Acute Care - Physical Therapy Treatment Note  [facility]     Patient Name: Tiffanie Arroyo  : 9/10/1926  MRN: [id number]  Today's Date: 2018  Onset of Illness/Injury or Date of Surgery: 18  Date of Referral to PT: 18  Referring Physician: Dr. Derrell Reyes    Admit Date: 2018    Visit Dx:    ICD-10-CM ICD-9-CM   1. Acute renal insufficiency N28.9 593.9   2. Hypocalcemia E83.51 275.41   3. Weakness R53.1 780.79   4. Impaired functional mobility, balance, gait, and endurance Z74.09 V49.89   5. Impaired mobility and activities of daily living Z74.09 799.89   6. Primary osteoarthritis involving multiple joints M15.0 715.09   7. Heart valve disorder I38 424.90   8. Repeated falls R29.6 781.99   9. Hypothyroidism due to Hashimoto's thyroiditis E03.8 244.8    E06.3 245.2     Patient Active Problem List   Diagnosis   • Friction burn of skin   • Abrasion of right hand   • Acute congestive heart failure (CMS/HCC)   • Age-related physical debility   • Amblyopia   • Artificial lens present   • S/P CABG (coronary artery bypass graft)   • Degenerative joint disease involving multiple joints   • Dehydration   • Depressive disorder   • Dyslipidemia   • Ecchymosis   • Generalized anxiety disorder   • Glaucoma   • Heart valve disorder   • Malignant neoplasm of skin   • Nausea   • Nonrheumatic mitral valve insufficiency   • Nonrheumatic tricuspid (valve) stenosis   • Nuclear senile cataract   • Osteoporosis   • Secondary pulmonary hypertension   • Peripheral edema   • Primary open angle glaucoma   • Repeated falls   • Restless legs   • Subdural hematoma (CMS/HCC)   • Noninfected skin tear of right leg   • Atrial flutter (CMS/HCC)   • Cataract   • Pseudophakia   • Rectal bleed   • Chronic midline low back pain without sciatica   • Blood in stool   • Primary open angle glaucoma of both eyes, moderate stage   • Mixed hyperlipidemia   • Primary open angle glaucoma of both eyes, severe stage   • Chronic pain of  left knee   • Fall   • Hypothyroidism   • Closed fracture of acromial end of right clavicle   • Closed fracture of multiple ribs of right side   • Hypertension   • Neck pain   • Acute renal insufficiency   • Hypocalcemia       Therapy Treatment          Rehabilitation Treatment Summary     Row Name 09/04/18 1011             Treatment Time/Intention    Discipline physical therapy assistant  -COLE      Document Type therapy note (daily note)  -COLE      Mode of Treatment physical therapy;individual therapy  -COLE      Therapy Frequency (PT Clinical Impression) daily  -COLE      Patient Effort good  -COLE      Comment pt asleep upon entry and required some encouragement for participation.   -COLE      Existing Precautions/Restrictions fall   L LE weak  -COLE      Recorded by [COLE] Ed Jackson, PTA 09/04/18 1242      Row Name 09/04/18 1011             Vital Signs    Pre Systolic BP Rehab 87   98/38 manually  -COLE      Pre Treatment Diastolic BP 37  -COLE      Intra Systolic BP Rehab 114   EOB  -COLE      Intra Treatment Diastolic BP 58  -COLE      Post Systolic BP Rehab 152  -COLE      Post Treatment Diastolic BP 61  -COLE      Posttreatment Heart Rate (beats/min) 59  -COLE      Post SpO2 (%) 98  -COLE      O2 Delivery Post Treatment room air  -COLE      Pre Patient Position Supine  -COLE      Post Patient Position Supine   pt declined OOB at this time.   -COLE      Recorded by [COLE] Ed Jackson, PTA 09/04/18 1242      Row Name 09/04/18 1011             Cognitive Assessment/Intervention- PT/OT    Affect/Mental Status (Cognitive) WNL  -COLE      Orientation Status (Cognition) oriented x 4  -COLE      Follows Commands (Cognition) WFL  -COLE      Cognitive Function (Cognitive) WNL;WFL  -COLE      Personal Safety Interventions gait belt;muscle strengthening facilitated;nonskid shoes/slippers when out of bed;supervised activity  -COLE      Recorded by [COLE] Ed Jackson, WYATT 09/04/18 1242      Row Name 09/04/18 1011             Bed Mobility  Assessment/Treatment    Bed Mobility Assessment/Treatment supine-sit;sit-supine  -COLE      Supine-Sit [place] (Bed Mobility) supervision  -COLE      Sit-Supine [place] (Bed Mobility) minimum assist (75% patient effort)  -COLE      Assistive Device (Bed Mobility) bed rails;head of bed elevated  -COLE      Recorded by [COLE] Ed Jackson, PTA 09/04/18 1242      Row Name 09/04/18 1011             Sit-Stand Transfer    Sit-Stand [place] (Transfers) supervision  -COLE      Assistive Device (Sit-Stand Transfers) walker, front-wheeled  -COLE      Recorded by [COLE] Ed Jackson, PTA 09/04/18 1242      Row Name 09/04/18 1011             Stand-Sit Transfer    Stand-Sit [place] (Transfers) verbal cues  -COLE      Assistive Device (Stand-Sit Transfers) walker, front-wheeled  -COLE      Recorded by [COLE] Ed Jackson, PTA 09/04/18 1242      Row Name 09/04/18 1011             Gait/Stairs Assessment/Training    Gait/Stairs Assessment/Training gait/ambulation independence;gait/ambulation assistive device;distance ambulated;gait pattern;gait deviations  -COLE      [place] Level (Gait) contact guard  -COLE      Assistive Device (Gait) walker, front-wheeled  -COLE      Distance in Feet (Gait) 136  -COLE      Pattern (Gait) swing-through  -COLE      Deviations/Abnormal Patterns (Gait) stride length decreased;gait speed decreased;antalgic   genu valgum L LE  -COLE      Recorded by [COLE] Ed Jackson, PTA 09/04/18 1242      Row Name 09/04/18 1011             Positioning and Restraints    Pre-Treatment Position in bed  -COLE      Post Treatment Position bed  -COLE      In Bed fowlers;call light within reach;encouraged to call for assist;exit alarm on;with family/caregiver  -COLE      Recorded by [COLE] Ed Jackson, PTA 09/04/18 1242      Row Name 09/04/18 1011             Pain Scale: Numbers Pre/Post-Treatment    Pain Scale: Numbers, Pretreatment 0/10 - no pain  -COLE      Pain Scale: Numbers, Post-Treatment 0/10 - no pain  -COLE       Recorded by [COLE] Ed Jackson, PTA 09/04/18 1242      Row Name 09/04/18 1011             Outcome Summary/Treatment Plan (PT)    Daily Summary of Progress (PT) progress toward functional goals as expected  -COLE      Anticipated Equipment Needs at Discharge (PT) bedside commode;gait belt;front wheeled walker  -COLE      Anticipated Discharge Disposition (PT) home with 24/7 care;home with home health  -COLE      Recorded by [COLE] Ed Jackson, PTA 09/04/18 1242        User Key  (r) = Recorded By, (t) = Taken By, (c) = Cosigned By    Initials Name Effective Dates Discipline    COLE Ed Jackson, PTA 03/07/18 -  PT                       PT Rehab Goals     Row Name 09/04/18 1011             Bed Mobility Goal 1 (PT)    Activity/Assistive Device (Bed Mobility Goal 1, PT) rolling to left;rolling to right;sit to supine;supine to sit  -COLE      [place] Level/Cues Needed (Bed Mobility Goal 1, PT) contact guard assist  -COLE      Time Frame (Bed Mobility Goal 1, PT) by discharge  -COLE      Progress/Outcomes (Bed Mobility Goal 1, PT) goal partially met;continuing progress toward goal;goal ongoing  -COLE         Transfer Goal 1 (PT)    Activity/Assistive Device (Transfer Goal 1, PT) sit-to-stand/stand-to-sit;bed-to-chair/chair-to-bed  -COLE      [place] Level/Cues Needed (Transfer Goal 1, PT) conditional independence  -COLE      Time Frame (Transfer Goal 1, PT) by discharge  -COLE      Progress/Outcome (Transfer Goal 1, PT) goal not met;continuing progress toward goal;goal ongoing  -COLE         Gait Training Goal 1 (PT)    Activity/Assistive Device (Gait Training Goal 1, PT) gait (walking locomotion);assistive device use;walker, rolling  -COLE      [place] Level (Gait Training Goal 1, PT) supervision required;conditional independence  -COLE      Distance (Gait Goal 1, PT) 150ft  -COLE      Time Frame (Gait Training Goal 1, PT) by discharge  -COLE      Progress/Outcome (Gait Training Goal 1, PT) goal not met  -COLE         Strength  Goal 1 (PT)    Strength Goal 1 (PT) Pt will tolerate 15 reps AROM exercises, OOB in chair  -COLE      Time Frame (Strength Goal 1, PT) by discharge  -      Progress/Outcome (Strength Goal 1, PT) goal partially met;continuing progress toward goal;goal ongoing  -        User Key  (r) = Recorded By, (t) = Taken By, (c) = Cosigned By    Initials Name Provider Type Discipline    COLE Ed Jackson, PTA Physical Therapy Assistant PT          Physical Therapy Education     Title: PT OT SLP Therapies (Resolved)     Topic: Physical Therapy (Resolved)     Point: Mobility training (Resolved)    Learning Progress Summary     Learner Status Readiness Method Response Comment Documented by    Patient Active Acceptance E NR benefits of ambulation outside of room.  08/31/18 1430     Active Acceptance E NR   08/30/18 1345          Point: Body mechanics (Resolved)    Learning Progress Summary     Learner Status Readiness Method Response Comment Documented by    Patient Active Acceptance E NR   08/30/18 1345          Point: Precautions (Resolved)    Learning Progress Summary     Learner Status Readiness Method Response Comment Documented by    Patient Active Acceptance E NR benefits of ambulation outside of room.  08/31/18 1430     Active Acceptance E NR   08/30/18 1345                      User Key     Initials Effective Dates Name Provider Type Discipline     04/03/18 -  Janice Madrid, PT Physical Therapist PT     07/23/18 -  Asuncion Pickering, PT Physical Therapist PT                    PT Recommendation and Plan  Anticipated Discharge Disposition (PT): home with 24/7 care, home with home health  Therapy Frequency (PT Clinical Impression): daily  Outcome Summary/Treatment Plan (PT)  Daily Summary of Progress (PT): progress toward functional goals as expected  Anticipated Equipment Needs at Discharge (PT): bedside commode, gait belt, front wheeled walker  Anticipated Discharge Disposition (PT): home with 24/7 care, home  with home health  Plan of Care Reviewed With: patient  Progress: improving  Outcome Summary: pt responded well to therapy. pt amb 136 ft CGA w/ RW and genu sotero L LE. pts BP low upon entry but improved throughout tx. no new goals met at this time. pt would continue to benefit from PT services.           Outcome Measures     Row Name 09/04/18 1011 09/03/18 1036 09/03/18 0915       How much help from another person do you currently need...    Turning from your back to your side while in flat bed without using bedrails? 4  -COLE 4  -EM  --    Moving from lying on back to sitting on the side of a flat bed without bedrails? 3  -COLE 3  -EM  --    Moving to and from a bed to a chair (including a wheelchair)? 3  -COLE 3  -EM  --    Standing up from a chair using your arms (e.g., wheelchair, bedside chair)? 3  -COLE 3  -EM  --    Climbing 3-5 steps with a railing? 3  -COLE 3  -EM  --    To walk in hospital room? 3  -COLE 3  -EM  --    AM-PAC 6 Clicks Score 19  -COLE 19  -EM  --       How much help from another is currently needed...    Putting on and taking off regular lower body clothing?  --  -- 3  -JH    Bathing (including washing, rinsing, and drying)  --  -- 3  -JH    Toileting (which includes using toilet bed pan or urinal)  --  -- 3  -JH    Putting on and taking off regular upper body clothing  --  -- 3  -JH    Taking care of personal grooming (such as brushing teeth)  --  -- 3  -JH    Eating meals  --  -- 4  -JH    Score  --  -- 19  -JH       Functional Assessment    Outcome Measure Options AM-PAC 6 Clicks Basic Mobility (PT)  -COLE AM-PAC 6 Clicks Basic Mobility (PT)  -EM  --    Row Name 09/02/18 1508 09/02/18 1100          How much help from another person do you currently need...    Turning from your back to your side while in flat bed without using bedrails? 4  -JW  --     Moving from lying on back to sitting on the side of a flat bed without bedrails? 3  -JW  --     Moving to and from a bed to a chair (including a  wheelchair)? 3  -JW  --     Standing up from a chair using your arms (e.g., wheelchair, bedside chair)? 3  -JW  --     Climbing 3-5 steps with a railing? 3  -JW  --     To walk in hospital room? 3  -JW  --     AM-PAC 6 Clicks Score 19  -JW  --        How much help from another is currently needed...    Putting on and taking off regular lower body clothing?  -- 3  -JH     Bathing (including washing, rinsing, and drying)  -- 3  -JH     Toileting (which includes using toilet bed pan or urinal)  -- 3  -JH     Putting on and taking off regular upper body clothing  -- 3  -JH     Taking care of personal grooming (such as brushing teeth)  -- 3  -JH     Eating meals  -- 4  -     Score  -- 19  -        Functional Assessment    Outcome Measure Options AM-PAC 6 Clicks Basic Mobility (PT)  -  --       User Key  (r) = Recorded By, (t) = Taken By, (c) = Cosigned By    Initials Name Provider Type    Boston Lopez, PTA Physical Therapy Assistant    Lorna Sun, PTA Physical Therapy Assistant    Ed Haq, PTA Physical Therapy Assistant    Narcisa Sequeira, ADAN/L Occupational Therapy Assistant           Time Calculation:         PT Charges     Row Name 09/04/18 1245             Time Calculation    Start Time 1011  -      Stop Time 1041  -      Time Calculation (min) 30 min  -         Time Calculation- PT    Total Timed Code Minutes- PT 30 minute(s)  -        User Key  (r) = Recorded By, (t) = Taken By, (c) = Cosigned By    Initials Name Provider Type    Ed Haq PTA Physical Therapy Assistant        Therapy Suggested Charges     Code   Minutes Charges    98923 (CPT®) Hc Pt Neuromusc Re Education Ea 15 Min      90476 (CPT®) Hc Pt Ther Proc Ea 15 Min 21 1    52995 (CPT®) Hc Gait Training Ea 15 Min      28299 (CPT®) Hc Pt Therapeutic Act Ea 15 Min      77436 (CPT®) Hc Pt Manual Therapy Ea 15 Min      80196 (CPT®) Hc Pt Iontophoresis Ea 15 Min      77220 (CPT®) Hc Pt Elec Stim  Ea-Per 15 Min      63905 (CPT®) Hc Pt Ultrasound Ea 15 Min      15812 (CPT®) Hc Pt Self Care/Mgmt/Train Ea 15 Min      44550 (CPT®) Hc Pt Prosthetic (S) Train Initial Encounter, Each 15 Min      11525 (CPT®) Hc Pt Orthotic(S)/Prosthetic(S) Encounter, Each 15 Min      76291 (CPT®) Hc Orthotic(S) Mgmt/Train Initial Encounter, Each 15min      Total  21 1        Therapy Charges for Today     Code Description Service Date Service Provider Modifiers Qty    55595950293 HC GAIT TRAINING EA 15 MIN 9/4/2018 Ed Jackson, PTA GP 1    37656330905 HC PT THERAPEUTIC ACT EA 15 MIN 9/4/2018 Ed Jackson, PTA GP 1          PT G-Codes  PT Professional Judgement Used?: Yes  Outcome Measure Options: AM-PAC 6 Clicks Basic Mobility (PT)  AM-PAC 6 Clicks Score: 19  Score: 19  Functional Limitation: Mobility: Walking and moving around  Mobility: Walking and Moving Around Current Status (): At least 20 percent but less than 40 percent impaired, limited or restricted  Mobility: Walking and Moving Around Goal Status (): At least 1 percent but less than 20 percent impaired, limited or restricted    Ed Jackson PTA  9/4/2018

## 2018-09-04 NOTE — DISCHARGE SUMMARY
[facility] Medicine Services  DISCHARGE SUMMARY       Date of Admission: 8/28/2018  Date of Discharge:  9/4/2018  Primary Care Physician: Abilio Bhardwaj MD    Presenting Problem/History of Present Illness:  Hypocalcemia [E83.51]  Weakness [R53.1]  Acute renal insufficiency [N28.9]  Acute renal insufficiency [N28.9]   Patient presents with 1-2 weeks of increased fatigue and decreased appetite.  Patient has been treated for multiple urinary tract infections over the course the past 2 weeks with multiple rounds of antibiotics.  Patient has fevers and chills  but there is no altered mental status.  There's been no coughing or upper respiratory type symptoms.  Patient feels that the fatigue and decreased appetite are secondary to her urinary tract infections.    Final Discharge Diagnoses:  Hospital Problem List     Acute renal insufficiency    Hypocalcemia          Consults:   Consults     Date and Time Order Name Status Description    8/28/2018 1743 Inpatient Nephrology Consult Completed           Procedures Performed: None                Pertinent Test Results:   Lab Results (last 7 days)     Procedure Component Value Units Date/Time    Blood Culture - Blood, [[id number]]  (Normal) Collected:  09/01/18 1011    Specimen:  Blood from Arm, Left Updated:  09/04/18 1101     Blood Culture No growth at 3 days    Blood Culture - Blood, [[id number]]  (Normal) Collected:  09/01/18 1000    Specimen:  Blood from Arm, Left Updated:  09/04/18 1101     Blood Culture No growth at 3 days    CBC & Differential [[id number]] Collected:  09/04/18 0548    Specimen:  Blood Updated:  09/04/18 0635    Narrative:       The following orders were created for panel order CBC & Differential.  Procedure                               Abnormality         Status                     ---------                               -----------         ------                     Scan Slide[[id number]]                                                                   CBC Auto Differential[[id number]]        Abnormal            Final result                 Please view results for these tests on the individual orders.    Basic Metabolic Panel [[id number]]  (Abnormal) Collected:  09/04/18 0548    Specimen:  Blood Updated:  09/04/18 0628     Glucose 98 mg/dL      BUN 17 mg/dL      Creatinine 0.89 mg/dL      Sodium 137 mmol/L      Potassium 4.4 mmol/L      Chloride 112 (H) mmol/L      CO2 16.0 (L) mmol/L      Calcium 8.9 mg/dL      eGFR Non African Amer 59 mL/min/1.73      BUN/Creatinine Ratio 19.1     Anion Gap 9.0 mmol/L     Narrative:       The MDRD GFR formula is only valid for adults with stable renal function between ages 18 and 70.    CBC Auto Differential [[id number]]  (Abnormal) Collected:  09/04/18 0548    Specimen:  Blood Updated:  09/04/18 0628     WBC 10.65 (H) 10*3/mm3      RBC 3.34 (L) 10*6/mm3      Hemoglobin 11.1 (L) g/dL      Hematocrit 32.4 (L) %      MCV 97.0 fL      MCH 33.2 pg      MCHC 34.3 g/dL      RDW 17.0 (H) %      RDW-SD 60.0 (H) fl      MPV 10.9 fL      Platelets 168 10*3/mm3      Neutrophil % 68.8 %      Lymphocyte % 16.2 %      Monocyte % 7.8 %      Eosinophil % 2.6 %      Basophil % 0.7 %      Immature Grans % 3.9 (H) %      Neutrophils, Absolute 7.32 10*3/mm3      Lymphocytes, Absolute 1.73 10*3/mm3      Monocytes, Absolute 0.83 10*3/mm3      Eosinophils, Absolute 0.28 10*3/mm3      Basophils, Absolute 0.07 10*3/mm3      Immature Grans, Absolute 0.42 (H) 10*3/mm3     Blood Culture - Blood, [[id number]]  (Normal) Collected:  08/29/18 1638    Specimen:  Blood from Hand, Right Updated:  09/03/18 1700     Blood Culture No growth at 5 days    Blood Culture - Blood, [[id number]]  (Normal) Collected:  08/29/18 1643    Specimen:  Blood from Arm, Left Updated:  09/03/18 1700     Blood Culture No growth at 5 days    Basic Metabolic Panel [[id number]]  (Abnormal) Collected:  09/03/18 0719    Specimen:  Blood Updated:   09/03/18 0743     Glucose 87 mg/dL      BUN 16 mg/dL      Creatinine 0.83 mg/dL      Sodium 137 mmol/L      Potassium 4.3 mmol/L      Chloride 113 (H) mmol/L      CO2 17.0 (L) mmol/L      Calcium 9.7 mg/dL      eGFR Non African Amer 64 mL/min/1.73      BUN/Creatinine Ratio 19.3     Anion Gap 7.0 mmol/L     Narrative:       The MDRD GFR formula is only valid for adults with stable renal function between ages 18 and 70.    CBC & Differential [[id number]] Collected:  09/03/18 0719    Specimen:  Blood Updated:  09/03/18 0729    Narrative:       The following orders were created for panel order CBC & Differential.  Procedure                               Abnormality         Status                     ---------                               -----------         ------                     CBC Auto Differential[[id number]]        Abnormal            Final result                 Please view results for these tests on the individual orders.    CBC Auto Differential [[id number]]  (Abnormal) Collected:  09/03/18 0719    Specimen:  Blood Updated:  09/03/18 0729     WBC 13.28 (H) 10*3/mm3      RBC 3.47 (L) 10*6/mm3      Hemoglobin 11.3 (L) g/dL      Hematocrit 32.9 (L) %      MCV 94.8 fL      MCH 32.6 pg      MCHC 34.3 g/dL      RDW 16.4 (H) %      RDW-SD 56.8 (H) fl      MPV 10.3 fL      Platelets 190 10*3/mm3      Neutrophil % 71.6 %      Lymphocyte % 13.8 %      Monocyte % 7.5 %      Eosinophil % 2.3 %      Basophil % 0.4 %      Immature Grans % 4.4 (H) %      Neutrophils, Absolute 9.51 (H) 10*3/mm3      Lymphocytes, Absolute 1.83 10*3/mm3      Monocytes, Absolute 1.00 (H) 10*3/mm3      Eosinophils, Absolute 0.30 10*3/mm3      Basophils, Absolute 0.05 10*3/mm3      Immature Grans, Absolute 0.59 (H) 10*3/mm3     Basic Metabolic Panel [[id number]]  (Abnormal) Collected:  09/02/18 0540    Specimen:  Blood Updated:  09/02/18 0653     Glucose 96 mg/dL      BUN 19 mg/dL      Creatinine 0.79 mg/dL      Sodium 137 mmol/L      Potassium 4.6  mmol/L      Chloride 114 (H) mmol/L      CO2 16.0 (L) mmol/L      Calcium 9.7 mg/dL      eGFR Non African Amer 68 mL/min/1.73      BUN/Creatinine Ratio 24.1     Anion Gap 7.0 mmol/L     Narrative:       The MDRD GFR formula is only valid for adults with stable renal function between ages 18 and 70.    CBC & Differential [[id number]] Collected:  09/02/18 0540    Specimen:  Blood Updated:  09/02/18 0633    Narrative:       The following orders were created for panel order CBC & Differential.  Procedure                               Abnormality         Status                     ---------                               -----------         ------                     Scan Slide[[id number]]                                                                  CBC Auto Differential[[id number]]        Abnormal            Final result                 Please view results for these tests on the individual orders.    CBC Auto Differential [[id number]]  (Abnormal) Collected:  09/02/18 0540    Specimen:  Blood Updated:  09/02/18 0633     WBC 12.73 (H) 10*3/mm3      RBC 3.31 (L) 10*6/mm3      Hemoglobin 10.9 (L) g/dL      Hematocrit 31.6 (L) %      MCV 95.5 fL      MCH 32.9 pg      MCHC 34.5 g/dL      RDW 16.6 (H) %      RDW-SD 57.3 (H) fl      MPV 10.8 fL      Platelets 259 10*3/mm3      Neutrophil % 73.6 %      Lymphocyte % 14.2 %      Monocyte % 6.7 %      Eosinophil % 2.4 %      Basophil % 0.4 %      Immature Grans % 2.7 (H) %      Neutrophils, Absolute 9.37 (H) 10*3/mm3      Lymphocytes, Absolute 1.81 10*3/mm3      Monocytes, Absolute 0.85 10*3/mm3      Eosinophils, Absolute 0.31 10*3/mm3      Basophils, Absolute 0.05 10*3/mm3      Immature Grans, Absolute 0.34 (H) 10*3/mm3     KAROLYN + PE [[id number]] Collected:  09/02/18 0540    Specimen:  Blood Updated:  09/02/18 0610    Urinalysis With Culture If Indicated - Urine, Clean Catch [[id number]]  (Abnormal) Collected:  09/01/18 1349    Specimen:  Urine from Urine, Clean Catch Updated:   09/01/18 1657     Color, UA Yellow     Appearance, UA Cloudy (A)     pH, UA <=5.0     Specific Gravity, UA 1.020     Glucose, UA Negative     Ketones, UA Negative     Bilirubin, UA Negative     Blood, UA Negative     Protein, UA Negative     Leuk Esterase, UA Negative     Nitrite, UA Negative     Urobilinogen, UA 0.2 E.U./dL    Narrative:       Urine microscopic not indicated.    Respiratory Panel, PCR - Swab, Nasopharynx [[id number]]  (Normal) Collected:  09/01/18 0936    Specimen:  Swab from Nasopharynx Updated:  09/01/18 1225     ADENOVIRUS, PCR Not Detected     Coronavirus 229E Not Detected     Coronavirus HKU1 Not Detected     Coronavirus NL63 Not Detected     Coronavirus OC43 Not Detected     Human Metapneumovirus Not Detected     Human Rhinovirus/Enterovirus Not Detected     Influenza B PCR Not Detected     Parainfluenza Virus 1 Not Detected     Parainfluenza Virus 2 Not Detected     Parainfluenza Virus 3 Not Detected     Parainfluenza Virus 4 Not Detected     Bordetella pertussis pcr Not Detected     Influenza A H1 2009 PCR Not Detected     Chlamydophila pneumoniae PCR Not Detected     Mycoplasma pneumo by PCR Not Detected     Influenza A PCR Not Detected     Influenza A H3 Not Detected     Influenza A H1 Not Detected     RSV, PCR Not Detected    Extra Tubes [[id number]] Collected:  09/01/18 1050    Specimen:  Blood from Blood, Venous Line Updated:  09/01/18 1200    Narrative:       The following orders were created for panel order Extra Tubes.  Procedure                               Abnormality         Status                     ---------                               -----------         ------                     Light Blue Top[[id number]]                                   Final result               Lavender Top[[id number]]                                     Final result               Gold Top - SST[[id number]]                                   Final result               Green Top (Gel)[[id number]]                                   Final result                 Please view results for these tests on the individual orders.    Light Blue Top [[id number]] Collected:  09/01/18 1050    Specimen:  Blood Updated:  09/01/18 1200     Extra Tube hold for add-on     Comment: Auto resulted       Lavender Top [[id number]] Collected:  09/01/18 1050    Specimen:  Blood Updated:  09/01/18 1200     Extra Tube hold for add-on     Comment: Auto resulted       Gold Top - SST [[id number]] Collected:  09/01/18 1050    Specimen:  Blood Updated:  09/01/18 1200     Extra Tube Hold for add-ons.     Comment: Auto resulted.       Green Top (Gel) [[id number]] Collected:  09/01/18 1050    Specimen:  Blood Updated:  09/01/18 1200     Extra Tube Hold for add-ons.     Comment: Auto resulted.       CBC & Differential [[id number]] Collected:  09/01/18 0628    Specimen:  Blood Updated:  09/01/18 1001    Narrative:       The following orders were created for panel order CBC & Differential.  Procedure                               Abnormality         Status                     ---------                               -----------         ------                     Manual Differential[[id number]]          Abnormal            Final result               Scan Slide[[id number]]                                                                  CBC Auto Differential[[id number]]        Abnormal            Final result                 Please view results for these tests on the individual orders.    Manual Differential [[id number]]  (Abnormal) Collected:  09/01/18 0628    Specimen:  Blood Updated:  09/01/18 1001     Neutrophil % 75.0 %      Lymphocyte % 17.0 %      Monocyte % 6.0 %      Eosinophil % 2.0 %      Neutrophils Absolute 11.09 (H) 10*3/mm3      Lymphocytes Absolute 2.51 10*3/mm3      Monocytes Absolute 0.89 10*3/mm3      Eosinophils Absolute 0.30 10*3/mm3      Smudge Cells 2.0 /100 WBC      Ovalocytes Mod/2+     WBC Morphology Normal     Platelet Morphology Normal    Basic  Metabolic Panel [[id number]]  (Abnormal) Collected:  09/01/18 0628    Specimen:  Blood Updated:  09/01/18 0740     Glucose 91 mg/dL      BUN 18 mg/dL      Creatinine 0.86 mg/dL      Sodium 139 mmol/L      Potassium 4.1 mmol/L      Chloride 116 (H) mmol/L      CO2 17.0 (L) mmol/L      Calcium 8.9 mg/dL      eGFR Non African Amer 62 mL/min/1.73      BUN/Creatinine Ratio 20.9     Anion Gap 6.0 mmol/L     Narrative:       The MDRD GFR formula is only valid for adults with stable renal function between ages 18 and 70.    CBC Auto Differential [[id number]]  (Abnormal) Collected:  09/01/18 0628    Specimen:  Blood Updated:  09/01/18 0723     WBC 14.78 (H) 10*3/mm3      RBC 3.76 (L) 10*6/mm3      Hemoglobin 12.3 g/dL      Hematocrit 36.2 %      MCV 96.3 fL      MCH 32.7 pg      MCHC 34.0 g/dL      RDW 16.7 (H) %      RDW-SD 57.7 (H) fl      MPV 10.4 fL      Platelets 233 10*3/mm3     Extra Tubes [[id number]] Collected:  08/31/18 1046    Specimen:  Blood from Blood, Venous Line Updated:  08/31/18 1200    Narrative:       The following orders were created for panel order Extra Tubes.  Procedure                               Abnormality         Status                     ---------                               -----------         ------                     Gold Top - [facility][[id number]]                                   Final result                 Please view results for these tests on the individual orders.    Gold Top - SST [[id number]] Collected:  08/31/18 1046    Specimen:  Blood Updated:  08/31/18 1200     Extra Tube Hold for add-ons.     Comment: Auto resulted.       CBC & Differential [[id number]] Collected:  08/31/18 1037    Specimen:  Blood Updated:  08/31/18 1051    Narrative:       The following orders were created for panel order CBC & Differential.  Procedure                               Abnormality         Status                     ---------                               -----------         ------                      CBC Auto Differential[[id number]]        Abnormal            Final result                 Please view results for these tests on the individual orders.    CBC Auto Differential [[id number]]  (Abnormal) Collected:  08/31/18 1037    Specimen:  Blood Updated:  08/31/18 1051     WBC 11.65 (H) 10*3/mm3      RBC 3.63 (L) 10*6/mm3      Hemoglobin 11.8 (L) g/dL      Hematocrit 34.5 (L) %      MCV 95.0 fL      MCH 32.5 pg      MCHC 34.2 g/dL      RDW 16.5 (H) %      RDW-SD 57.4 (H) fl      MPV 9.9 fL      Platelets 217 10*3/mm3      Neutrophil % 70.5 %      Lymphocyte % 13.6 %      Monocyte % 10.0 %      Eosinophil % 2.4 %      Basophil % 0.3 %      Immature Grans % 3.2 (H) %      Neutrophils, Absolute 8.21 10*3/mm3      Lymphocytes, Absolute 1.59 10*3/mm3      Monocytes, Absolute 1.17 (H) 10*3/mm3      Eosinophils, Absolute 0.28 10*3/mm3      Basophils, Absolute 0.03 10*3/mm3      Immature Grans, Absolute 0.37 (H) 10*3/mm3     Magnesium [[id number]]  (Normal) Collected:  08/31/18 0851    Specimen:  Blood Updated:  08/31/18 1022     Magnesium 2.0 mg/dL     Basic Metabolic Panel [[id number]]  (Abnormal) Collected:  08/31/18 0851    Specimen:  Blood Updated:  08/31/18 0934     Glucose 101 (H) mg/dL      BUN 21 mg/dL      Creatinine 0.88 mg/dL      Sodium 142 mmol/L      Potassium 4.2 mmol/L      Chloride 118 (H) mmol/L      CO2 15.0 (L) mmol/L      Calcium 8.2 (L) mg/dL      eGFR Non African Amer 60 mL/min/1.73      BUN/Creatinine Ratio 23.9     Anion Gap 9.0 mmol/L     Narrative:       The MDRD GFR formula is only valid for adults with stable renal function between ages 18 and 70.    Protein Elec + Interp, Serum [[id number]]  (Abnormal) Collected:  08/29/18 0622    Specimen:  Blood Updated:  08/30/18 1417     Total Protein 5.5 (L) g/dL      Albumin 2.5 (L) g/dL      Alpha-1-Globulin 0.3 g/dL      Alpha-2-Globulin 1.2 (H) g/dL      Beta Globulin 0.7 g/dL      Gamma Globulin 0.9 g/dL      M-Trever Comment: g/dL      Comment: SPE  SHOWS ASYMMETRICAL GAMMA.        Globulin 3.0 g/dL      A/G Ratio 0.8     Please note Comment     Comment: Protein electrophoresis scan will follow via computer, mail, or   delivery.        SPE Interpretation Comment     Comment: Serum Protein Electrophoresis shows an asymmetrical gamma region  which may represent the presence of a paraprotein. Recommend serum  immunofixation electrophoresis to rule out a plasma cell dyscrasia,  if clinically indicated.       Narrative:       Performed at:  [address] [place] [address]  [id number]  : Eren Cluadio PhD, Phone:  [phone]    Basic Metabolic Panel [[id number]]  (Abnormal) Collected:  08/30/18 0524    Specimen:  Blood Updated:  08/30/18 0606     Glucose 93 mg/dL      BUN 34 (H) mg/dL      Creatinine 1.03 (H) mg/dL      Sodium 141 mmol/L      Potassium 3.7 mmol/L      Chloride 119 (H) mmol/L      CO2 13.0 (L) mmol/L      Calcium 7.0 (L) mg/dL      eGFR Non African Amer 50 mL/min/1.73      BUN/Creatinine Ratio 33.0 (H)     Anion Gap 9.0 mmol/L     Narrative:       The MDRD GFR formula is only valid for adults with stable renal function between ages 18 and 70.    Calcium, Ionized [[id number]]  (Abnormal) Collected:  08/30/18 0524    Specimen:  Blood Updated:  08/30/18 0547     Ionized Calcium 3.61 (L) mg/dL     CBC & Differential [[id number]] Collected:  08/30/18 0524    Specimen:  Blood Updated:  08/30/18 0540    Narrative:       The following orders were created for panel order CBC & Differential.  Procedure                               Abnormality         Status                     ---------                               -----------         ------                     CBC Auto Differential[[id number]]        Abnormal            Final result                 Please view results for these tests on the individual orders.    CBC Auto Differential [[id number]]  (Abnormal) Collected:  08/30/18 0524    Specimen:  Blood Updated:  08/30/18  0540     WBC 8.73 10*3/mm3      RBC 3.51 (L) 10*6/mm3      Hemoglobin 11.6 (L) g/dL      Hematocrit 34.5 (L) %      MCV 98.3 (H) fL      MCH 33.0 pg      MCHC 33.6 g/dL      RDW 16.9 (H) %      RDW-SD 61.1 (H) fl      MPV 9.4 fL      Platelets 238 10*3/mm3      Neutrophil % 68.2 %      Lymphocyte % 18.1 %      Monocyte % 8.5 %      Eosinophil % 2.5 %      Basophil % 0.3 %      Immature Grans % 2.4 (H) %      Neutrophils, Absolute 5.95 10*3/mm3      Lymphocytes, Absolute 1.58 10*3/mm3      Monocytes, Absolute 0.74 10*3/mm3      Eosinophils, Absolute 0.22 10*3/mm3      Basophils, Absolute 0.03 10*3/mm3      Immature Grans, Absolute 0.21 (H) 10*3/mm3      nRBC 0.0 /100 WBC     Extra Tubes [[id number]] Collected:  08/29/18 1643    Specimen:  Blood from Blood, Venous Line Updated:  08/29/18 1745    Narrative:       The following orders were created for panel order Extra Tubes.  Procedure                               Abnormality         Status                     ---------                               -----------         ------                     Green Top (Gel)[[id number]]                                  Final result                 Please view results for these tests on the individual orders.    Green Top (Gel) [[id number]] Collected:  08/29/18 1643    Specimen:  Blood Updated:  08/29/18 1745     Extra Tube Hold for add-ons.     Comment: Auto resulted.       Calcium, Ionized [[id number]]  (Abnormal) Collected:  08/29/18 1424    Specimen:  Blood Updated:  08/29/18 1500     Ionized Calcium 3.58 (L) mg/dL     PTH, Intact & Calcium [[id number]]  (Abnormal) Collected:  08/29/18 0622    Specimen:  Blood Updated:  08/29/18 0716     PTH, Intact 228.0 (H) pg/mL      Calcium 5.7 (C) mg/dL     Vitamin D 25 Hydroxy [[id number]]  (Abnormal) Collected:  08/29/18 0622    Specimen:  Blood Updated:  08/29/18 0707     25 Hydroxy, Vitamin D <12.8 (L) ng/ml     Basic Metabolic Panel [[id number]]  (Abnormal) Collected:  08/29/18 0622     Specimen:  Blood Updated:  08/29/18 0659     Glucose 85 mg/dL      BUN 43 (H) mg/dL      Creatinine 1.28 (H) mg/dL      Sodium 142 mmol/L      Potassium 5.0 mmol/L      Chloride 114 (H) mmol/L      CO2 19.0 (L) mmol/L      Calcium 5.7 (C) mg/dL      eGFR Non African Amer 39 mL/min/1.73      BUN/Creatinine Ratio 33.6 (H)     Anion Gap 9.0 mmol/L     Narrative:       The MDRD GFR formula is only valid for adults with stable renal function between ages 18 and 70.    Phosphorus [[id number]]  (Normal) Collected:  08/29/18 0622    Specimen:  Blood Updated:  08/29/18 0653     Phosphorus 3.9 mg/dL     Osmolality, Urine - Urine, Clean Catch [[id number]]  (Normal) Collected:  08/29/18 0615    Specimen:  Urine from Urine, Clean Catch Updated:  08/29/18 0638     Osmolality, Urine 395 mOsm/kg     CBC & Differential [[id number]] Collected:  08/29/18 0622    Specimen:  Blood Updated:  08/29/18 0637    Narrative:       The following orders were created for panel order CBC & Differential.  Procedure                               Abnormality         Status                     ---------                               -----------         ------                     CBC Auto Differential[[id number]]        Abnormal            Final result                 Please view results for these tests on the individual orders.    CBC Auto Differential [[id number]]  (Abnormal) Collected:  08/29/18 0622    Specimen:  Blood Updated:  08/29/18 0637     WBC 8.94 10*3/mm3      RBC 3.63 (L) 10*6/mm3      Hemoglobin 11.7 (L) g/dL      Hematocrit 35.2 %      MCV 97.0 fL      MCH 32.2 pg      MCHC 33.2 g/dL      RDW 16.5 (H) %      RDW-SD 58.2 (H) fl      MPV 9.6 fL      Platelets 238 10*3/mm3      Neutrophil % 67.9 %      Lymphocyte % 17.7 %      Monocyte % 8.8 %      Eosinophil % 2.1 %      Basophil % 0.3 %      Immature Grans % 3.2 (H) %      Neutrophils, Absolute 6.06 10*3/mm3      Lymphocytes, Absolute 1.58 10*3/mm3      Monocytes, Absolute 0.79 10*3/mm3       Eosinophils, Absolute 0.19 10*3/mm3      Basophils, Absolute 0.03 10*3/mm3      Immature Grans, Absolute 0.29 (H) 10*3/mm3     Calcium, Ionized [[id number]]  (Abnormal) Collected:  08/29/18 0622    Specimen:  Blood Updated:  08/29/18 0635     Ionized Calcium 3.19 (L) mg/dL     Sodium, Urine, Random - Urine, Clean Catch [[id number]]  (Normal) Collected:  08/29/18 0615    Specimen:  Urine from Urine, Clean Catch Updated:  08/29/18 0628     Sodium, Urine 59 mmol/L     TSH [[id number]]  (Abnormal) Collected:  08/28/18 1418    Specimen:  Blood Updated:  08/28/18 2003     TSH 0.030 (L) mIU/mL     Troponin [[id number]]  (Normal) Collected:  08/28/18 1617    Specimen:  Blood from Arm, Left Updated:  08/28/18 1654     Troponin I 0.021 ng/mL     Calcium, Ionized [[id number]]  (Abnormal) Collected:  08/28/18 1617    Specimen:  Blood from Arm, Left Updated:  08/28/18 1634     Ionized Calcium 2.85 (L) mg/dL     Lipase [[id number]]  (Normal) Collected:  08/28/18 1418    Specimen:  Blood Updated:  08/28/18 1606     Lipase 37 U/L     Urinalysis With Microscopic If Indicated (No Culture) - Urine, Clean Catch [[id number]]  (Normal) Collected:  08/28/18 1525    Specimen:  Urine from Urine, Clean Catch Updated:  08/28/18 1602     Color, UA Yellow     Appearance, UA Clear     pH, UA 5.5     Specific Gravity, UA 1.010     Glucose, UA Negative     Ketones, UA Negative     Bilirubin, UA Negative     Blood, UA Negative     Protein, UA Negative     Leuk Esterase, UA Negative     Nitrite, UA Negative     Urobilinogen, UA 0.2 E.U./dL    Narrative:       Urine microscopic not indicated.    [place] Draw [[id number]] Collected:  08/28/18 1418    Specimen:  Blood Updated:  08/28/18 1530    Narrative:       The following orders were created for panel order [place] Draw.  Procedure                               Abnormality         Status                     ---------                               -----------         ------                     Light Blue  Top[[id number]]                                   Final result               Green Top (Gel)[[id number]]                                  Final result               Lavender Top[[id number]]                                     Final result               Gold Top - SST[[id number]]                                   Final result                 Please view results for these tests on the individual orders.    Light Blue Top [[id number]] Collected:  08/28/18 1418    Specimen:  Blood Updated:  08/28/18 1530     Extra Tube hold for add-on     Comment: Auto resulted       Green Top (Gel) [[id number]] Collected:  08/28/18 1418    Specimen:  Blood Updated:  08/28/18 1530     Extra Tube Hold for add-ons.     Comment: Auto resulted.       Lavender Top [[id number]] Collected:  08/28/18 1418    Specimen:  Blood Updated:  08/28/18 1530     Extra Tube hold for add-on     Comment: Auto resulted       Gold Top - SST [[id number]] Collected:  08/28/18 1418    Specimen:  Blood Updated:  08/28/18 1530     Extra Tube Hold for add-ons.     Comment: Auto resulted.       Troponin [[id number]]  (Normal) Collected:  08/28/18 1418    Specimen:  Blood Updated:  08/28/18 1454     Troponin I 0.020 ng/mL     BNP [[id number]]  (Abnormal) Collected:  08/28/18 1418    Specimen:  Blood Updated:  08/28/18 1454     proBNP 3,210.0 (H) pg/mL     Protime-INR [[id number]]  (Abnormal) Collected:  08/28/18 1418    Specimen:  Blood Updated:  08/28/18 1453     Protime 15.7 (H) Seconds      INR 1.29 (H)    Narrative:       Therapeutic range for most indications is 2.0-3.0 INR,  or 2.5-3.5 for mechanical heart valves.    Comprehensive Metabolic Panel [[id number]]  (Abnormal) Collected:  08/28/18 1418    Specimen:  Blood Updated:  08/28/18 1447     Glucose 102 (H) mg/dL      BUN 48 (H) mg/dL      Creatinine 1.60 (H) mg/dL      Sodium 139 mmol/L      Potassium 4.2 mmol/L      Chloride 109 mmol/L      CO2 17.0 (L) mmol/L      Calcium 5.6 (C) mg/dL      Total Protein 7.0 g/dL       Albumin 3.40 g/dL      ALT (SGPT) 20 U/L      AST (SGOT) 21 U/L      Alkaline Phosphatase 73 U/L      Total Bilirubin 0.5 mg/dL      eGFR Non African Amer 30 (L) mL/min/1.73      Globulin 3.6 (H) gm/dL      A/G Ratio 0.9 (L) g/dL      BUN/Creatinine Ratio 30.0 (H)     Anion Gap 13.0 mmol/L     Narrative:       The MDRD GFR formula is only valid for adults with stable renal function between ages 18 and 70.    Magnesium [[id number]]  (Normal) Collected:  08/28/18 1418    Specimen:  Blood Updated:  08/28/18 1441     Magnesium 1.9 mg/dL     CBC & Differential [[id number]] Collected:  08/28/18 1418    Specimen:  Blood Updated:  08/28/18 1429    Narrative:       The following orders were created for panel order CBC & Differential.  Procedure                               Abnormality         Status                     ---------                               -----------         ------                     CBC Auto Differential[[id number]]        Abnormal            Final result                 Please view results for these tests on the individual orders.    CBC Auto Differential [[id number]]  (Abnormal) Collected:  08/28/18 1418    Specimen:  Blood Updated:  08/28/18 1429     WBC 13.03 (H) 10*3/mm3      RBC 3.91 10*6/mm3      Hemoglobin 12.6 g/dL      Hematocrit 37.8 %      MCV 96.7 fL      MCH 32.2 pg      MCHC 33.3 g/dL      RDW 16.6 (H) %      RDW-SD 58.4 (H) fl      MPV 9.7 fL      Platelets 255 10*3/mm3      Neutrophil % 75.6 %      Lymphocyte % 12.4 %      Monocyte % 8.3 %      Eosinophil % 1.2 %      Basophil % 0.3 %      Immature Grans % 2.2 (H) %      Neutrophils, Absolute 9.85 (H) 10*3/mm3      Lymphocytes, Absolute 1.61 10*3/mm3      Monocytes, Absolute 1.08 (H) 10*3/mm3      Eosinophils, Absolute 0.16 10*3/mm3      Basophils, Absolute 0.04 10*3/mm3      Immature Grans, Absolute 0.29 (H) 10*3/mm3         Imaging Results (last 7 days)     Procedure Component Value Units Date/Time    XR Chest PA & Lateral [[id number]]  Collected:  09/01/18 0852     Updated:  09/01/18 0926    Narrative:       Chest PA and lateral         CLINICAL INDICATION: Shortness of breath    COMPARISON: August 28, 2018    FINDINGS: Cardiomegaly. Midline sternal sutures from prior  cardiac surgery. Radiopacities lower thoracic and upper lumbar  spine from kyphoplasty, vertebroplasty.    Scattered fibrotic changes in both lung fields.    Lungs otherwise clear. No dense consolidation is observed.  Allowing for differences in technique and phase of respiration no  significant changes since prior exam.      Impression:       CONCLUSION: No evidence of active disease.    Electronically signed by:  Ady Núñez MD  9/1/2018 9:25 AM CDT  Workstation: [id number]    US Renal Bilateral [[id number]] Collected:  08/29/18 0730     Updated:  08/29/18 1730    Narrative:         PROCEDURE: US RENAL BILATERAL    Indication:  Acute renal failure     Technique: Transabdominal    Prior Relevant Exam: None      Limitations: None      Right Kidney: 8.8 x 4.1 x 4.9 cm. .     Contour: Normal    Cortical thickness: Mild thinning.     Echogenicity: Normal    Hydronephrosis or hydroureter: None    Left Kidney: 10.6 x 4.3 x 4.7 cm. 1.2 cm simple appearing cyst.     Contour: Normal    Cortical thickness: Mild thinning.     Echogenicity: Normal    Hydronephrosis or hydroureter: None    Urinary Bladder: Normal    Misc: Simple appearing 2 cm hepatic cyst inferior right lobe           Impression:       CONCLUSION:     1. Simple cyst left kidney.  2. Mild thinning of the renal cortex bilaterally. Likely related  to patient's age.  3. Simple appearing hepatic cyst inferior right lobe.     Electronically signed by:  Mak Mora MD  8/29/2018 5:28 PM CDT  Workstation: [id number]    XR Chest 1 View [[id number]] Collected:  08/28/18 1330     Updated:  08/28/18 1347    Narrative:         EXAM:         Radiograph(s), Chest   VIEWS:   Frontal  ; 1       DATE/TIME:  8/28/2018 1:45 PM CDT               "  INDICATION:   Chest pain protocol  chest pain protocol    COMPARISON:  CXR: 7/14/18             FINDINGS:             - lines/tubes:    none     - cardiac:         size within normal limits         - mediastinum: contour within normal limits         - lungs:         no focal air space process, pulmonary  interstitial edema, nodule(s)/mass             - pleura:         no evidence of  fluid                  - osseous:         Status post median sternotomy for CABG.  Healing/healed distal right clavicle fracture.                  - misc.:         Impression:       CONCLUSION:        1. No evidence of an active cardiopulmonary process.                                                              Electronically signed by:  ADRIANA Pittman MD  8/28/2018 1:46  PM CDT Workstation: [id number]            Chief Complaint on Day of Discharge: None    Hospital Course:  The patient was admitted for acute renal insufficiency and hypocalcemia.  She was commenced on IV hydration, calcium supplements and vitamin D.  Both creatinine and calcium were routinely monitored and did normalize prior to discharge.  She was seen by nephrologist on consult and appropriate recommendations were made.  - Patient was also continued on her routine outpatient medications for hypothyroidism, hypertension and glaucoma.  She also benefited from PT and OT secondary to deconditioning.      Condition on Discharge:  Stable and improved    Physical Exam on Discharge:  /86   Pulse 63   Temp 98 °F (36.7 °C) (Oral)   Resp 20   Ht 166.4 cm (65.5\")   Wt 46.9 kg (103 lb 4.8 oz)   SpO2 93%   BMI 16.93 kg/m²   Physical Exam  Constitutional: She is oriented to person, place, and time. She appears well-developed and well-nourished. She is cooperative. No distress.   HENT:   Head: Normocephalic and atraumatic.   Right Ear: External ear normal.   Left Ear: External ear normal.   Nose: Nose normal.   Mouth/Throat: Oropharynx is clear and moist.   Eyes: " Pupils are equal, round, and reactive to light. Conjunctivae and EOM are normal.   Neck: Normal range of motion. Neck supple. No JVD present. No thyromegaly present.   Cardiovascular: Normal rate, regular rhythm, normal heart sounds and intact distal pulses.  Exam reveals no gallop and no friction rub.    No murmur heard.  Pulmonary/Chest: Effort normal and breath sounds normal. No stridor. No respiratory distress. She has no wheezes. She has no rales. She exhibits no tenderness.   Abdominal: Soft. Bowel sounds are normal. She exhibits no distension and no mass. There is no tenderness. There is no rebound and no guarding. No hernia.   Musculoskeletal: Normal range of motion. She exhibits no edema, tenderness or deformity.   Neurological: She is alert and oriented to person, place, and time. She has normal reflexes. No sensory deficit. She exhibits normal muscle tone.   Skin: Skin is warm and dry. No rash noted. She is not diaphoretic. No erythema. No pallor.   She has mild chronic venous stasis changes both legs.   Psychiatric: She has a normal mood and affect. Her behavior is normal. Judgment and thought content normal.   Nursing note and vitals reviewed.    Discharge Disposition:  Skilled Nursing Facility (DC - External)    Discharge Medications:     Discharge Medications      New Medications      Instructions Start Date   calcium carbonate 500 MG chewable tablet  Commonly known as:  TUMS   1 tablet, Oral, 2 Times Daily      Cholecalciferol 5000 units tablet   5,000 Units, Oral, Daily         Changes to Medications      Instructions Start Date   aspirin 81 MG EC tablet  What changed:  when to take this   81 mg, Oral, Daily      furosemide 20 MG tablet  Commonly known as:  LASIX  What changed:  See the new instructions.   20 mg, Oral, Daily      lisinopril 10 MG tablet  Commonly known as:  PRINIVIL,ZESTRIL  What changed:  See the new instructions.   TAKE 1 TABLET DAILY FOR BLOOD PRESSURE.      rOPINIRole 0.5 MG  tablet  Commonly known as:  REQUIP  What changed:  See the new instructions.   TAKE 1 TABLET  EVERY EVENING         Continue These Medications      Instructions Start Date   ALPHAGAN P 0.1 % solution ophthalmic solution  Generic drug:  brimonidine   INSTILL 1 DROP IN BOTH EYES THREE TIMES DAILY      carvedilol 6.25 MG tablet  Commonly known as:  COREG   TAKE 1 TABLET  TWICE DAILY WITH MEALS      docusate sodium 100 MG capsule  Commonly known as:  DOK   100 mg, Oral, 2 Times Daily      HYDROcodone-acetaminophen 5-325 MG per tablet  Commonly known as:  NORCO   0.5 tablets, Oral, Every 8 Hours PRN      latanoprost 0.005 % ophthalmic solution  Commonly known as:  XALATAN   1 drop, Both Eyes, Nightly      levothyroxine 112 MCG tablet  Commonly known as:  SYNTHROID, LEVOTHROID   TAKE 1 TAB DAILY FOR THYROID, ON EMPTY STOMACH -- EITHER 1 HOUR BEFORE EATING OR 2 HOURS AFTER      megestrol 40 MG/ML suspension  Commonly known as:  MEGACE ORAL   400 mg, Oral, 2 Times Daily      mirtazapine 15 MG tablet  Commonly known as:  REMERON   15 mg, Oral, Nightly, Stop zoloft             Discharge Diet:  heart healthy     Activity at Discharge:  as tolerated.    Discharge Care Plan/Instructions: Patient has been advised to take her medications as prescribed and to return to the emergency department in the event of any worsening symptoms.    Follow-up Appointments:   Future Appointments  Date Time Provider Department Center   9/17/2018 1:30 PM Abilio Bhardwaj MD [facility]4 None   12/20/2018 1:30 PM Jose Chao MD [facility] CD [facility]AD None   2/15/2019 10:00 AM  [facility] PROCEDURE CHAIR  [facility]       Test Results Pending at Discharge:  Order Current Status    KAROLYN + PE In process    Blood Culture - Blood, Preliminary result    Blood Culture - Blood, Preliminary result          Jb Stokes MD  09/04/18  11:42 AM    Time: 35 minutes

## 2018-09-04 NOTE — THERAPY DISCHARGE NOTE
Acute Care - Occupational Therapy Discharge Summary  [facility]     Patient Name: Tiffanie Arroyo  : 9/10/1926  MRN: [id number]    Today's Date: 2018  Onset of Illness/Injury or Date of Surgery: 18    Date of Referral to OT: 18  Referring Physician: Dr. Derrell Reyes      Admit Date: 2018        OT Recommendation and Plan    Visit Dx:    ICD-10-CM ICD-9-CM   1. Acute renal insufficiency N28.9 593.9   2. Hypocalcemia E83.51 275.41   3. Weakness R53.1 780.79   4. Impaired functional mobility, balance, gait, and endurance Z74.09 V49.89   5. Impaired mobility and activities of daily living Z74.09 799.89   6. Primary osteoarthritis involving multiple joints M15.0 715.09   7. Heart valve disorder I38 424.90   8. Repeated falls R29.6 781.99   9. Hypothyroidism due to Hashimoto's thyroiditis E03.8 244.8    E06.3 245.2                         Outcome Measures     Row Name 18 1011 18 1036 18 0915       How much help from another person do you currently need...    Turning from your back to your side while in flat bed without using bedrails? 4  -COLE 4  -EM  --    Moving from lying on back to sitting on the side of a flat bed without bedrails? 3  -COLE 3  -EM  --    Moving to and from a bed to a chair (including a wheelchair)? 3  -COLE 3  -EM  --    Standing up from a chair using your arms (e.g., wheelchair, bedside chair)? 3  -COLE 3  -EM  --    Climbing 3-5 steps with a railing? 3  -COLE 3  -EM  --    To walk in hospital room? 3  -COLE 3  -EM  --    AM-PAC 6 Clicks Score 19  -COLE 19  -EM  --       How much help from another is currently needed...    Putting on and taking off regular lower body clothing?  --  -- 3  -JH    Bathing (including washing, rinsing, and drying)  --  -- 3  -JH    Toileting (which includes using toilet bed pan or urinal)  --  -- 3  -JH    Putting on and taking off regular upper body clothing  --  -- 3  -JH    Taking care of personal grooming (such as brushing teeth)  --   -- 3  -JH    Eating meals  --  -- 4  -    Score  --  -- 19  -       Functional Assessment    Outcome Measure Options AM-PAC 6 Clicks Basic Mobility (PT)  -[facility] 6 Clicks Basic Mobility (PT)  -  --    Row Name 09/02/18 1508 09/02/18 1100          How much help from another person do you currently need...    Turning from your back to your side while in flat bed without using bedrails? 4  -JW  --     Moving from lying on back to sitting on the side of a flat bed without bedrails? 3  -JW  --     Moving to and from a bed to a chair (including a wheelchair)? 3  -JW  --     Standing up from a chair using your arms (e.g., wheelchair, bedside chair)? 3  -JW  --     Climbing 3-5 steps with a railing? 3  -JW  --     To walk in hospital room? 3  -JW  --     AM-PAC 6 Clicks Score 19  -JW  --        How much help from another is currently needed...    Putting on and taking off regular lower body clothing?  -- 3  -JH     Bathing (including washing, rinsing, and drying)  -- 3  -JH     Toileting (which includes using toilet bed pan or urinal)  -- 3  -JH     Putting on and taking off regular upper body clothing  -- 3  -JH     Taking care of personal grooming (such as brushing teeth)  -- 3  -JH     Eating meals  -- 4  -     Score  -- 19  -        Functional Assessment    Outcome Measure Options AM-PAC 6 Clicks Basic Mobility (PT)  -  --       User Key  (r) = Recorded By, (t) = Taken By, (c) = Cosigned By    Initials Name Provider Type    EM Boston Fried, PTA Physical Therapy Assistant    JW Lorna Cox, PTA Physical Therapy Assistant    Ed Haq, PTA Physical Therapy Assistant    JH Narcisa Leo COTA/L Occupational Therapy Assistant          Therapy Suggested Charges     Code   Minutes Charges    None                 OT Discharge Summary  Anticipated Discharge Disposition (OT): home with 24/7 care, home with home health  Reason for Discharge: Discharge from facility, Per MD order  Outcomes  Achieved: Refer to plan of care for updates on goals achieved  Discharge Destination: [facility]      Melissa Hodges, OT  9/4/2018

## 2018-09-04 NOTE — PLAN OF CARE
Problem: Fall Risk (Adult)  Goal: Absence of Fall  Outcome: Ongoing (interventions implemented as appropriate)      Problem: Patient Care Overview  Goal: Plan of Care Review  Outcome: Ongoing (interventions implemented as appropriate)   09/03/18 4856   Coping/Psychosocial   Plan of Care Reviewed With patient     Goal: Individualization and Mutuality  Outcome: Ongoing (interventions implemented as appropriate)    Goal: Discharge Needs Assessment  Outcome: Ongoing (interventions implemented as appropriate)    Goal: Interprofessional Rounds/Family Conf  Outcome: Ongoing (interventions implemented as appropriate)      Problem: Skin Injury Risk (Adult)  Goal: Skin Health and Integrity  Outcome: Ongoing (interventions implemented as appropriate)      Problem: Pain, Acute (Adult)  Goal: Acceptable Pain Control/Comfort Level  Outcome: Ongoing (interventions implemented as appropriate)      Problem: Renal Failure/Kidney Injury, Acute (Adult)  Goal: Signs and Symptoms of Listed Potential Problems Will be Absent, Minimized or Managed (Renal Failure/Kidney Injury, Acute)  Outcome: Ongoing (interventions implemented as appropriate)

## 2018-09-04 NOTE — PROGRESS NOTES
NEPHROLOGY PROGRESS NOTE:    History of present illness:   Acute kidney injury, hypocalcemia.       events discussed with the family by bedside.    Patient Active Problem List   Diagnosis   • Friction burn of skin   • Abrasion of right hand   • Acute congestive heart failure (CMS/HCC)   • Age-related physical debility   • Amblyopia   • Artificial lens present   • S/P CABG (coronary artery bypass graft)   • Degenerative joint disease involving multiple joints   • Dehydration   • Depressive disorder   • Dyslipidemia   • Ecchymosis   • Generalized anxiety disorder   • Glaucoma   • Heart valve disorder   • Malignant neoplasm of skin   • Nausea   • Nonrheumatic mitral valve insufficiency   • Nonrheumatic tricuspid (valve) stenosis   • Nuclear senile cataract   • Osteoporosis   • Secondary pulmonary hypertension   • Peripheral edema   • Primary open angle glaucoma   • Repeated falls   • Restless legs   • Subdural hematoma (CMS/HCC)   • Noninfected skin tear of right leg   • Atrial flutter (CMS/HCC)   • Cataract   • Pseudophakia   • Rectal bleed   • Chronic midline low back pain without sciatica   • Blood in stool   • Primary open angle glaucoma of both eyes, moderate stage   • Mixed hyperlipidemia   • Primary open angle glaucoma of both eyes, severe stage   • Chronic pain of left knee   • Fall   • Hypothyroidism   • Closed fracture of acromial end of right clavicle   • Closed fracture of multiple ribs of right side   • Hypertension   • Neck pain   • Acute renal insufficiency   • Hypocalcemia       Medications:    aspirin 81 mg Oral Daily   brimonidine 1 drop Both Eyes TID   calcium carbonate 1 tablet Oral BID   carvedilol 12.5 mg Oral BID With Meals   cholecalciferol 5,000 Units Oral Daily   docusate sodium 100 mg Oral BID   famotidine 40 mg Oral Daily   latanoprost 1 drop Both Eyes Nightly   levothyroxine 112 mcg Oral Q AM   megestrol 400 mg Oral BID   mirtazapine 15 mg Oral Nightly   rOPINIRole 0.5 mg Oral Q PM         Vitals:    09/04/18 0158 09/04/18 0456 09/04/18 0709 09/04/18 0928   BP:  146/65 134/86    BP Location:  Right arm Right arm    Patient Position:  Lying Lying    Pulse: 74 79 70 63   Resp:  20 20    Temp:  97.9 °F (36.6 °C) 98 °F (36.7 °C)    TempSrc:  Oral Oral    SpO2:  97% 93%    Weight:   46.9 kg (103 lb 4.8 oz)    Height:         I/O last 3 completed shifts:  In: 720 [P.O.:720]  Out: -   I/O this shift:  In: 120 [P.O.:120]  Out: -     On examination:    Is awake alert.  Supple neck  Lungs clear  Heart without gallop  Soft abdomen  Extremities without significant edema     Laboratory results:      Recent Results (from the past 24 hour(s))   Basic Metabolic Panel    Collection Time: 09/04/18  5:48 AM   Result Value Ref Range    Glucose 98 60 - 100 mg/dL    BUN 17 7 - 21 mg/dL    Creatinine 0.89 0.50 - 1.00 mg/dL    Sodium 137 137 - 145 mmol/L    Potassium 4.4 3.5 - 5.1 mmol/L    Chloride 112 (H) 95 - 110 mmol/L    CO2 16.0 (L) 22.0 - 31.0 mmol/L    Calcium 8.9 8.4 - 10.2 mg/dL    eGFR Non African Amer 59 39 - 90 mL/min/1.73    BUN/Creatinine Ratio 19.1 7.0 - 25.0    Anion Gap 9.0 5.0 - 15.0 mmol/L   CBC Auto Differential    Collection Time: 09/04/18  5:48 AM   Result Value Ref Range    WBC 10.65 (H) 3.20 - 9.80 10*3/mm3    RBC 3.34 (L) 3.77 - 5.16 10*6/mm3    Hemoglobin 11.1 (L) 12.0 - 15.5 g/dL    Hematocrit 32.4 (L) 35.0 - 45.0 %    MCV 97.0 80.0 - 98.0 fL    MCH 33.2 26.5 - 34.0 pg    MCHC 34.3 31.4 - 36.0 g/dL    RDW 17.0 (H) 11.5 - 14.5 %    RDW-SD 60.0 (H) 36.4 - 46.3 fl    MPV 10.9 8.0 - 12.0 fL    Platelets 168 150 - 450 10*3/mm3    Neutrophil % 68.8 37.0 - 80.0 %    Lymphocyte % 16.2 10.0 - 50.0 %    Monocyte % 7.8 0.0 - 12.0 %    Eosinophil % 2.6 0.0 - 7.0 %    Basophil % 0.7 0.0 - 2.0 %    Immature Grans % 3.9 (H) 0.0 - 0.5 %    Neutrophils, Absolute 7.32 2.00 - 8.60 10*3/mm3    Lymphocytes, Absolute 1.73 0.60 - 4.20 10*3/mm3    Monocytes, Absolute 0.83 0.00 - 0.90 10*3/mm3    Eosinophils, Absolute  0.28 0.00 - 0.70 10*3/mm3    Basophils, Absolute 0.07 0.00 - 0.20 10*3/mm3    Immature Grans, Absolute 0.42 (H) 0.00 - 0.02 10*3/mm3   ]    Imaging Results (last 24 hours)     ** No results found for the last 24 hours. **            ASSESSMENT:      Acute renal failure  Hypocalcemia  Anorexia, weight loss  History of diastolic dysfunction  Osteoporosis  Leukocytosis, history of urinary tract infection  Vitamin D deficiency     PLAN:     Kidney function has improved and creatinine is under 1     Symptom has improved and now in the normal range.    Blood Pressure fairly well managed and she is on carvedilol.    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  We will get immunofluorescent studies.  Doubt further workup will help overall general condition, and likely will follow conservative measures.    Dr. Diez discussed with the patient's daughter.    Plan of care briefly discussed with the patient's daughter by bedside.  She wanted to be sure that patient does not get narcotics.      MD WILMAN Verdugo/Transcription disclaimer:   Some parts of this note dictated by josé miguel, with translation of spoken language to printed text.

## 2018-09-04 NOTE — PLAN OF CARE
Problem: Patient Care Overview  Goal: Plan of Care Review  Outcome: Ongoing (interventions implemented as appropriate)   09/04/18 1011   Coping/Psychosocial   Plan of Care Reviewed With patient   Plan of Care Review   Progress improving   OTHER   Outcome Summary pt responded well to therapy. pt amb 136 ft CGA w/ RW and genu valalexam L LE. pts BP low upon entry but improved throughout tx. no new goals met at this time. pt would continue to benefit from PT services.

## 2018-09-05 NOTE — TELEPHONE ENCOUNTER
Spoke with Daiana Arroyo (daughter).  Pt was discharged to [facility] for 1 night and is was not good for patient. She was going to have PT/OT there. She is at home as of this morning.  Per hospital instructions, she is to have 24/7 care for 2 weeks.  [facility] will be coming to help with routine errands, cleaning, etc.  Pt's daughter is requesting an order [facility] for skilled nursing (I could find a reason for this in her chart) and PT/OT.  Please advise, thank you

## 2018-09-05 NOTE — TELEPHONE ENCOUNTER
Daiana Arroyo-daughter called and wants to talk to nurse- said she left msg earlier today about getting home health and physical therapy set up for her mother. She can be reached at [phone].

## 2018-09-06 NOTE — TELEPHONE ENCOUNTER
Spoke with daughter.  Order sent to [facility] per Dr Bhardwaj.  Daughter verbalized understanding.

## 2018-09-06 NOTE — TELEPHONE ENCOUNTER
----- Message from Abilio Bhardwaj MD sent at 9/5/2018  4:03 PM CDT -----  Contact: DAUGHTER  Please arrange  ----- Message -----  From: Roque Letty JOE  Sent: 9/5/2018   9:18 AM  To: Abilio Bhardwaj MD    Tiffanie's daughter called and said, she has been in the hospital.  They said, she doesn't need to be alone, so they tried putting her in the nursing home, but it didn't work out so she is home with [facility] with her for 2 weeks in her home..The family is wanting Home Health to come out and do some PT and OT with her.    Her daughter's number is [phone]   Asking that someone please call her back.

## 2018-09-17 PROBLEM — S22.41XA CLOSED FRACTURE OF MULTIPLE RIBS OF RIGHT SIDE: Chronic | Status: RESOLVED | Noted: 2017-11-24 | Resolved: 2018-01-01

## 2018-09-17 NOTE — PROGRESS NOTES
Subjective   Tiffanie Arroyo is a 89 y.o. female.     Back Pain   This is a chronic problem. The current episode started more than 1 year ago. The problem is unchanged. The pain is present in the lumbar spine and sacro-iliac. Quality: sharp minly. The pain does not radiate. The pain is mild. The pain is the same all the time. The symptoms are aggravated by sitting and lying down. Pertinent negatives include no bladder incontinence, bowel incontinence or dysuria.   Hypertension   This is a chronic problem. The current episode started more than 1 year ago. The problem is unchanged. The problem is controlled. Pertinent negatives include no orthopnea, palpitations, peripheral edema, PND or shortness of breath.   Hypothyroidism   This is a chronic problem. The current episode started more than 1 year ago. The problem occurs constantly. The problem has been unchanged.   Depression   Visit Type: follow-up  Patient is not experiencing: depressed mood, insomnia, nervousness/anxiety, palpitations, shortness of breath, suicidal ideas, suicidal planning and thoughts of death.             Review of Systems   Constitutional: Positive for unexpected weight change.   Respiratory: Negative for shortness of breath.    Cardiovascular: Negative for palpitations, orthopnea and PND.   Gastrointestinal: Negative for bowel incontinence.   Genitourinary: Negative for bladder incontinence and dysuria.   Musculoskeletal: Positive for back pain.   Psychiatric/Behavioral: Negative for suicidal ideas. The patient is not nervous/anxious and does not have insomnia.        Objective   Physical Exam   Constitutional: She is oriented to person, place, and time. She appears well-developed and well-nourished. No distress.   HENT:   Head: Normocephalic and atraumatic.   Cardiovascular: Normal rate.  Exam reveals no gallop and no friction rub.    Murmur heard.   Systolic murmur is present with a grade of 2/6   Pulmonary/Chest: Effort normal and breath  sounds normal.   Musculoskeletal: She exhibits edema. She exhibits no tenderness.        Lumbar back: She exhibits pain. She exhibits no bony tenderness, no swelling, no edema, no deformity, no laceration and no spasm.     Vascular Status -  Her right foot exhibits abnormal foot edema. Her left foot exhibits abnormal foot edema.  Neurological: She is alert and oriented to person, place, and time.   Skin: Skin is warm and dry. She is not diaphoretic.   Psychiatric: She has a normal mood and affect. Her speech is normal and behavior is normal. Judgment and thought content normal.   Nursing note and vitals reviewed.      Assessment/Plan   Problems Addressed this Visit        Other    Age-related physical debility    Dehydration    Hypocalcemia - Primary    Relevant Orders    Comprehensive Metabolic Panel      Other Visit Diagnoses     Impaired functional mobility, balance, gait, and endurance        Essential hypertension        Anorexia                Comes in for [facility] recheck.  She was admitted with debility dehydration hypocalcemia.  Patient was at her calcium repleted and IV fluids given she had marked debility and was transferred to her nursing home.  She spent one day there and then went home.  She is getting home health with physical therapy at this time.  And is doing well.  She is eating better now also.  I'll in the hospital she only needed Tylenol for pain so we'll just continue to use Tylenol for pain right now she had been taken a half of her Lortab at home at night but we'll just switch to Tylenol.

## 2018-09-17 NOTE — TELEPHONE ENCOUNTER
Requested Refills Sent    ----- Message from Siobhan Melara sent at 9/17/2018 11:21 AM CDT -----  Contact: [facility]  Pt needs refill of Coreg sent to [facility]. Pharmacy said that sitter would be there this afternoon to .

## 2018-09-17 NOTE — PROGRESS NOTES
Subjective   Subjective   Tiffanie Arroyo is a 89 y.o. female.     Back Pain   This is a chronic problem. The current episode started more than 1 year ago. The problem is unchanged. The pain is present in the lumbar spine and sacro-iliac. Quality: sharp minly. The pain does not radiate. The pain is at a severity of 8/10. The pain is moderate. The pain is the same all the time. The symptoms are aggravated by sitting and lying down. Stiffness is present in the morning. Associated symptoms include leg pain. Pertinent negatives include no bladder incontinence, bowel incontinence or dysuria.   Leg Pain      Knee Pain    The incident occurred more than 1 week ago. The pain is present in the left knee and right knee. The pain is at a severity of 8/10.        The following portions of the patient's history were reviewed and updated as appropriate: allergies, current medications, past family history, past medical history, past social history, past surgical history and problem list.    Review of Systems   Gastrointestinal: Negative for bowel incontinence.   Genitourinary: Negative for bladder incontinence and dysuria.   Musculoskeletal: Positive for back pain.       Objective   Physical Exam   Constitutional: She is oriented to person, place, and time. She appears well-developed and well-nourished. No distress.   HENT:   Head: Normocephalic and atraumatic.   Musculoskeletal:        Left knee: She exhibits decreased range of motion and swelling. Tenderness found.        Lumbar back: She exhibits decreased range of motion, tenderness and pain. She exhibits no bony tenderness, no swelling, no edema, no deformity, no laceration and no spasm.     Vascular Status -  Her right foot exhibits no edema. Her left foot exhibits no edema.  Neurological: She is alert and oriented to person, place, and time.   Reflex Scores:       Patellar reflexes are 2+ on the right side and 2+ on the left side.  Skin: Skin is warm and dry. She is not  diaphoretic.   Psychiatric: She has a normal mood and affect. Her behavior is normal. Judgment and thought content normal.   Nursing note and vitals reviewed.      Assessment/Plan   Problems Addressed this Visit     None                   Tiffanie Arroyo is a 89 y.o. female.     History of Present Illness     {Common H&P Review Areas:84746}    Review of Systems    Objective   Physical Exam    Assessment/Plan   {Assess/PlanSmartLinks:47484}          Current outpatient and discharge medications have been reconciled for the patient.  Reviewed by: Abilio Bhardwaj MD

## 2018-09-18 NOTE — PROGRESS NOTES
Pr Dr. Bhardwaj, Mr. Arroyo has been called with his Mothers recent lab results & recommendations.  Continue her current medications and follow-up as planned or sooner if any problems.    Stop her lasix, it has been removed from her med list  Lab ordered for 2 weeks

## 2018-09-18 NOTE — PROGRESS NOTES
Calcium is okay but getting dehydrated again.  Stop the Lasix.  Encourage fluids.  She now has 24 hour sitters.  Recheck BMP in 2 weeks

## 2018-09-18 NOTE — TELEPHONE ENCOUNTER
Pr Dr. Bhardwaj, Mr. Bone has been called with his Mothers recent lab results & recommendations.  Continue her current medications and follow-up as planned or sooner if any problems.    Stop her lasix, it has been removed from her med list  Lab ordered for 2 weeks    ----- Message from Abilio Bhardwaj MD sent at 9/18/2018  4:38 PM CDT -----  Calcium is okay but getting dehydrated again.  Stop the Lasix.  Encourage fluids.  She now has 24 hour sitters.  Recheck BMP in 2 weeks

## 2018-10-02 NOTE — TELEPHONE ENCOUNTER
William -son called and wants to talk to nurse- said he brought her to lab today andwhen they left that she scraped her leg and now has a wound.He said he put a bandage on it and is not bleeding but wants to check with Dr Bhardwaj about it-said her skin is really thin.

## 2018-10-02 NOTE — TELEPHONE ENCOUNTER
Spoke with YURI  He is bringing patient in to be evaluated by Dr Bhardwaj this afternoon.  Large wound on leg.

## 2018-10-02 NOTE — PROGRESS NOTES
Hydration has improved.  Her renal function is not all the way back to normal by suspect he will get there without any further changes.  Continue to hold the Lasix and encourage fluid

## 2018-10-02 NOTE — ED TRIAGE NOTES
Pt referred to ED by PCP for lac to left leg. Pt has [id number] lac/skin tear to anterior lower left leg. Pt's family states that PCP would not apply sutures to wound.

## 2018-10-02 NOTE — PROGRESS NOTES
Pr , MsGina Arroyo's Son JR has been called with her recent lab results & recommendations.  Continue her current medications and follow-up as planned or sooner if any problems.

## 2018-10-02 NOTE — TELEPHONE ENCOUNTER
Pr , Ms. Cruz's Son  has been called with her recent lab results & recommendations.  Continue her current medications and follow-up as planned or sooner if any problems.      ----- Message from Abilio Bhardwaj MD sent at 10/2/2018  3:16 PM CDT -----  Hydration has improved.  Her renal function is not all the way back to normal by suspect he will get there without any further changes.  Continue to hold the Lasix and encourage fluid

## 2018-10-03 NOTE — ED NOTES
Wound dressing on patient per Amaury BRITT. Jefry Ragland assisting patient in wheelchair for discharge.     Ellie Eid RN  10/02/18 2032

## 2018-10-18 NOTE — PROGRESS NOTES
Subjective   Tiffanie Arroyo is a 89 y.o. female.     Hypertension   This is a chronic (low today) problem. The current episode started more than 1 year ago. The problem has been waxing and waning since onset. The problem is uncontrolled.   Back Pain   This is a chronic problem. The current episode started more than 1 year ago. The problem is unchanged. The pain is present in the lumbar spine and sacro-iliac. Quality: sharp minly. The pain does not radiate. The pain is at a severity of 8/10. The pain is moderate. The pain is the same all the time. The symptoms are aggravated by sitting and lying down. Stiffness is present in the morning. Pertinent negatives include no bladder incontinence, bowel incontinence or dysuria.   Knee Pain    The incident occurred more than 1 week ago. The pain is present in the left knee and right knee. The pain is at a severity of 8/10.   URI    This is a new problem. The current episode started 1 to 4 weeks ago. The problem has been waxing and waning. There has been no fever. Associated symptoms include congestion, coughing and a sore throat. Pertinent negatives include no dysuria.   Wound Check   She was originally treated 5 to 10 days ago. Previous treatment included wound cleansing or irrigation. There has been no drainage from the wound. There is no redness present. There is no swelling present. The pain has improved. There is difficulty moving the extremity or digit due to weakness.        The following portions of the patient's history were reviewed and updated as appropriate: allergies, current medications, past family history, past medical history, past social history, past surgical history and problem list.    Review of Systems   HENT: Positive for congestion and sore throat.    Respiratory: Positive for cough.    Gastrointestinal: Negative for bowel incontinence.   Genitourinary: Negative for bladder incontinence and dysuria.   Musculoskeletal: Positive for back pain.        Objective   Physical Exam   Constitutional: She is oriented to person, place, and time. She appears well-developed and well-nourished. No distress.   HENT:   Head: Normocephalic and atraumatic.   Mouth/Throat: Uvula is midline and oropharynx is clear and moist. Mucous membranes are dry.   Cardiovascular: Normal rate, regular rhythm and normal heart sounds.  Exam reveals no gallop and no friction rub.    No murmur heard.  Pulmonary/Chest: Effort normal. No respiratory distress. She has no decreased breath sounds. She has no wheezes. She has rhonchi. She has no rales. She exhibits no tenderness.   Musculoskeletal:        Left knee: She exhibits decreased range of motion and swelling. Tenderness found.        Lumbar back: She exhibits decreased range of motion, tenderness and pain. She exhibits no bony tenderness, no swelling, no edema, no deformity, no laceration and no spasm.     Vascular Status -  Her right foot exhibits no edema. Her left foot exhibits no edema.  Neurological: She is alert and oriented to person, place, and time.   Reflex Scores:       Patellar reflexes are 2+ on the right side and 2+ on the left side.  Skin: Skin is warm and dry. Laceration noted. She is not diaphoretic.        Psychiatric: She has a normal mood and affect. Her behavior is normal. Judgment and thought content normal.   Nursing note and vitals reviewed.      Assessment/Plan   Problems Addressed this Visit        Nervous and Auditory    Chronic midline low back pain without sciatica      Other Visit Diagnoses     Hypotension due to drugs    -  Primary    Relevant Orders    Comprehensive Metabolic Panel    CBC & Differential    CBC Auto Differential    Laceration of left lower leg, subsequent encounter        Relevant Orders    Ambulatory Referral to Wound Clinic    Bronchitis        Encounter for removal of sutures              Steri strips and sutures removed, encourage PO

## 2018-10-19 NOTE — TELEPHONE ENCOUNTER
-Per Dr. Bhardwaj, Ms. Arroyo's son  has been called with her recent lab results & recommendations.  Continue her current medications and follow-up as planned or sooner if any problems.    Labs ordered      ---- Message from Abilio Bhardwaj MD sent at 10/19/2018  8:01 AM CDT -----  Dehydrated as expected. Encourage fluid as discussed and recheck bmp 2 weeks. Let us know if she cannot tolerate fluids

## 2018-10-19 NOTE — PROGRESS NOTES
Per Dr. Bhardwaj, Ms. Arroyo's son  has been called with her recent lab results & recommendations.  Continue her current medications and follow-up as planned or sooner if any problems.    Labs ordered

## 2018-10-19 NOTE — PROGRESS NOTES
Dehydrated as expected. Encourage fluid as discussed and recheck bmp 2 weeks. Let us know if she cannot tolerate fluids

## 2018-11-03 PROBLEM — R19.7 DIARRHEA: Status: ACTIVE | Noted: 2018-01-01

## 2018-11-03 PROBLEM — N30.01 ACUTE CYSTITIS WITH HEMATURIA: Status: ACTIVE | Noted: 2018-01-01

## 2018-11-03 NOTE — ED NOTES
Pt states she has been increasingly weak for 2 weeks and has had nausea and lack of appetite.       Chevy Jain RN  11/03/18 [id number]

## 2018-11-03 NOTE — ED NOTES
This RN unsuccessful for IV initiation. Will let Primary RN know.     Taylor Lorenz, RN  11/03/18 [id number]

## 2018-11-04 NOTE — H&P
"    HISTORY AND PHYSICAL  NAME: Tiffanie Arroyo  : 9/10/1926  MRN: [id number]    DATE OF ADMISSION: 18    DATE & TIME SEEN: 18 8:10 PM    PCP: Abilio Bhardwaj MD    CODE STATUS:   Code Status and Medical Interventions:   Ordered at: 18 2139     Limited Support to NOT Include:    Intubation    Cardioversion/Defibrillation     Code Status:    No CPR     Medical Interventions (Level of Support Prior to Arrest):    Limited       CHIEF COMPLAINT \"pain in my bottom\"    HPI:  Tiffanie Arroyo is a 89 y.o. female who presents with diarrhea and rectal pain.     Patient states that approximately 2 weeks ago she started to have episodes of diarrhea, reduced by mouth intake, and generalized malaise and fatigue.  Patient states that she has 1 episode of diarrhea a day at approximately 4-5 am every day over the last two weeks.  Patient states that she is drinking okay but is not eating as much as are normal.  Patient denies any fever but does endorse some chills.  Patient denies nausea, chest pain, shortness of air, any new or exotic foods.  Patient has recently been on clindamycin.    CONCURRENT MEDICAL HISTORY:  Past Medical History:   Diagnosis Date   • Acute congestive heart failure (CMS/HCC)    • Atrial flutter (CMS/HCC)    • Coronary arteriosclerosis    • Degenerative joint disease involving multiple joints    • Depressive disorder    • Generalized anxiety disorder    • Glaucoma    • Hypertension    • Nonrheumatic mitral valve insufficiency    • Nonrheumatic tricuspid valve disorder    • Osteoporosis    • Repeated falls    • Subdural hematoma (CMS/HCC)        PAST SURGICAL HISTORY:  Past Surgical History:   Procedure Laterality Date   • CORONARY ARTERY BYPASS GRAFT     • EXPLORATORY LAPAROTOMY     • HIP ARTHROPLASTY Left    • NECK LESION BIOPSY     • THYROIDECTOMY         FAMILY HISTORY:  Family History   Problem Relation Age of Onset   • Hypertension Father         SOCIAL HISTORY:  Social History "     Social History   • Marital status:      Spouse name: N/A   • Number of children: N/A   • Years of education: N/A     Occupational History   • Not on file.     Social History Main Topics   • Smoking status: Never Smoker   • Smokeless tobacco: Never Used   • Alcohol use No   • Drug use: No   • Sexual activity: Not Currently     Other Topics Concern   • Not on file     Social History Narrative   • No narrative on file       HOME MEDICATIONS:  Prior to Admission medications    Medication Sig Start Date End Date Taking? Authorizing Provider   ALPHAGAN P 0.1 % solution ophthalmic solution INSTILL 1 DROP IN BOTH EYES THREE TIMES DAILY 6/11/18  Yes Abilio Bhardwaj MD   aspirin 81 MG EC tablet Take 1 tablet by mouth daily.  Patient taking differently: Take 81 mg by mouth Every Night. 8/19/16  Yes Abilio Bhardwaj MD   calcium carbonate (TUMS) 500 MG chewable tablet Chew 500 mg 2 (Two) Times a Day. 9/4/18  Yes Jb Stokes MD   carvedilol (COREG) 6.25 MG tablet Take 1 tablet by mouth 2 (Two) Times a Day With Meals. 9/17/18  Yes Abilio Bhardwaj MD   cholecalciferol 5000 units tablet Take 5,000 Units by mouth Daily. 9/5/18  Yes Jb Stokes MD   latanoprost (XALATAN) 0.005 % ophthalmic solution Administer 1 drop to both eyes Every Night. 8/14/17  Yes Sebastian Jay MD   levothyroxine (SYNTHROID, LEVOTHROID) 112 MCG tablet TAKE 1 TAB DAILY FOR THYROID, ON EMPTY STOMACH -- EITHER 1 HOUR BEFORE EATING OR 2 HOURS AFTER 6/25/18  Yes Abilio Bhardwaj MD   megestrol (MEGACE ORAL) 40 MG/ML suspension Take 10 mL by mouth 2 (Two) Times a Day. 8/16/18  Yes Abilio Bhardwaj MD   mirtazapine (REMERON) 15 MG tablet Take 1 tablet by mouth Every Night. Stop zoloft 6/11/18  Yes Abilio Bhardwaj MD   rOPINIRole (REQUIP) 0.5 MG tablet TAKE 1 TABLET  EVERY EVENING  Patient taking differently: TAKE 1 TABLET BY MOUTH EVERY EVENING 7/9/18  Yes Abilio Bhardwaj MD   clindamycin (CLEOCIN) 300 MG capsule Take 1  capsule by mouth 3 (Three) Times a Day. 10/2/18   Amaury Mathews PA   docusate sodium (DOK) 100 MG capsule Take 1 capsule by mouth 2 (Two) Times a Day. 7/17/18   Abilio Bhardwaj MD       ALLERGIES:  Codeine; Penicillins; and Sulfa antibiotics    REVIEW OF SYSTEMS  Review of Systems   Constitutional: Positive for activity change, appetite change and fatigue. Negative for fever.   HENT: Negative for ear pain and sore throat.    Eyes: Negative for pain and visual disturbance.   Respiratory: Negative for cough and shortness of breath.    Cardiovascular: Negative for chest pain and palpitations.   Gastrointestinal: Positive for diarrhea and nausea. Negative for abdominal pain, anal bleeding, blood in stool, constipation and vomiting.   Endocrine: Negative for cold intolerance and heat intolerance.   Genitourinary: Positive for dysuria and frequency. Negative for difficulty urinating.   Musculoskeletal: Negative for arthralgias and gait problem.   Skin: Negative for color change and rash.   Neurological: Positive for weakness. Negative for dizziness and headaches.   Hematological: Negative for adenopathy. Does not bruise/bleed easily.   Psychiatric/Behavioral: Negative for agitation, confusion and sleep disturbance.       PHYSICAL EXAM:  Temp:  [98.2 °F (36.8 °C)] 98.2 °F (36.8 °C)  Heart Rate:  [59-73] 73  Resp:  [16-18] 16  BP: (126-139)/(56-99) 126/60  Body mass index is 17.61 kg/m².     Physical Exam   Constitutional: She appears well-developed and well-nourished. No distress.   HENT:   Head: Normocephalic and atraumatic.   Right Ear: External ear normal.   Left Ear: External ear normal.   Nose: Nose normal.   Eyes: Pupils are equal, round, and reactive to light. Conjunctivae and EOM are normal.   Neck: Normal range of motion. Neck supple.   Cardiovascular: Normal rate, regular rhythm, normal heart sounds and intact distal pulses.    Pulmonary/Chest: Effort normal and breath sounds normal. No respiratory distress.  She has no wheezes. She has no rales. She exhibits no tenderness.   Diminished breath sounds bibasilar.   Abdominal: Soft. Bowel sounds are normal. She exhibits no distension and no mass. There is no tenderness. There is no rebound and no guarding.   Musculoskeletal: Normal range of motion. She exhibits no edema.   Neurological: She is alert.   Patient oriented to person and place.  On arrival in the patient's exam room I awoke her from sleep and she is not entirely oriented to time.  She does appear to answer questions appropriately though.   Skin: Skin is warm and dry. No rash noted. She is not diaphoretic. No erythema. No pallor.   Psychiatric: She has a normal mood and affect. Her behavior is normal.   Nursing note and vitals reviewed.      DIAGNOSTIC DATA:   Lab Results (last 24 hours)     Procedure Component Value Units Date/Time    Urinalysis With Culture If Indicated - Urine, Clean Catch [[id number]]  (Abnormal) Collected:  11/03/18 1814    Specimen:  Urine from Urine, Clean Catch Updated:  11/03/18 1826     Color, UA Yellow     Appearance, UA Cloudy (A)     pH, UA <=5.0     Specific Gravity, UA 1.026     Glucose, UA Negative     Ketones, UA Negative     Bilirubin, UA Negative     Blood, UA Trace (A)     Protein, UA Negative     Leuk Esterase, UA Moderate (2+) (A)     Nitrite, UA Positive (A)     Urobilinogen, UA 0.2 E.U./dL    Urinalysis, Microscopic Only - Urine, Clean Catch [[id number]]  (Abnormal) Collected:  11/03/18 1814    Specimen:  Urine from Urine, Clean Catch Updated:  11/03/18 1826     RBC, UA 13-20 (A) /HPF      WBC, UA 6-12 (A) /HPF      Bacteria, UA 2+ (A) /HPF      Squamous Epithelial Cells, UA None Seen /HPF      Hyaline Casts, UA None Seen /LPF      Methodology Automated Microscopy    Urine Culture - Urine, [[id number]] Collected:  11/03/18 1814    Specimen:  Urine from Urine, Clean Catch Updated:  11/03/18 1826    [place] Draw [[id number]] Collected:  11/03/18 1508    Specimen:  Blood Updated:   11/03/18 1630    Narrative:       The following orders were created for panel order [place] Draw.  Procedure                               Abnormality         Status                     ---------                               -----------         ------                     Light Blue Top[[id number]]                                                              Green Top (Gel)[[id number]]                                  Final result               Lavender Top[[id number]]                                     Final result               Gold Top - SST[[id number]]                                   Final result                 Please view results for these tests on the individual orders.    Gold Top - SST [[id number]] Collected:  11/03/18 1523    Specimen:  Blood from Arm, Right Updated:  11/03/18 1630     Extra Tube Hold for add-ons.     Comment: Auto resulted.       Green Top (Gel) [[id number]] Collected:  11/03/18 1508    Specimen:  Blood Updated:  11/03/18 1615     Extra Tube Hold for add-ons.     Comment: Auto resulted.       Lavender Top [[id number]] Collected:  11/03/18 1508    Specimen:  Blood Updated:  11/03/18 1615     Extra Tube hold for add-on     Comment: Auto resulted       CBC & Differential [[id number]] Collected:  11/03/18 1508    Specimen:  Blood Updated:  11/03/18 1533    Narrative:       The following orders were created for panel order CBC & Differential.  Procedure                               Abnormality         Status                     ---------                               -----------         ------                     Scan Slide[[id number]]                                                                  CBC Auto Differential[[id number]]        Abnormal            Final result                 Please view results for these tests on the individual orders.    CBC Auto Differential [[id number]]  (Abnormal) Collected:  11/03/18 1523    Specimen:  Blood from Arm, Right Updated:  11/03/18 1533     WBC 15.29 (H)  10*3/mm3      RBC 4.01 10*6/mm3      Hemoglobin 13.4 g/dL      Hematocrit 38.5 %      MCV 96.0 fL      MCH 33.4 pg      MCHC 34.8 g/dL      RDW 16.1 (H) %      RDW-SD 55.7 (H) fl      MPV 9.7 fL      Platelets 238 10*3/mm3      Neutrophil % 76.3 %      Lymphocyte % 13.8 %      Monocyte % 6.4 %      Eosinophil % 0.4 %      Basophil % 0.2 %      Immature Grans % 2.9 (H) %      Neutrophils, Absolute 11.66 (H) 10*3/mm3      Lymphocytes, Absolute 2.11 10*3/mm3      Monocytes, Absolute 0.98 (H) 10*3/mm3      Eosinophils, Absolute 0.06 10*3/mm3      Basophils, Absolute 0.03 10*3/mm3      Immature Grans, Absolute 0.45 (H) 10*3/mm3     Comprehensive Metabolic Panel [[id number]]  (Abnormal) Collected:  11/03/18 1508    Specimen:  Blood Updated:  11/03/18 1532     Glucose 92 mg/dL      BUN 45 (H) mg/dL      Creatinine 1.37 (H) mg/dL      Sodium 136 (L) mmol/L      Potassium 4.3 mmol/L      Chloride 110 mmol/L      CO2 13.0 (L) mmol/L      Calcium 9.4 mg/dL      Total Protein 7.0 g/dL      Albumin 3.50 g/dL      ALT (SGPT) 18 U/L      AST (SGOT) 23 U/L      Alkaline Phosphatase 94 U/L      Total Bilirubin 0.6 mg/dL      eGFR Non African Amer 36 (L) mL/min/1.73      Globulin 3.5 gm/dL      A/G Ratio 1.0 (L) g/dL      BUN/Creatinine Ratio 32.8 (H)     Anion Gap 13.0 mmol/L     Narrative:       The MDRD GFR formula is only valid for adults with stable renal function between ages 18 and 70.        Estimated Creatinine Clearance: 19.9 mL/min (A) (by C-G formula based on SCr of 1.37 mg/dL (H)).     Imaging Results (last 24 hours)     Procedure Component Value Units Date/Time    XR Abdomen 2 View With Chest 1 View [[id number]] Collected:  11/03/18 1423     Updated:  11/03/18 1451    Narrative:       Acute Abdominal Series Withe Upright Chest.    History: Abdominal pain    Upright frontal film of the chest and supine and upright films of  the abdomen were obtained.    Comparison: September 1, 2018    The lungs are clear of an acute  process.  Coronary artery bypass.  Minimal cardiomegaly and coronary stent.  The pulmonary vasculature is not increased.  No pleural effusion.  No pneumothorax.  No acute osseous abnormality.  Osteoporosis.  Old fracture right clavicle.  Old bilateral rib fractures.  Prior vertebroplasties T11, T12 and L1.    No free air.  No mechanical bowel obstruction.  No organomegaly.  Splenic granulomata.  Total left hip prosthesis with additional postoperative changes  involving the left iliac bone and some extrusion of  methylmethacrylate about the prosthetic acetabulum.  Old right pubic rami fractures.      Impression:       Conclusion:  No acute process identified in the abdomen.  Coronary artery bypass and coronary stent.  Minimal cardiomegaly.    [id number]    Electronically signed by:  Juanjo Fried MD  11/3/2018 2:50 PM CDT  Workstation: Tyba reviewed the patient's new clinical results.    ASSESSMENT AND PLAN: This is a 89 y.o. female with:    Active Hospital Problems    Diagnosis Date Noted   • **Dehydration [E86.0] 08/19/2016   • Acute cystitis with hematuria [N30.01] 11/03/2018   • Diarrhea [R19.7] 11/03/2018   • Hypothyroidism [E03.9] 11/24/2017   • Depressive disorder [F32.9] 08/19/2016       #.  Dehydration.  Suspect related to patient's reduced by mouth intake and diarrhea.  Gentle rehydration with close monitoring for volume overload.  Monitor intake and output.  #.  Acute cystitis.  Levaquin given in emergency department.  Urine culture pending.  #.  Diarrhea.  Possible infectious etiology.  Stool cultures pending.  #.  Hypothyroidism.  Home Synthroid.  #.  Deconditioning.  PT OT.  #.  CKD 3. Near baseline. IVF's. Gentle hydration. Monitor.  #.  RLS. Requip.    Will monitor patient's hospital course and adjust treatment as hospital course dictates.    DVT prophylaxis: SCDs  Code status is   Code Status and Medical Interventions:   Ordered at: 11/03/18 1164     Limited Support to NOT Include:     Intubation    Cardioversion/Defibrillation     Code Status:    No CPR     Medical Interventions (Level of Support Prior to Arrest):    Limited      [facility] # [id number], reviewed and consistent with patient reported medications.      I discussed the patients findings and my recommendations with patient and nursing staff.           This document has been electronically signed by Chapito Kemp MD on November 3, 2018 8:10 PM

## 2018-11-04 NOTE — PLAN OF CARE
Problem: Patient Care Overview  Goal: Plan of Care Review  Outcome: Ongoing (interventions implemented as appropriate)   11/04/18 0215   Coping/Psychosocial   Plan of Care Reviewed With patient   Plan of Care Review   Progress no change   OTHER   Outcome Summary pt resting well, forgetful at times, no changes       Problem: Fall Risk (Adult)  Goal: Absence of Fall  Outcome: Ongoing (interventions implemented as appropriate)      Problem: Skin Injury Risk (Adult)  Goal: Skin Health and Integrity  Outcome: Ongoing (interventions implemented as appropriate)      Problem: Infection, Risk/Actual (Adult)  Goal: Infection Prevention/Resolution  Outcome: Ongoing (interventions implemented as appropriate)

## 2018-11-04 NOTE — PLAN OF CARE
Problem: Patient Care Overview  Goal: Plan of Care Review  Outcome: Ongoing (interventions implemented as appropriate)   11/04/18 0802   Coping/Psychosocial   Plan of Care Reviewed With patient   Plan of Care Review   Progress no change   OTHER   Outcome Summary no changes; will continue to monitor     Goal: Individualization and Mutuality  Outcome: Ongoing (interventions implemented as appropriate)    Goal: Discharge Needs Assessment  Outcome: Ongoing (interventions implemented as appropriate)    Goal: Interprofessional Rounds/Family Conf  Outcome: Ongoing (interventions implemented as appropriate)      Problem: Fall Risk (Adult)  Goal: Identify Related Risk Factors and Signs and Symptoms  Outcome: Outcome(s) achieved Date Met: 11/04/18    Goal: Absence of Fall  Outcome: Ongoing (interventions implemented as appropriate)      Problem: Skin Injury Risk (Adult)  Goal: Identify Related Risk Factors and Signs and Symptoms  Outcome: Outcome(s) achieved Date Met: 11/04/18    Goal: Skin Health and Integrity  Outcome: Ongoing (interventions implemented as appropriate)      Problem: Infection, Risk/Actual (Adult)  Goal: Identify Related Risk Factors and Signs and Symptoms  Outcome: Ongoing (interventions implemented as appropriate)    Goal: Infection Prevention/Resolution  Outcome: Ongoing (interventions implemented as appropriate)

## 2018-11-04 NOTE — PROGRESS NOTES
[facility] Medicine Services  INPATIENT PROGRESS NOTE    Length of Stay: 0  Date of Admission: 11/3/2018  Primary Care Physician: Abilio Bhardwaj MD    Subjective   Chief Complaint: nausea  HPI:    Has some improvement in nausea.  Wants to start eating now.  Feels much better    Review of Systems   Respiratory: Negative for shortness of breath.    Gastrointestinal: Positive for diarrhea.        All pertinent negatives and positives are as above. All other systems have been reviewed and are negative unless otherwise stated.     Objective    Temp:  [96.2 °F (35.7 °C)-97.9 °F (36.6 °C)] 97.3 °F (36.3 °C)  Heart Rate:  [59-79] 65  Resp:  [16-18] 18  BP: (118-145)/(56-99) 126/61  Physical Exam   Constitutional: She appears well-developed and well-nourished. No distress.   HENT:   Head: Normocephalic and atraumatic.   Cardiovascular: Normal rate.    Pulmonary/Chest: Effort normal. No respiratory distress. She has no wheezes.   Abdominal: Soft. She exhibits no distension.   Musculoskeletal: Normal range of motion. She exhibits no edema.   Neurological: She is alert. No cranial nerve deficit.   Skin: Skin is warm and dry. She is not diaphoretic.   Psychiatric: She has a normal mood and affect. Her behavior is normal. Judgment and thought content normal.   Vitals reviewed.          Results Review:  I have reviewed the labs, radiology results, and diagnostic studies.    Laboratory Data:   Lab Results (last 24 hours)     Procedure Component Value Units Date/Time    Basic Metabolic Panel [[id number]]  (Abnormal) Collected:  11/04/18 0615    Specimen:  Blood Updated:  11/04/18 0658     Glucose 71 mg/dL      BUN 38 (H) mg/dL      Creatinine 1.10 (H) mg/dL      Sodium 137 mmol/L      Potassium 4.3 mmol/L      Chloride 117 (H) mmol/L      CO2 12.0 (L) mmol/L      Calcium 7.8 (L) mg/dL      eGFR Non African Amer 46 mL/min/1.73      BUN/Creatinine Ratio 34.5 (H)     Anion Gap 8.0 mmol/L      Narrative:       The MDRD GFR formula is only valid for adults with stable renal function between ages 18 and 70.    CBC & Differential [[id number]] Collected:  11/04/18 0615    Specimen:  Blood Updated:  11/04/18 0649    Narrative:       The following orders were created for panel order CBC & Differential.  Procedure                               Abnormality         Status                     ---------                               -----------         ------                     CBC Auto Differential[[id number]]        Abnormal            Final result                 Please view results for these tests on the individual orders.    CBC Auto Differential [[id number]]  (Abnormal) Collected:  11/04/18 0615    Specimen:  Blood Updated:  11/04/18 0649     WBC 14.83 (H) 10*3/mm3      RBC 3.64 (L) 10*6/mm3      Hemoglobin 12.1 g/dL      Hematocrit 36.7 %      .8 (H) fL      MCH 33.2 pg      MCHC 33.0 g/dL      RDW 17.2 (H) %      RDW-SD 63.4 (H) fl      MPV 9.3 fL      Platelets 206 10*3/mm3      Neutrophil % 70.1 %      Lymphocyte % 17.6 %      Monocyte % 6.9 %      Eosinophil % 0.5 %      Basophil % 0.3 %      Immature Grans % 4.6 (H) %      Neutrophils, Absolute 10.39 (H) 10*3/mm3      Lymphocytes, Absolute 2.61 10*3/mm3      Monocytes, Absolute 1.03 (H) 10*3/mm3      Eosinophils, Absolute 0.08 10*3/mm3      Basophils, Absolute 0.04 10*3/mm3      Immature Grans, Absolute 0.68 (H) 10*3/mm3      nRBC 0.0 /100 WBC     Urine Culture - Urine, [[id number]]  (Normal) Collected:  11/03/18 1814    Specimen:  Urine from Urine, Clean Catch Updated:  11/04/18 0552     Urine Culture Culture in progress    TSH [[id number]]  (Normal) Collected:  11/03/18 1523    Specimen:  Blood from Arm, Right Updated:  11/03/18 2230     TSH 0.850 mIU/mL     Urinalysis With Culture If Indicated - Urine, Clean Catch [[id number]]  (Abnormal) Collected:  11/03/18 1814    Specimen:  Urine from Urine, Clean Catch Updated:  11/03/18 1826     Color, UA  Yellow     Appearance, UA Cloudy (A)     pH, UA <=5.0     Specific Gravity, UA 1.026     Glucose, UA Negative     Ketones, UA Negative     Bilirubin, UA Negative     Blood, UA Trace (A)     Protein, UA Negative     Leuk Esterase, UA Moderate (2+) (A)     Nitrite, UA Positive (A)     Urobilinogen, UA 0.2 E.U./dL    Urinalysis, Microscopic Only - Urine, Clean Catch [[id number]]  (Abnormal) Collected:  11/03/18 1814    Specimen:  Urine from Urine, Clean Catch Updated:  11/03/18 1826     RBC, UA 13-20 (A) /HPF      WBC, UA 6-12 (A) /HPF      Bacteria, UA 2+ (A) /HPF      Squamous Epithelial Cells, UA None Seen /HPF      Hyaline Casts, UA None Seen /LPF      Methodology Automated Microscopy    [place] Draw [[id number]] Collected:  11/03/18 1508    Specimen:  Blood Updated:  11/03/18 1630    Narrative:       The following orders were created for panel order [place] Draw.  Procedure                               Abnormality         Status                     ---------                               -----------         ------                     Light Blue Top[[id number]]                                                              Green Top (Gel)[[id number]]                                  Final result               Lavender Top[[id number]]                                     Final result               Gold Top - SST[[id number]]                                   Final result                 Please view results for these tests on the individual orders.    Gold Top - SST [[id number]] Collected:  11/03/18 1523    Specimen:  Blood from Arm, Right Updated:  11/03/18 1630     Extra Tube Hold for add-ons.     Comment: Auto resulted.       Green Top (Gel) [[id number]] Collected:  11/03/18 1508    Specimen:  Blood Updated:  11/03/18 1615     Extra Tube Hold for add-ons.     Comment: Auto resulted.       Lavender Top [[id number]] Collected:  11/03/18 1508    Specimen:  Blood Updated:  11/03/18 1615     Extra Tube hold for add-on     Comment:  Auto resulted       CBC & Differential [[id number]] Collected:  11/03/18 1508    Specimen:  Blood Updated:  11/03/18 1533    Narrative:       The following orders were created for panel order CBC & Differential.  Procedure                               Abnormality         Status                     ---------                               -----------         ------                     Scan Slide[[id number]]                                                                  CBC Auto Differential[[id number]]        Abnormal            Final result                 Please view results for these tests on the individual orders.    CBC Auto Differential [[id number]]  (Abnormal) Collected:  11/03/18 1523    Specimen:  Blood from Arm, Right Updated:  11/03/18 1533     WBC 15.29 (H) 10*3/mm3      RBC 4.01 10*6/mm3      Hemoglobin 13.4 g/dL      Hematocrit 38.5 %      MCV 96.0 fL      MCH 33.4 pg      MCHC 34.8 g/dL      RDW 16.1 (H) %      RDW-SD 55.7 (H) fl      MPV 9.7 fL      Platelets 238 10*3/mm3      Neutrophil % 76.3 %      Lymphocyte % 13.8 %      Monocyte % 6.4 %      Eosinophil % 0.4 %      Basophil % 0.2 %      Immature Grans % 2.9 (H) %      Neutrophils, Absolute 11.66 (H) 10*3/mm3      Lymphocytes, Absolute 2.11 10*3/mm3      Monocytes, Absolute 0.98 (H) 10*3/mm3      Eosinophils, Absolute 0.06 10*3/mm3      Basophils, Absolute 0.03 10*3/mm3      Immature Grans, Absolute 0.45 (H) 10*3/mm3     Comprehensive Metabolic Panel [[id number]]  (Abnormal) Collected:  11/03/18 1508    Specimen:  Blood Updated:  11/03/18 1532     Glucose 92 mg/dL      BUN 45 (H) mg/dL      Creatinine 1.37 (H) mg/dL      Sodium 136 (L) mmol/L      Potassium 4.3 mmol/L      Chloride 110 mmol/L      CO2 13.0 (L) mmol/L      Calcium 9.4 mg/dL      Total Protein 7.0 g/dL      Albumin 3.50 g/dL      ALT (SGPT) 18 U/L      AST (SGOT) 23 U/L      Alkaline Phosphatase 94 U/L      Total Bilirubin 0.6 mg/dL      eGFR Non African Amer 36 (L) mL/min/1.73       Globulin 3.5 gm/dL      A/G Ratio 1.0 (L) g/dL      BUN/Creatinine Ratio 32.8 (H)     Anion Gap 13.0 mmol/L     Narrative:       The MDRD GFR formula is only valid for adults with stable renal function between ages 18 and 70.          Culture Data:   No results found for: BLOODCX  Urine Culture   Date Value Ref Range Status   11/03/2018 Culture in progress  Preliminary     No results found for: RESPCX  No results found for: WOUNDCX  No results found for: STOOLCX  No components found for: BODYFLD    Radiology Data:   Imaging Results (last 24 hours)     Procedure Component Value Units Date/Time    XR Abdomen 2 View With Chest 1 View [[id number]] Collected:  11/03/18 1423     Updated:  11/03/18 1451    Narrative:       Acute Abdominal Series Withe Upright Chest.    History: Abdominal pain    Upright frontal film of the chest and supine and upright films of  the abdomen were obtained.    Comparison: September 1, 2018    The lungs are clear of an acute process.  Coronary artery bypass.  Minimal cardiomegaly and coronary stent.  The pulmonary vasculature is not increased.  No pleural effusion.  No pneumothorax.  No acute osseous abnormality.  Osteoporosis.  Old fracture right clavicle.  Old bilateral rib fractures.  Prior vertebroplasties T11, T12 and L1.    No free air.  No mechanical bowel obstruction.  No organomegaly.  Splenic granulomata.  Total left hip prosthesis with additional postoperative changes  involving the left iliac bone and some extrusion of  methylmethacrylate about the prosthetic acetabulum.  Old right pubic rami fractures.      Impression:       Conclusion:  No acute process identified in the abdomen.  Coronary artery bypass and coronary stent.  Minimal cardiomegaly.    [id number]    Electronically signed by:  Juanjo Fried MD  11/3/2018 2:50 PM CDT  Workstation: [id number]          I have reviewed the patient's current medications.     Assessment/Plan     Active Hospital Problems    Diagnosis   •  **Dehydration   • Acute cystitis with hematuria   • Diarrhea   • Hypothyroidism   • Depressive disorder     Continue IV fluids  Awaiting stool culture  Continue levaquin for UTI  zofran for nausea      Isaiah Diaz MD   11/04/18   12:24 PM

## 2018-11-04 NOTE — ED PROVIDER NOTES
Subjective   89-year-old female reports emergency department complaining of generalized weakness, diarrhea that has been chronic for the past one week.  The patient reports that she was seen by her primary care couple weeks ago and was diagnosed with some dehydration.  Patient has a small 2 cm area of abrasion with skin breakdown on her left lower ext.  This patient lives at home, and her family are helping with her care.  She uses a walker most days with ambulation.        History provided by:  Patient, caregiver and relative  History limited by:  Age   used: No        Review of Systems   Constitutional: Positive for activity change, appetite change and fatigue.   HENT: Negative for congestion, nosebleeds and postnasal drip.    Eyes: Negative for visual disturbance.   Respiratory: Negative for cough and wheezing.    Cardiovascular: Negative for chest pain and palpitations.   Gastrointestinal: Positive for diarrhea. Negative for abdominal pain.   Endocrine: Negative for polyuria.   Genitourinary: Negative for flank pain.   Musculoskeletal: Positive for arthralgias.   Skin: Positive for rash and wound.   Neurological: Positive for weakness. Negative for syncope.   Hematological: Negative for adenopathy.   Psychiatric/Behavioral: Negative for agitation and confusion.   All other systems reviewed and are negative.      Past Medical History:   Diagnosis Date   • Acute congestive heart failure (CMS/HCC)    • Atrial flutter (CMS/HCC)    • Coronary arteriosclerosis    • Degenerative joint disease involving multiple joints    • Depressive disorder    • Generalized anxiety disorder    • Glaucoma    • Hypertension    • Nonrheumatic mitral valve insufficiency    • Nonrheumatic tricuspid valve disorder    • Osteoporosis    • Repeated falls    • Subdural hematoma (CMS/HCC)        Allergies   Allergen Reactions   • Codeine Nausea Only and GI Intolerance   • Penicillins Hives   • Sulfa Antibiotics Other (See  Comments)     Occurred a long time ago - pt cannot remember reaction.       Past Surgical History:   Procedure Laterality Date   • CORONARY ARTERY BYPASS GRAFT     • EXPLORATORY LAPAROTOMY     • HIP ARTHROPLASTY Left    • NECK LESION BIOPSY     • THYROIDECTOMY         Family History   Problem Relation Age of Onset   • Hypertension Father        Social History     Social History   • Marital status:      Social History Main Topics   • Smoking status: Never Smoker   • Smokeless tobacco: Never Used   • Alcohol use No   • Drug use: No   • Sexual activity: Not Currently     Other Topics Concern   • Not on file           Objective   Physical Exam   Constitutional: She is oriented to person, place, and time. Vital signs are normal. She appears well-developed. She appears ill.   HENT:   Head: Normocephalic.   Right Ear: Decreased hearing is noted.   Left Ear: Decreased hearing is noted.   Nose: Nose normal.   Eyes: Pupils are equal, round, and reactive to light. Conjunctivae are normal.   Neck: Normal range of motion.   Cardiovascular: Normal rate, regular rhythm, S1 normal, S2 normal and normal heart sounds.    Pulmonary/Chest: Effort normal and breath sounds normal.   Abdominal: Soft.   Musculoskeletal: Normal range of motion.   Neurological: She is alert and oriented to person, place, and time. GCS eye subscore is 4. GCS verbal subscore is 5. GCS motor subscore is 6.   Skin: Capillary refill takes less than 2 seconds.        Psychiatric: She has a normal mood and affect.   Nursing note and vitals reviewed.      ECG 12 Lead    Date/Time: 11/3/2018 6:29 PM  Performed by: WENCESLAO ADAMS  Authorized by: WENCESLAO ADAMS   Interpreted by physician  Rhythm: sinus rhythm  Ectopy: atrial premature contractions  Rate: normal  QRS axis: normal  Conduction: conduction normal  Clinical impression: normal ECG                 ED Course      ..  XR Abdomen 2 View With Chest 1 View   Final Result   Conclusion:   No acute process  identified in the abdomen.   Coronary artery bypass and coronary stent.   Minimal cardiomegaly.      [id number]      Electronically signed by:  Juanjo Fried MD  11/3/2018 2:50 PM CDT   Workstation: [employer].  Results for orders placed or performed during the hospital encounter of 11/03/18   Comprehensive Metabolic Panel   Result Value Ref Range    Glucose 92 60 - 100 mg/dL    BUN 45 (H) 7 - 21 mg/dL    Creatinine 1.37 (H) 0.50 - 1.00 mg/dL    Sodium 136 (L) 137 - 145 mmol/L    Potassium 4.3 3.5 - 5.1 mmol/L    Chloride 110 95 - 110 mmol/L    CO2 13.0 (L) 22.0 - 31.0 mmol/L    Calcium 9.4 8.4 - 10.2 mg/dL    Total Protein 7.0 6.3 - 8.6 g/dL    Albumin 3.50 3.40 - 4.80 g/dL    ALT (SGPT) 18 9 - 52 U/L    AST (SGOT) 23 14 - 36 U/L    Alkaline Phosphatase 94 38 - 126 U/L    Total Bilirubin 0.6 0.2 - 1.3 mg/dL    eGFR Non  Amer 36 (L) 39 - 90 mL/min/1.73    Globulin 3.5 2.3 - 3.5 gm/dL    A/G Ratio 1.0 (L) 1.1 - 1.8 g/dL    BUN/Creatinine Ratio 32.8 (H) 7.0 - 25.0    Anion Gap 13.0 5.0 - 15.0 mmol/L   CBC Auto Differential   Result Value Ref Range    WBC 15.29 (H) 3.20 - 9.80 10*3/mm3    RBC 4.01 3.77 - 5.16 10*6/mm3    Hemoglobin 13.4 12.0 - 15.5 g/dL    Hematocrit 38.5 35.0 - 45.0 %    MCV 96.0 80.0 - 98.0 fL    MCH 33.4 26.5 - 34.0 pg    MCHC 34.8 31.4 - 36.0 g/dL    RDW 16.1 (H) 11.5 - 14.5 %    RDW-SD 55.7 (H) 36.4 - 46.3 fl    MPV 9.7 8.0 - 12.0 fL    Platelets 238 150 - 450 10*3/mm3    Neutrophil % 76.3 37.0 - 80.0 %    Lymphocyte % 13.8 10.0 - 50.0 %    Monocyte % 6.4 0.0 - 12.0 %    Eosinophil % 0.4 0.0 - 7.0 %    Basophil % 0.2 0.0 - 2.0 %    Immature Grans % 2.9 (H) 0.0 - 0.5 %    Neutrophils, Absolute 11.66 (H) 2.00 - 8.60 10*3/mm3    Lymphocytes, Absolute 2.11 0.60 - 4.20 10*3/mm3    Monocytes, Absolute 0.98 (H) 0.00 - 0.90 10*3/mm3    Eosinophils, Absolute 0.06 0.00 - 0.70 10*3/mm3    Basophils, Absolute 0.03 0.00 - 0.20 10*3/mm3    Immature Grans, Absolute 0.45 (H) 0.00 - 0.02 10*3/mm3    Urinalysis With Culture If Indicated - Urine, Clean Catch   Result Value Ref Range    Color, UA Yellow Yellow, Straw, Dark Yellow, Ana    Appearance, UA Cloudy (A) Clear    pH, UA <=5.0 5.0 - 9.0    Specific [place], UA 1.026 1.003 - 1.030    Glucose, UA Negative Negative    Ketones, UA Negative Negative    Bilirubin, UA Negative Negative    Blood, UA Trace (A) Negative    Protein, UA Negative Negative    Leuk Esterase, UA Moderate (2+) (A) Negative    Nitrite, UA Positive (A) Negative    Urobilinogen, UA 0.2 E.U./dL 0.2 - 1.0 E.U./dL   Urinalysis, Microscopic Only - Urine, Clean Catch   Result Value Ref Range    RBC, UA 13-20 (A) None Seen /HPF    WBC, UA 6-12 (A) None Seen, 0-2, 3-5 /HPF    Bacteria, UA 2+ (A) None Seen /HPF    Squamous Epithelial Cells, UA None Seen None Seen, 0-2 /HPF    Hyaline Casts, UA None Seen None Seen /LPF    Methodology Automated Microscopy    Green Top (Gel)   Result Value Ref Range    Extra Tube Hold for add-ons.    Lavender Top   Result Value Ref Range    Extra Tube hold for add-on    Gold Top - SST   Result Value Ref Range    Extra Tube Hold for add-ons.                  MDM      Final diagnoses:   Acute cystitis with hematuria   Dehydration   MOE (acute kidney injury) (CMS/[facility])   Nba Andre, APRN  11/03/18 1944

## 2018-11-05 NOTE — PLAN OF CARE
Problem: Patient Care Overview  Goal: Plan of Care Review  Outcome: Ongoing (interventions implemented as appropriate)   11/05/18 1417   Coping/Psychosocial   Plan of Care Reviewed With patient   Plan of Care Review   Progress no change   OTHER   Outcome Summary VS stable; wound care consult; PT eval; home tomorrow     Goal: Individualization and Mutuality  Outcome: Ongoing (interventions implemented as appropriate)    Goal: Discharge Needs Assessment  Outcome: Ongoing (interventions implemented as appropriate)    Goal: Interprofessional Rounds/Family Conf  Outcome: Ongoing (interventions implemented as appropriate)      Problem: Fall Risk (Adult)  Goal: Absence of Fall  Outcome: Ongoing (interventions implemented as appropriate)      Problem: Skin Injury Risk (Adult)  Goal: Skin Health and Integrity  Outcome: Ongoing (interventions implemented as appropriate)      Problem: Infection, Risk/Actual (Adult)  Goal: Identify Related Risk Factors and Signs and Symptoms  Outcome: Ongoing (interventions implemented as appropriate)    Goal: Infection Prevention/Resolution  Outcome: Ongoing (interventions implemented as appropriate)

## 2018-11-05 NOTE — PROGRESS NOTES
Discharge Planning Assessment  [facility]     Patient Name: Tiffanie Arroyo  MRN: [id number]  Today's Date: 11/5/2018    Admit Date: 11/3/2018          Discharge Needs Assessment     Row Name 11/05/18 1517       Living Environment    Lives With alone    Current Living Arrangements home/apartment/condo    Primary Care Provided by homecare agency   [facility] 24/7 caregivers.     Provides Primary Care For no one    Quality of Family Relationships involved;supportive    Able to Return to Prior Arrangements yes    Living Arrangement Comments Pt resides at home alone however has caregivers 24/7 arranged thru [facility].        Resource/Environmental Concerns    Resource/Environmental Concerns none    Transportation Concerns car, none       Transition Planning    Patient/Family Anticipates Transition to home    Patient/Family Anticipated Services at Transition home health care   [facility] in past no services currently.     Transportation Anticipated family or friend will provide       Discharge Needs Assessment    Concerns to be Addressed adjustment to diagnosis/illness    Equipment Currently Used at Home walker, rolling    Anticipated Changes Related to Illness none    Equipment Needed After Discharge --   Awaiting therapy recomendations.     Discharge Facility/Level of Care Needs home with home health    Offered/Gave Vendor List yes    Patient's Choice of Community Agency(s) [facility]    Current Discharge Risk chronically ill            Discharge Plan     Row Name 11/05/18 7132       Plan    Plan Comments LSW assesment complete. pt resides at home alone but has caregivers 24/7 arranged thru [facility]. Pt appears to have good support system. She uses rolling walker to assist with mobility. Pt has used [facility] home health in past no services currently. Her goal is to return home at d/c and resume care from [facility]. LSW awaiting additional recomendations from MD and therapy. LSW/case mgt will  follow up as consulted and complete arrangements as ordered.         Destination     No service coordination in this encounter.      Durable Medical Equipment     No service coordination in this encounter.      Dialysis/Infusion     No service coordination in this encounter.      Home Medical Care     No service coordination in this encounter.      Social Care     No service coordination in this encounter.        Expected Discharge Date and Time     Expected Discharge Date Expected Discharge Time    Nov 6, 2018               Demographic Summary     Row Name 11/05/18 1515       General Information    Admission Type observation    Referral Source high risk screening    Reason for Consult discharge planning    Preferred Language English     Used During This Interaction no       Contact Information    Contact Information Obtained for             Functional Status     Row Name 11/05/18 1516       Functional Status    Usual Activity Tolerance fair    Current Activity Tolerance fair    Functional Status Comments Uses rolling walker to assist with mobility.        Functional Status, IADL    Medications assistive person    Meal Preparation completely dependent    Housekeeping completely dependent    Laundry completely dependent    Shopping completely dependent    IADL Comments Has 24/7 caregivers thru [facility].        Mental Status Summary    Recent Changes in Mental Status/Cognitive Functioning no changes            Psychosocial    No documentation.           Abuse/Neglect    No documentation.           Legal    No documentation.           Substance Abuse    No documentation.           Patient Forms    No documentation.         LEXX Wheeler

## 2018-11-05 NOTE — PLAN OF CARE
Problem: Patient Care Overview  Goal: Plan of Care Review  Outcome: Ongoing (interventions implemented as appropriate)  Pt rested quietly in bed this shift   11/04/18 0802   Coping/Psychosocial   Plan of Care Reviewed With patient   Plan of Care Review   Progress no change   OTHER   Outcome Summary no changes; will continue to monitor     Goal: Individualization and Mutuality  Outcome: Ongoing (interventions implemented as appropriate)    Goal: Discharge Needs Assessment  Outcome: Ongoing (interventions implemented as appropriate)      Problem: Fall Risk (Adult)  Goal: Absence of Fall  Outcome: Ongoing (interventions implemented as appropriate)      Problem: Skin Injury Risk (Adult)  Goal: Skin Health and Integrity  Outcome: Ongoing (interventions implemented as appropriate)      Problem: Infection, Risk/Actual (Adult)  Goal: Identify Related Risk Factors and Signs and Symptoms  Outcome: Ongoing (interventions implemented as appropriate)    Goal: Infection Prevention/Resolution  Outcome: Ongoing (interventions implemented as appropriate)

## 2018-11-05 NOTE — CONSULTS
"Adult Nutrition  Assessment    Patient Name:  Tiffanie Arroyo  YOB: 1926  MRN: [id number]  Admit Date:  11/3/2018    Assessment Date:  11/5/2018    Comments:  RD consult r/t MST score 2 and BMI 17. Pt admitted r/t dehydration secondary to diarrhea for several days pta. Pt reports normal weight is ~120# but she doesn't think admit weight of 105# is accurate. Pt reports diarrhea has resolved and she is feeling better. Appetite is good and pt requested snack. RD offered supplement but pt refused stating she would not eat her meals if she drank those. RD will monitor hospital course and make recs accordingly.           Reason for Assessment     Row Name 11/05/18 1035          Reason for Assessment    Reason For Assessment identified at risk by screening criteria     Diagnosis fluid status     Identified At Risk by Screening Criteria MST SCORE 2+;BMI               Nutrition/Diet History     Row Name 11/05/18 1035          Nutrition/Diet History    Typical Food/Fluid Intake Pt says she is unsure of any recent wt loss but says she normally weighs ~120#, current bedscale reading is 114#---RD unsure if admit wt of 105# is accurate.  Pt indicates diarrhea has resolved. Appetite is good. She does not want supplements, \"if i drink that i won't eat my food\". Asked for snack---RD provided.                Labs/Tests/Procedures/Meds     Row Name 11/05/18 1037          Labs/Procedures/Meds    Lab Results Reviewed reviewed        Medications    Pertinent Medications Reviewed reviewed               Estimated/Assessed Needs     Row Name 11/05/18 1038          Calculation Measurements    Weight Used For Calculations 56.7 kg (125 lb)        Estimated/Assessed Needs    Additional Documentation Protein Requirements (Group);Fluid Requirements (Group);Calorie Requirements (Group)        Calorie Requirements    Estimated Calorie Requirement (kcal/day) 1400        KCAL/KG    14 Kcal/Kg (kcal) 793.8     15 Kcal/Kg (kcal) 850.5  "    18 Kcal/Kg (kcal) 1020.6     20 Kcal/Kg (kcal) 1134     25 Kcal/Kg (kcal) 1417.5     30 Kcal/Kg (kcal) 1701     35 Kcal/Kg (kcal) 1984.5     40 Kcal/Kg (kcal) 2268     45 Kcal/Kg (kcal) 2551.5     50 Kcal/Kg (kcal) 2835        [place]-St. Jeor Equation    RMR ([place]-St. Jeor Equation) 977.88        Protein Requirements    Est Protein Requirement Amount (gms/kg) 0.8 gm protein     Estimated Protein Requirements (gms/day) 45.36        Fluid Requirements    Estimated Fluid Requirements (mL/day) 1400     RDA Method (mL) 1400     [place]athaar Method (over 20 kg) 2634             Nutrition Prescription Ordered     Row Name 11/05/18 1038          Nutrition Prescription PO    Current PO Diet Regular     Fluid Consistency Thin             Evaluation of Received Nutrient/Fluid Intake     Row Name 11/05/18 1038          Calculation Measurements    Weight Used For Calculations 56.7 kg (125 lb)        PO Evaluation    Number of Days PO Intake Evaluated Insufficient Data             Evaluation of Prescribed Nutrient/Fluid Intake     Row Name 11/05/18 1038          Calculation Measurements    Weight Used For Calculations 56.7 kg (125 lb)           Electronically signed by:  Oma Batres RD  11/05/18 10:40 AM

## 2018-11-05 NOTE — PLAN OF CARE
Problem: Patient Care Overview  Goal: Plan of Care Review  Outcome: Ongoing (interventions implemented as appropriate)   11/05/18 3048   Coping/Psychosocial   Plan of Care Reviewed With patient   OTHER   Outcome Summary PT evaluation completed this date. Pt reporting fatigue, but agreeable to evaluation. Pt also reporting that she has 24/7 care/ nursing at home who help her as needed with ADLs and mobility tasks. Pt able to perform sit<> supine with Milagros and sit<>stand with Milagros and FW walker. Pt able to take a few steps at EOB with FW walker before requesting to stop d/t fatigue. Pt would benefit from further skilled PT to increase independence, functional mobility, safety, and endurance. Upon d/c, recommend home with 24/7 care and HH PT.

## 2018-11-05 NOTE — THERAPY EVALUATION
Acute Care - Physical Therapy Initial Evaluation  [facility]     Patient Name: Tiffanie Arroyo  : 9/10/1926  MRN: [id number]  Today's Date: 2018   Onset of Illness/Injury or Date of Surgery: 18  Date of Referral to PT: 18  Referring Physician: LINNEA Alonso MD      Admit Date: 11/3/2018    Visit Dx:     ICD-10-CM ICD-9-CM   1. Acute cystitis with hematuria N30.01 595.0   2. Dehydration E86.0 276.51   3. MOE (acute kidney injury) (CMS/HCC) N17.9 584.9   4. Weakness R53.1 780.79   5. Impaired functional mobility, balance, gait, and endurance Z74.09 V49.89     Patient Active Problem List   Diagnosis   • Acute congestive heart failure (CMS/HCC)   • Age-related physical debility   • Amblyopia   • Artificial lens present   • S/P CABG (coronary artery bypass graft)   • Degenerative joint disease involving multiple joints   • Dehydration   • Depressive disorder   • Dyslipidemia   • Ecchymosis   • Generalized anxiety disorder   • Glaucoma   • Heart valve disorder   • Malignant neoplasm of skin   • Nausea   • Nonrheumatic mitral valve insufficiency   • Nonrheumatic tricuspid (valve) stenosis   • Nuclear senile cataract   • Osteoporosis   • Secondary pulmonary hypertension   • Peripheral edema   • Primary open angle glaucoma   • Repeated falls   • Restless legs   • Subdural hematoma (CMS/HCC)   • Atrial flutter (CMS/HCC)   • Cataract   • Pseudophakia   • Rectal bleed   • Chronic midline low back pain without sciatica   • Blood in stool   • Primary open angle glaucoma of both eyes, moderate stage   • Mixed hyperlipidemia   • Primary open angle glaucoma of both eyes, severe stage   • Chronic pain of left knee   • Fall   • Hypothyroidism   • Closed fracture of acromial end of right clavicle   • Hypertension   • Neck pain   • Acute renal insufficiency   • Hypocalcemia   • Acute cystitis with hematuria   • Diarrhea     Past Medical History:   Diagnosis Date   • Acute congestive heart failure (CMS/HCC)    •  Atrial flutter (CMS/HCC)    • Coronary arteriosclerosis    • Degenerative joint disease involving multiple joints    • Depressive disorder    • Generalized anxiety disorder    • Glaucoma    • Hypertension    • Nonrheumatic mitral valve insufficiency    • Nonrheumatic tricuspid valve disorder    • Osteoporosis    • Repeated falls    • Subdural hematoma (CMS/HCC)      Past Surgical History:   Procedure Laterality Date   • CORONARY ARTERY BYPASS GRAFT     • EXPLORATORY LAPAROTOMY     • HIP ARTHROPLASTY Left    • NECK LESION BIOPSY     • THYROIDECTOMY          PT ASSESSMENT (last 12 hours)      Physical Therapy Evaluation     Row Name 11/05/18 1354          PT Evaluation Time/Intention    Subjective Information complains of;fatigue  -KW     Document Type evaluation  -KW     Mode of Treatment individual therapy;physical therapy  -KW     Patient Effort good  -KW     Symptoms Noted During/After Treatment fatigue  -KW     Row Name 11/05/18 135          General Information    Patient Profile Reviewed? yes  -KW     Onset of Illness/Injury or Date of Surgery 11/03/18  -KW     Referring Physician LINNEA Alonso MD  -KW     Patient Observations alert;cooperative;agree to therapy  -KW     Patient/Family Observations No family present   -KW     General Observations of Patient on room air, supine in bed, IV  -KW     Prior Level of Function independent:;gait;dressing;bathing   sitters 24/7; do cooking, cleaning  -KW     Equipment Currently Used at Home walker, rolling;wheelchair;commode, bedside;grab bar;bath bench  -KW     Pertinent History of Current Functional Problem Pt is a 89 yo female admittd d/t weakne and nauea.   -KW     Existing Precautions/Restrictions fall  -KW     Limitations/Impairments safety/cognitive  -KW     Equipment Issued to Patient gait belt  -KW     Risks Reviewed patient:;LOB;nausea/vomiting;dizziness;increased discomfort;change in vital signs;increased drainage;lines disloged  -KW     Benefits Reviewed  patient:;improve function;increase independence;increase strength;increase balance;decrease pain;decrease risk of DVT;improve skin integrity;increase knowledge  -KW     Row Name 11/05/18 1351          Relationship/Environment    Lives With alone   has sitters 24/7  -KW     Concerns About Impact on Relationships Sitters present 24/7; assist as needed   -KW     Row Name 11/05/18 1357          Resource/Environmental Concerns    Current Living Arrangements home/apartment/condo  -KW     Row Name 11/05/18 1359          Cognitive Assessment/Interventions    Additional Documentation Cognitive Assessment/Intervention (Group)  -KW     Row Name 11/05/18 1356          Cognitive Assessment/Intervention- PT/OT    Affect/Mental Status (Cognitive) WFL;confused   confused at times to location and what was happening   -KW     Orientation Status (Cognition) oriented to;person;place  -KW     Follows Commands (Cognition) WFL  -KW     Cognitive Function (Cognitive) memory deficit  -KW     Memory Deficit (Cognitive) mild deficit  -KW     Safety Deficit (Cognitive) mild deficit  -KW     Personal Safety Interventions fall prevention program maintained;gait belt;nonskid shoes/slippers when out of bed;supervised activity  -KW     Row Name 11/05/18 7229          Safety Issues, Functional Mobility    Safety Issues Affecting Function (Mobility) friction/shear risk  -KW     Impairments Affecting Function (Mobility) balance;endurance/activity tolerance;strength  -KW     Row Name 11/05/18 1359          Bed Mobility Assessment/Treatment    Bed Mobility Assessment/Treatment rolling left;rolling right;sit-supine;supine-sit  -KW     Rolling Left [place] (Bed Mobility) minimum assist (75% patient effort)  -KW     Rolling Right [place] (Bed Mobility) minimum assist (75% patient effort)  -KW     Supine-Sit [place] (Bed Mobility) minimum assist (75% patient effort)  -KW     Sit-Supine [place] (Bed Mobility) minimum assist (75% patient  effort)  -KW     Bed Mobility, Safety Issues decreased use of arms for pushing/pulling;decreased use of legs for bridging/pushing  -KW     Assistive Device (Bed Mobility) bed rails;head of bed elevated  -KW     Row Name 11/05/18 1354          Transfer Assessment/Treatment    Transfer Assessment/Treatment sit-stand transfer;stand-sit transfer  -KW     Sit-Stand [place] (Transfers) contact guard  -KW     Stand-Sit [place] (Transfers) contact guard  -KW     Row Name 11/05/18 1354          Sit-Stand Transfer    Assistive Device (Sit-Stand Transfers) walker, front-wheeled  -KW     Row Name 11/05/18 1354          Stand-Sit Transfer    Assistive Device (Stand-Sit Transfers) walker, front-wheeled  -KW     Row Name 11/05/18 1354          Gait/Stairs Assessment/Training    Gait/Stairs Assessment/Training gait/ambulation assistive device;distance ambulated;gait pattern  -KW     [place] Level (Gait) contact guard;verbal cues   for positioning of walker  -KW     Assistive Device (Gait) walker, front-wheeled  -KW     Distance in Feet (Gait) 5ft at bedside  -KW     Pattern (Gait) step-through  -KW     Deviations/Abnormal Patterns (Gait) gait speed decreased  -KW     Row Name 11/05/18 1354          General ROM    GENERAL ROM COMMENTS BLE AROM WFL   -KW     [facility] Name 11/05/18 1354          MMT (Manual Muscle Testing)    General MMT Comments BLE grossly 3+/5; pt with some dificulty following MMT directions  -KW     Row Name 11/05/18 1354          Sensory Assessment/Intervention    Sensory General Assessment no sensation deficits identified   BLE light touch  -KW     [facility] Name 11/05/18 1354          Vision Assessment/Intervention    Visual Impairment/Limitations corrective lenses for reading  -KW     Row Name 11/05/18 1354          Pain Assessment    Additional Documentation Pain Scale: Numbers Pre/Post-Treatment (Group)  -KW     [facility] Name 11/05/18 1354          Pain Scale: Numbers Pre/Post-Treatment    Pain Scale:  Numbers, Pretreatment 0/10 - no pain  -KW     Pain Scale: Numbers, Post-Treatment 0/10 - no pain  -KW     Row Name             Wound 11/05/18 0904 Left midline laceration    Wound - Properties Group Date first assessed: 11/05/18 -ND Time first assessed: 0904  -ND Present On Admission : yes  -ND Side: Left  -ND Orientation: midline  -ND Type: laceration  -ND    Row Name             Wound 11/05/18 0904 Right medial leg skin tear    Wound - Properties Group Date first assessed: 11/05/18 -ND Time first assessed: 0904  -ND Present On Admission : yes  -ND Side: Right  -ND Orientation: medial  -ND Location: leg  -ND Type: skin tear  -ND    Row Name 11/05/18 1354          Plan of Care Review    Plan of Care Reviewed With patient  -KW     Row Name 11/05/18 1354          Physical Therapy Clinical Impression    Date of Referral to PT 11/05/18  -KW     PT Diagnosis (PT Clinical Impression) Impaired functional mobility, balance, gait, and endurace  -KW     Prognosis (PT Clinical Impression) good  -KW     Functional Level at Time of Evaluation (PT Clinical Impression) CGA to Milagros with mobility  -KW     Patient/Family Goals Statement (PT Clinical Impression) return home   -KW     Criteria for Skilled Interventions Met (PT Clinical Impression) yes;treatment indicated  -KW     Pathology/Pathophysiology Noted (Describe Specifically for Each System) musculoskeletal;cardiovascular;pulmonary  -KW     Impairments Found (describe specific impairments) aerobic capacity/endurance;gait, locomotion, and balance  -KW     Functional Limitations in Following Categories (Describe Specific Limitations) self-care;home management;community/leisure  -KW     Disability: Inability to Perform Actions/Activities of Required Roles (describe specific disability) community/leisure  -KW     Rehab Potential (PT Clinical Summary) good, to achieve stated therapy goals  -KW     Predicted Duration of Therapy (PT) until goals met or d/c from acute care  -KW      Care Plan Review (PT) evaluation/treatment results reviewed;care plan/treatment goals reviewed;risks/benefits reviewed;current/potential barriers reviewed;patient/other agree to care plan  -KW     Row Name 11/05/18 1354          Vital Signs    Pre Systolic BP Rehab 123  -KW     Pre Treatment Diastolic BP 65  -KW     Post Systolic BP Rehab 146  -KW     Post Treatment Diastolic BP 66  -KW     Pretreatment Heart Rate (beats/min) 65  -KW     Posttreatment Heart Rate (beats/min) 82  -KW     Pre SpO2 (%) 91  -KW     O2 Delivery Pre Treatment room air  -KW     Post SpO2 (%) 95  -KW     O2 Delivery Post Treatment room air  -KW     Pre Patient Position Supine  -KW     Intra Patient Position Sitting  -KW     Post Patient Position Supine  -KW     Row Name 11/05/18 1354          Physical Therapy Goals    Bed Mobility Goal Selection (PT) bed mobility, PT goal 1  -KW     Transfer Goal Selection (PT) transfer, PT goal 1  -KW     Gait Training Goal Selection (PT) gait training, PT goal 1  -KW     Row Name 11/05/18 1390          Bed Mobility Goal 1 (PT)    Activity/Assistive Device (Bed Mobility Goal 1, PT) rolling to left;rolling to right;scooting;sit to supine;supine to sit  -KW     [place] Level/Cues Needed (Bed Mobility Goal 1, PT) conditional independence  -KW     Time Frame (Bed Mobility Goal 1, PT) 3 days  -KW     Progress/Outcomes (Bed Mobility Goal 1, PT) goal not met  -KW     Row Name 11/05/18 3238          Transfer Goal 1 (PT)    Activity/Assistive Device (Transfer Goal 1, PT) bed-to-chair/chair-to-bed;sit-to-stand/stand-to-sit;walker, rolling  -KW     [place] Level/Cues Needed (Transfer Goal 1, PT) conditional independence  -KW     Time Frame (Transfer Goal 1, PT) 3 days  -KW     Progress/Outcome (Transfer Goal 1, PT) goal not met  -KW     Row Name 11/05/18 6663          Gait Training Goal 1 (PT)    Activity/Assistive Device (Gait Training Goal 1, PT) gait (walking locomotion);assistive device use;decrease fall  risk;increase endurance/gait distance;walker, rolling  -KW     [place] Level (Gait Training Goal 1, PT) conditional independence  -KW     Distance (Gait Goal 1, PT) 25 ft or more  -KW     Time Frame (Gait Training Goal 1, PT) 5 days  -KW     Progress/Outcome (Gait Training Goal 1, PT) goal not met  -KW     Row Name 11/05/18 1354          Positioning and Restraints    Pre-Treatment Position in bed  -KW     Post Treatment Position bed  -KW     In Bed notified nsg;supine;call light within reach;encouraged to call for assist;exit alarm on;side rails up x2;heels elevated  -KW     Row Name 11/05/18 1354          Living Environment    Home Accessibility tub/shower is not walk in  -KW       User Key  (r) = Recorded By, (t) = Taken By, (c) = Cosigned By    Initials Name Provider Type    Alva Mcdonnell, RN Registered Nurse    Divina Leon, GAY Physical Therapist          Physical Therapy Education     Title: PT OT SLP Therapies (Active)     Topic: Physical Therapy (Active)     Point: Mobility training (Done)    Learning Progress Summary     Learner Status Readiness Method Response Comment Documented by    Patient Done Acceptance E VU Role of PT, POC, use of gait belt, d/c recommendations, having staff member with pt when OOB KW 11/05/18 1506                      User Key     Initials Effective Dates Name Provider Type Discipline     07/23/18 -  Divina Vidal, GAY Physical Therapist PT                PT Recommendation and Plan  Anticipated Discharge Disposition (PT): home with 24/7 care, home with home health  Planned Therapy Interventions (PT Eval): balance training, bed mobility training, gait training, transfer training, stretching, strengthening, stair training, patient/family education  Therapy Frequency (PT Clinical Impression): other (see comments) (5-7x/week)  Outcome Summary/Treatment Plan (PT)  Anticipated Discharge Disposition (PT): home with 24/7 care, home with home health  Plan of Care Reviewed  With: patient  Outcome Summary: PT evaluation completed this date. Pt reporting fatigue, but agreeable to evaluation. Pt also reporting that she has 24/7 care/ nursing at home who help her as needed with ADLs and mobility tasks. Pt able to perform sit<> supine with Milagros and sit<>stand with Milagros and FW walker. Pt able to take a few steps at EOB with FW walker before requesting to stop d/t fatigue. Pt would benefit from further skilled PT to increase independence, functional mobility, safety, and endurance. Upon d/c, recommend home with 24/7 care and HH PT.           Outcome Measures     Row Name 11/05/18 1354             How much help from another person do you currently need...    Turning from your back to your side while in flat bed without using bedrails? 3  -KW      Moving from lying on back to sitting on the side of a flat bed without bedrails? 3  -KW      Moving to and from a bed to a chair (including a wheelchair)? 3  -KW      Standing up from a chair using your arms (e.g., wheelchair, bedside chair)? 3  -KW      Climbing 3-5 steps with a railing? 2  -KW      To walk in hospital room? 2  -KW      AM-PAC 6 Clicks Score 16  -KW         Functional Assessment    Outcome Measure Options AM-PAC 6 Clicks Basic Mobility (PT)  -KW        User Key  (r) = Recorded By, (t) = Taken By, (c) = Cosigned By    Initials Name Provider Type    Divina Leon PT Physical Therapist           Time Calculation:         PT Charges     Row Name 11/05/18 1511             Time Calculation    Start Time 1354  -KW      Stop Time 1440  -KW      Time Calculation (min) 46 min  -KW      PT Received On 11/05/18  -KW      PT Goal Re-Cert Due Date 11/18/18  -KW        User Key  (r) = Recorded By, (t) = Taken By, (c) = Cosigned By    Initials Name Provider Type    Divina Leon PT Physical Therapist        Therapy Suggested Charges     Code   Minutes Charges    None           Therapy Charges for Today     Code Description Service Date  Service Provider Modifiers Qty    13703410679 HC PT MOBILITY CURRENT 11/5/2018 Divina Vidal, PT GP, CK 1    44286085918 HC PT MOBILITY PROJECTED 11/5/2018 Divina Vidal, PT GP, CI 1    82313892970 HC PT EVAL MOD COMPLEXITY 3 11/5/2018 Divina Vidal, PT GP 1          PT G-Codes  PT Professional Judgement Used?: Yes  Outcome Measure Options: AM-PAC 6 Clicks Basic Mobility (PT)  AM-PAC 6 Clicks Score: 16  Functional Limitation: Mobility: Walking and moving around  Mobility: Walking and Moving Around Current Status (): At least 40 percent but less than 60 percent impaired, limited or restricted  Mobility: Walking and Moving Around Goal Status (): At least 1 percent but less than 20 percent impaired, limited or restricted      Divina Vidal, PT  11/5/2018

## 2018-11-05 NOTE — NURSING NOTE
Patient has two trauma wounds. Right medial leg. Wound measures 1.2 x 1.5 x 0.1 cm Good granulation in wound base. Left shin wound was sutured 10- and has since reopened. It measures 6.7 x 2 x 0.1 cm Large amount of yellow slough and eschar in wound bed , very little granulation. Small amount of serosanguinous drainage from both wounds. Needs offloading, wound care and protection. All POA Patient's skin is paper thin and she has edema in both lower extremities.

## 2018-11-06 NOTE — THERAPY TREATMENT NOTE
Acute Care - Physical Therapy Treatment Note  [facility]     Patient Name: Tiffanie Arroyo  : 9/10/1926  MRN: [id number]  Today's Date: 2018  Onset of Illness/Injury or Date of Surgery: 18  Date of Referral to PT: 18  Referring Physician: LINNEA Alonso MD    Admit Date: 11/3/2018    Visit Dx:    ICD-10-CM ICD-9-CM   1. Acute cystitis with hematuria N30.01 595.0   2. Dehydration E86.0 276.51   3. MOE (acute kidney injury) (CMS/HCC) N17.9 584.9   4. Weakness R53.1 780.79   5. Impaired functional mobility, balance, gait, and endurance Z74.09 V49.89     Patient Active Problem List   Diagnosis   • Acute congestive heart failure (CMS/HCC)   • Age-related physical debility   • Amblyopia   • Artificial lens present   • S/P CABG (coronary artery bypass graft)   • Degenerative joint disease involving multiple joints   • Dehydration   • Depressive disorder   • Dyslipidemia   • Ecchymosis   • Generalized anxiety disorder   • Glaucoma   • Heart valve disorder   • Malignant neoplasm of skin   • Nausea   • Nonrheumatic mitral valve insufficiency   • Nonrheumatic tricuspid (valve) stenosis   • Nuclear senile cataract   • Osteoporosis   • Secondary pulmonary hypertension   • Peripheral edema   • Primary open angle glaucoma   • Repeated falls   • Restless legs   • Subdural hematoma (CMS/HCC)   • Atrial flutter (CMS/HCC)   • Cataract   • Pseudophakia   • Rectal bleed   • Chronic midline low back pain without sciatica   • Blood in stool   • Primary open angle glaucoma of both eyes, moderate stage   • Mixed hyperlipidemia   • Primary open angle glaucoma of both eyes, severe stage   • Chronic pain of left knee   • Fall   • Hypothyroidism   • Closed fracture of acromial end of right clavicle   • Hypertension   • Neck pain   • Acute renal insufficiency   • Hypocalcemia   • Acute cystitis with hematuria   • Diarrhea       Therapy Treatment          Rehabilitation Treatment Summary     Row Name 18 1410              Treatment Time/Intention    Discipline physical therapy assistant  -TW      Document Type therapy note (daily note)  -TW      Subjective Information complains of;fatigue   Main concern was of her son and where he was.  -TW      Mode of Treatment physical therapy;individual therapy  -TW      Patient/Family Observations No family present throughout tx this date.  -TW      Patient Effort adequate  -TW      Comment Pt was very concerned with her son and his whereabouts. Pt needed frequent redirection to maintain task.  -TW2      Existing Precautions/Restrictions fall  -TW      Recorded by [TW] Gabriel Geiger, WYATT 11/06/18 1451  [TW2] Gabriel Geiger PTA 11/06/18 1457      Row Name 11/06/18 1410             Vital Signs    Pre Systolic BP Rehab 118  -TW      Pre Treatment Diastolic BP 60  -TW      Pretreatment Heart Rate (beats/min) 70  -TW      Posttreatment Heart Rate (beats/min) 74  -TW      Pre SpO2 (%) 98  -TW      O2 Delivery Pre Treatment room air  -TW      Post SpO2 (%) 97  -TW      Pre Patient Position Supine  -TW      Intra Patient Position Sitting  -TW      Post Patient Position Supine  -TW      Recorded by [TW] Gabriel Geiger PTA 11/06/18 1457      Row Name 11/06/18 1410             Cognitive Assessment/Intervention- PT/OT    Affect/Mental Status (Cognitive) confused  -TW      Orientation Status (Cognition) oriented to;person  -TW      Follows Commands (Cognition) follows one step commands  -TW      Cognitive Function (Cognitive) memory deficit  -TW      Memory Deficit (Cognitive) mild deficit;moderate deficit  -TW      Safety Deficit (Cognitive) moderate deficit  -TW      Personal Safety Interventions fall prevention program maintained;nonskid shoes/slippers when out of bed  -TW      Recorded by [TW] Gabriel Geiger PTA 11/06/18 1457      Row Name 11/06/18 1410             Bed Mobility Assessment/Treatment    Rolling Left [place] (Bed Mobility) minimum assist (75% patient effort)   -TW      Rolling Right [place] (Bed Mobility) minimum assist (75% patient effort)  -TW      Supine-Sit [place] (Bed Mobility) minimum assist (75% patient effort)  -TW      Sit-Supine [place] (Bed Mobility) minimum assist (75% patient effort)  -TW      Bed Mobility, Safety Issues decreased use of arms for pushing/pulling;decreased use of legs for bridging/pushing  -TW      Assistive Device (Bed Mobility) bed rails;head of bed elevated  -TW      Comment (Bed Mobility) Pt sat EOB x 8 minutes but continuously would ask questions reguarding her son and where he was.   -TW      Recorded by [TW] Gabriel Geiger, PTA 11/06/18 1457      Row Name 11/06/18 1410             Sit-Stand Transfer    Sit-Stand [place] (Transfers) not tested  -TW      Recorded by [TW] Gabriel Geiger, PTA 11/06/18 1457      Row Name 11/06/18 1410             Gait/Stairs Assessment/Training    [place] Level (Gait) not tested  -TW      Recorded by [TW] Gabriel Geiger, PTA 11/06/18 1457      Row Name 11/06/18 1410             Positioning and Restraints    Pre-Treatment Position in bed  -TW      Post Treatment Position bed  -TW      In Bed supine;call light within reach;encouraged to call for assist;exit alarm on  -TW      Recorded by [TW] Gabriel Geiger, PTA 11/06/18 1457      Row Name                Wound 11/05/18 0904 Left midline laceration    Wound - Properties Group Date first assessed: 11/05/18 [ND] Time first assessed: 0904 [ND] Present On Admission : yes [ND] Side: Left [ND] Orientation: midline [ND] Type: laceration [ND] Recorded by:  [ND] Alva Muir RN 11/05/18 1414    Row Name                Wound 11/05/18 0904 Right medial leg skin tear    Wound - Properties Group Date first assessed: 11/05/18 [ND] Time first assessed: 0904 [ND] Present On Admission : yes [ND] Side: Right [ND] Orientation: medial [ND] Location: leg [ND] Type: skin tear [ND] Recorded by:  [ND] Alva Muir RN 11/05/18  1416    Row Name 11/06/18 1410             Outcome Summary/Treatment Plan (PT)    Daily Summary of Progress (PT) progress towards functional goals is fair  -TW      Barriers to Overall Progress (PT) Confused state  -TW      Plan for Continued Treatment (PT) Cont  -TW      Anticipated Discharge Disposition (PT) home with 24/7 care;home with home health  -TW      Recorded by [TW] Gabriel Geiger, PTA 11/06/18 1457        User Key  (r) = Recorded By, (t) = Taken By, (c) = Cosigned By    Initials Name Effective Dates Discipline    ND Alva Muir RN 10/17/16 -  Nurse    TW Gabriel Geiger, PTA 03/07/18 -  PT          Wound 11/05/18 0904 Left midline laceration (Active)   Dressing Appearance dry;intact 11/5/2018  8:15 PM   Closure Open to air 11/5/2018  8:15 PM   Base pink;red/granulating;slough 11/6/2018  8:00 AM   Care, Wound cleansed with;antimicrobial agent applied 11/5/2018  6:27 PM   Dressing Care, Wound dressing changed 11/6/2018  8:00 AM       Wound 11/05/18 0904 Right medial leg skin tear (Active)   Dressing Appearance open to air 11/6/2018  8:00 AM   Closure Open to air 11/6/2018  8:00 AM   Base red/granulating 11/6/2018  8:00 AM   Care, Wound cleansed with;antimicrobial agent applied 11/5/2018  6:27 PM   Dressing Care, Wound dressing removed 11/6/2018  8:00 AM               PT Rehab Goals     Row Name 11/06/18 1410             Bed Mobility Goal 1 (PT)    Activity/Assistive Device (Bed Mobility Goal 1, PT) rolling to left;rolling to right;scooting;sit to supine;supine to sit  -TW      [place] Level/Cues Needed (Bed Mobility Goal 1, PT) conditional independence  -TW      Time Frame (Bed Mobility Goal 1, PT) 3 days  -TW      Progress/Outcomes (Bed Mobility Goal 1, PT) goal not met  -TW         Transfer Goal 1 (PT)    Activity/Assistive Device (Transfer Goal 1, PT) bed-to-chair/chair-to-bed;sit-to-stand/stand-to-sit;walker, rolling  -TW      [place] Level/Cues Needed (Transfer Goal 1, PT)  conditional independence  -TW      Time Frame (Transfer Goal 1, PT) 3 days  -TW      Progress/Outcome (Transfer Goal 1, PT) goal not met  -TW         Gait Training Goal 1 (PT)    Activity/Assistive Device (Gait Training Goal 1, PT) gait (walking locomotion);assistive device use;decrease fall risk;increase endurance/gait distance;walker, rolling  -TW      [place] Level (Gait Training Goal 1, PT) conditional independence  -TW      Distance (Gait Goal 1, PT) 25 ft or more  -TW      Time Frame (Gait Training Goal 1, PT) 5 days  -TW      Progress/Outcome (Gait Training Goal 1, PT) goal not met  -TW        User Key  (r) = Recorded By, (t) = Taken By, (c) = Cosigned By    Initials Name Provider Type Discipline    TW Gabriel Geiger, PTA Physical Therapy Assistant PT          Physical Therapy Education     Title: PT OT SLP Therapies (Active)     Topic: Physical Therapy (Active)     Point: Mobility training (Done)    Learning Progress Summary     Learner Status Readiness Method Response Comment Documented by    Patient Done Acceptance E VU Role of PT, POC, use of gait belt, d/c recommendations, having staff member with pt when OOB KW 11/05/18 1506                      User Key     Initials Effective Dates Name Provider Type Discipline     07/23/18 -  Divina Vidal, PT Physical Therapist PT                    PT Recommendation and Plan  Anticipated Discharge Disposition (PT): home with 24/7 care, home with home health  Outcome Summary/Treatment Plan (PT)  Daily Summary of Progress (PT): progress towards functional goals is fair  Barriers to Overall Progress (PT): Confused state  Plan for Continued Treatment (PT): Cont  Anticipated Discharge Disposition (PT): home with 24/7 care, home with home health  Plan of Care Reviewed With: patient  Progress: no change  Outcome Summary: Pt very confused and fixated on her sons whereabouts are today. Pt agreed to sit EOB but once t/f completed pt cont to request her son. PTA  did not feel it was safe for pt to attempt to stand at this time due to concentration not at safe level. Pt sat EOB 8 minutes before t/f  to supine.          Outcome Measures     Row Name 11/06/18 1410 11/05/18 1354          How much help from another person do you currently need...    Turning from your back to your side while in flat bed without using bedrails? 3  -TW 3  -KW     Moving from lying on back to sitting on the side of a flat bed without bedrails? 3  -TW 3  -KW     Moving to and from a bed to a chair (including a wheelchair)? 3  -TW 3  -KW     Standing up from a chair using your arms (e.g., wheelchair, bedside chair)? 3  -TW 3  -KW     Climbing 3-5 steps with a railing? 2  -TW 2  -KW     To walk in hospital room? 2  -TW 2  -KW     AM-PAC 6 Clicks Score 16  -TW 16  -KW        Functional Assessment    Outcome Measure Options AM-PAC 6 Clicks Basic Mobility (PT)  -TW AM-PAC 6 Clicks Basic Mobility (PT)  -KW       User Key  (r) = Recorded By, (t) = Taken By, (c) = Cosigned By    Initials Name Provider Type    TW Gabriel Geiger PTA Physical Therapy Assistant    Divina Leon, PT Physical Therapist           Time Calculation:         PT Charges     Row Name 11/06/18 1500             Time Calculation    Start Time 1410  -TW      Stop Time 1434  -TW      Time Calculation (min) 24 min  -TW      PT Received On 11/06/18  -TW      PT Goal Re-Cert Due Date 11/18/18  -TW         Time Calculation- PT    Total Timed Code Minutes- PT 24 minute(s)  -TW        User Key  (r) = Recorded By, (t) = Taken By, (c) = Cosigned By    Initials Name Provider Type    TW Gabriel Geiger PTA Physical Therapy Assistant        Therapy Suggested Charges     Code   Minutes Charges    None           Therapy Charges for Today     Code Description Service Date Service Provider Modifiers Qty    [id number]  PT THERAPEUTIC ACT EA 15 MIN 11/6/2018 Gabriel Geiger PTA GP 2          PT G-Codes  PT Professional Judgement Used?:  Yes  Outcome Measure Options: AM-PAC 6 Clicks Basic Mobility (PT)  AM-PAC 6 Clicks Score: 16  Functional Limitation: Mobility: Walking and moving around  Mobility: Walking and Moving Around Current Status (): At least 40 percent but less than 60 percent impaired, limited or restricted  Mobility: Walking and Moving Around Goal Status (): At least 1 percent but less than 20 percent impaired, limited or restricted    Gabriel Geiger, PTA  11/6/2018

## 2018-11-06 NOTE — PLAN OF CARE
Problem: Patient Care Overview  Goal: Plan of Care Review  Outcome: Ongoing (interventions implemented as appropriate)   11/06/18 0456   Coping/Psychosocial   Plan of Care Reviewed With patient   Plan of Care Review   Progress improving   OTHER   Outcome Summary pt rested well during the night     Goal: Individualization and Mutuality  Outcome: Ongoing (interventions implemented as appropriate)    Goal: Discharge Needs Assessment  Outcome: Ongoing (interventions implemented as appropriate)      Problem: Fall Risk (Adult)  Goal: Absence of Fall  Outcome: Ongoing (interventions implemented as appropriate)      Problem: Skin Injury Risk (Adult)  Goal: Skin Health and Integrity  Outcome: Ongoing (interventions implemented as appropriate)      Problem: Infection, Risk/Actual (Adult)  Goal: Identify Related Risk Factors and Signs and Symptoms  Outcome: Outcome(s) achieved Date Met: 11/06/18    Goal: Infection Prevention/Resolution  Outcome: Ongoing (interventions implemented as appropriate)

## 2018-11-06 NOTE — PROGRESS NOTES
[facility] Medicine Services  INPATIENT PROGRESS NOTE    Length of Stay: 0  Date of Admission: 11/3/2018  Primary Care Physician: Abilio Bhardwaj MD    Subjective   Chief Complaint: nausea  HPI:    Patient seen.  She states that her nausea and vomiting and diarrhea has resolved.  She has normal appetite.  She feels improved generally.  She denies fevers or chills.      Review of Systems   Constitutional: Negative for appetite change, chills, fatigue and fever.   HENT: Negative for congestion and sore throat.    Eyes: Negative for pain and redness.   Respiratory: Negative for cough, chest tightness, shortness of breath and wheezing.    Cardiovascular: Negative for chest pain, palpitations and leg swelling.   Gastrointestinal: Negative for abdominal pain, constipation, diarrhea, nausea and vomiting.   Genitourinary: Negative for dysuria and hematuria.   Musculoskeletal: Negative for arthralgias, joint swelling and neck pain.        Wound on legs   Skin: Negative for color change and rash.   Neurological: Negative for dizziness, syncope, light-headedness and headaches.   Hematological: Negative for adenopathy.   Psychiatric/Behavioral: Negative for agitation and confusion. The patient is not nervous/anxious.        Objective    Temp:  [97.4 °F (36.3 °C)-98.9 °F (37.2 °C)] 97.7 °F (36.5 °C)  Heart Rate:  [66-88] 82  Resp:  [18] 18  BP: (114-146)/(56-66) 146/66     Physical Exam   Constitutional: She is oriented to person, place, and time. She appears well-developed and well-nourished. No distress.   HENT:   Head: Normocephalic and atraumatic.   Mouth/Throat: Oropharynx is clear and moist.   Eyes: Pupils are equal, round, and reactive to light. Conjunctivae and EOM are normal.   Neck: Normal range of motion. Neck supple. No JVD present. No tracheal deviation present. No thyromegaly present.   Cardiovascular: Normal rate, regular rhythm, normal heart sounds and intact distal  pulses.  Exam reveals no gallop and no friction rub.    No murmur heard.  Pulmonary/Chest: Effort normal and breath sounds normal. No stridor. No respiratory distress. She has no wheezes. She has no rales. She exhibits no tenderness.   Abdominal: Soft. Bowel sounds are normal. She exhibits no distension and no mass. There is no tenderness. There is no rebound and no guarding. No hernia.   Musculoskeletal: Normal range of motion. She exhibits no edema, tenderness or deformity.   Superficial ulcer on posterior right calf and left medial shin region.   Lymphadenopathy:     She has no cervical adenopathy.   Neurological: She is alert and oriented to person, place, and time. No cranial nerve deficit or sensory deficit. She exhibits normal muscle tone. Coordination normal.   Skin: Skin is warm and dry. Capillary refill takes less than 2 seconds. No rash noted. She is not diaphoretic. No erythema. No pallor.   Psychiatric: She has a normal mood and affect. Her behavior is normal. Judgment and thought content normal.   Vitals reviewed.      Results Review:  I have reviewed the labs, radiology results, and diagnostic studies.    Laboratory Data:   Lab Results (last 24 hours)     Procedure Component Value Units Date/Time    CBC & Differential [[id number]] Collected:  11/05/18 0544    Specimen:  Blood Updated:  11/05/18 0743    Narrative:       The following orders were created for panel order CBC & Differential.  Procedure                               Abnormality         Status                     ---------                               -----------         ------                     Scan Slide[[id number]]                   Normal              Final result               CBC Auto Differential[[id number]]        Abnormal            Final result                 Please view results for these tests on the individual orders.    Scan Slide [[id number]]  (Normal) Collected:  11/05/18 0544    Specimen:  Blood Updated:  11/05/18 0743      RBC Morphology Normal     WBC Morphology Normal     Platelet Morphology Normal    CBC Auto Differential [[id number]]  (Abnormal) Collected:  11/05/18 0544    Specimen:  Blood Updated:  11/05/18 0709     WBC 12.14 (H) 10*3/mm3      RBC 3.53 (L) 10*6/mm3      Hemoglobin 11.7 (L) g/dL      Hematocrit 34.2 (L) %      MCV 96.9 fL      MCH 33.1 pg      MCHC 34.2 g/dL      RDW 16.8 (H) %      RDW-SD 58.6 (H) fl      MPV 9.4 fL      Platelets 224 10*3/mm3      Neutrophil % 64.8 %      Lymphocyte % 19.6 %      Monocyte % 7.1 %      Eosinophil % 1.9 %      Basophil % 0.6 %      Immature Grans % 6.0 (H) %      Neutrophils, Absolute 7.87 10*3/mm3      Lymphocytes, Absolute 2.38 10*3/mm3      Monocytes, Absolute 0.86 10*3/mm3      Eosinophils, Absolute 0.23 10*3/mm3      Basophils, Absolute 0.07 10*3/mm3      Immature Grans, Absolute 0.73 (H) 10*3/mm3     Comprehensive Metabolic Panel [[id number]]  (Abnormal) Collected:  11/05/18 0544    Specimen:  Blood Updated:  11/05/18 0646     Glucose 78 mg/dL      BUN 27 (H) mg/dL      Creatinine 0.93 mg/dL      Sodium 135 (L) mmol/L      Potassium 3.7 mmol/L      Chloride 113 (H) mmol/L      CO2 13.0 (L) mmol/L      Calcium 7.0 (L) mg/dL      Total Protein 5.1 (L) g/dL      Albumin 2.50 (L) g/dL      ALT (SGPT) 26 U/L      AST (SGOT) 19 U/L      Alkaline Phosphatase 60 U/L      Total Bilirubin 0.4 mg/dL      eGFR Non African Amer 56 mL/min/1.73      Globulin 2.6 gm/dL      A/G Ratio 1.0 (L) g/dL      BUN/Creatinine Ratio 29.0 (H)     Anion Gap 9.0 mmol/L     Narrative:       The MDRD GFR formula is only valid for adults with stable renal function between ages 18 and 70.    Urine Culture - Urine, [[id number]]  (Abnormal) Collected:  11/03/18 1814    Specimen:  Urine from Urine, Clean Catch Updated:  11/05/18 0611     Urine Culture >100,000 CFU/mL Gram Negative Bacilli (A)          Culture Data:   No results found for: BLOODCX  Urine Culture   Date Value Ref Range Status   11/03/2018 >100,000  CFU/mL Gram Negative Bacilli (A)  Preliminary     No results found for: RESPCX  No results found for: WOUNDCX  No results found for: STOOLCX  No components found for: BODYFLD    Radiology Data:   Imaging Results (last 24 hours)     ** No results found for the last 24 hours. **          I have reviewed the patient's current medications.     Assessment/Plan     Active Hospital Problems    Diagnosis   • **Dehydration   • Acute cystitis with hematuria   • Diarrhea   • Hypothyroidism   • Depressive disorder     1.  Acute cystitis with hematuria  - Patient's hematuria has resolved.  Urine culture shows 100,000 colony-forming units of gram-negative bacilli.  Sensitivities and identification pending  - Continue Levaquin and switched to by mouth  - Can discontinue IV fluids and encourage liberal oral fluid intake as creatinine has normalized.  -    2.  Diarrhea   - Patient's diarrhea has resolved.  No samples able to be collected for gastrointestinal panel PCR at this time.    - We'll continue with by mouth Levaquin   - If patient continues to be improved and discharged home where she has 24-hour care tomorrow     3. Lower leg superficial wounds   - Patient had superficial ulcers on right posterior calf and left medial shin region.  Will have wound care consult for dressing as patient was to see wound care as outpatient and this does day.  Awaiting stool culture  Continue levaquin for UTI  zofran for nausea    DVT prophylaxis with sequential compression devices.  Patient's CODE STATUS is DNR/DNI.    Bernardo Alonso MD   11/05/18   7:21 PM

## 2018-11-06 NOTE — DISCHARGE SUMMARY
[facility] Medicine Services  DISCHARGE SUMMARY       Date of Admission: 11/3/2018  Date of Discharge:  11/6/2018  Primary Care Physician: Abilio Bhardwaj MD    Presenting Problem/History of Present Illness:  Dehydration [E86.0]  Weakness [R53.1]  MOE (acute kidney injury) (CMS/[facility]) [N17.9]  Acute cystitis with hematuria [N30.01]     Final Discharge Diagnoses:  Active Hospital Problems    Diagnosis   • **Dehydration   • Acute cystitis with hematuria   • Diarrhea   • Hypothyroidism   • Depressive disorder       Consults:   Consults     Date and Time Order Name Status Description    11/3/2018 1936 Hospitalist (on-call MD unless specified)            Procedures Performed:  None                Pertinent Test Results:   Urinalysis With Culture If Indicated - Urine, Clean Catch [[id number]] (Abnormal) Collected: 11/03/18 1814   Lab Status: Final result Specimen: Urine from Urine, Clean Catch Updated: 11/03/18 1826    Color, UA Yellow    Appearance, UA Cloudy (A)    pH, UA <=5.0    Specific Gravity, UA 1.026    Glucose, UA Negative    Ketones, UA Negative    Bilirubin, UA Negative    Blood, UA Trace (A)    Protein, UA Negative    Leuk Esterase, UA Moderate (2+) (A)    Nitrite, UA Positive (A)    Urobilinogen, UA 0.2 E.U./dL   Urinalysis, Microscopic Only - Urine, Clean Catch [[id number]] (Abnormal) Collected: 11/03/18 1814   Lab Status: Final result Specimen: Urine from Urine, Clean Catch Updated: 11/03/18 1826    RBC, UA 13-20 (A) /HPF     WBC, UA 6-12 (A) /HPF     Bacteria, UA 2+ (A) /HPF     Squamous Epithelial Cells, UA None Seen /HPF     Hyaline Casts, UA None Seen /LPF          Urine Culture - Urine, [[id number]] (Abnormal)  Collected: 11/03/18 1814   Lab Status: Final result Specimen: Urine from Urine, Clean Catch Updated: 11/06/18 0637    Urine Culture >100,000 CFU/mL Klebsiella pneumoniae (A)   Susceptibility      Klebsiella pneumoniae     FORTINO    Amoxicillin + Clavulanate  "<=8/4 ug/ml\"><=8/4 ug/ml Susceptible    Ampicillin >16 ug/ml Resistant    Ampicillin + Sulbactam 16/8 ug/ml Intermediate    Cefazolin <=8 ug/ml\"><=8 ug/ml Susceptible    Cefepime <=8 ug/ml\"><=8 ug/ml Susceptible    Cefoxitin >16 ug/ml Resistant    Ceftazidime <=1 ug/ml\"><=1 ug/ml Susceptible    Ceftriaxone <=8 ug/ml\"><=8 ug/ml Susceptible    Cefuroxime sodium >16 ug/ml Resistant    Levofloxacin >4 ug/ml Resistant    Nitrofurantoin >64 ug/ml Resistant    Piperacillin + Tazobactam <=16 ug/ml\"><=16 ug/ml Susceptible    Tetracycline >8 ug/ml Resistant    Trimethoprim + Sulfamethoxazole <=2/38 ug/ml\"><=2/38 ug/ml Susceptible                Giardia / Cryptosporidium Screen - Stool, Per Rectum [[id number]]    Lab Status: No result Specimen: Stool from Per Rectum    CBC Auto Differential [[id number]] (Abnormal) Collected: 11/03/18 1523   Lab Status: Final result Specimen: Blood from Arm, Right Updated: 11/03/18 1533    WBC 15.29 (H) 10*3/mm3     RBC 4.01 10*6/mm3     Hemoglobin 13.4 g/dL     Hematocrit 38.5 %     MCV 96.0 fL     MCH 33.4 pg     MCHC 34.8 g/dL     RDW 16.1 (H) %     RDW-SD 55.7 (H) fl     MPV 9.7 fL     Platelets 238 10*3/mm3     Neutrophil % 76.3 %     Lymphocyte % 13.8 %     Monocyte % 6.4 %     Eosinophil % 0.4 %     Basophil % 0.2 %     Immature Grans % 2.9 (H) %     Neutrophils, Absolute 11.66 (H) 10*3/mm3     Lymphocytes, Absolute 2.11 10*3/mm3     Monocytes, Absolute 0.98 (H) 10*3/mm3     Eosinophils, Absolute 0.06 10*3/mm3     Basophils, Absolute 0.03 10*3/mm3     Immature Grans, Absolute 0.45 (H) 10*3/mm3    TSH [[id number]] (Normal) Collected: 11/03/18 1523   Lab Status: Final result Specimen: Blood from Arm, Right Updated: 11/03/18 2230    TSH 0.850 mIU/mL    Comprehensive Metabolic Panel [[id number]] (Abnormal) Collected: 11/03/18 1508   Lab Status: Final result Specimen: Blood Updated: 11/03/18 1532    Glucose 92 mg/dL     BUN 45 (H) mg/dL     Creatinine 1.37 (H) mg/dL     Sodium 136 (L) mmol/L     " "Potassium 4.3 mmol/L     Chloride 110 mmol/L     CO2 13.0 (L) mmol/L     Calcium 9.4 mg/dL     Total Protein 7.0 g/dL     Albumin 3.50 g/dL     ALT (SGPT) 18 U/L     AST (SGOT) 23 U/L     Alkaline Phosphatase 94 U/L     Total Bilirubin 0.6 mg/dL     eGFR Non African Amer 36 (L) mL/min/1.73     Globulin 3.5 gm/dL     A/G Ratio 1.0 (L) g/dL     BUN/Creatinine Ratio 32.8 (H)    Anion Gap 13.0 mmol/L    Narrative:           Chief Complaint on Day of Discharge: None    Hospital Course:  The patient is a 89 y.o. female who presented to [facility] with complaints of diarrhea and rectal pain.    Patient stated that approximately 2 weeks ago she started to have episodes of diarrhea, reduced by mouth intake, and generalized malaise and fatigue.  Patient stated that she had 1 episode of diarrhea a day at approximately 4-5 am every day over the two weeks prior to admission. Patient admitted to having loss of appetite although she did continue to drink adequate fluids.  She endorsed some chills but denied fever.  Patient had recently been on clindamycin.    She was admitted and treated for dehydration and acute cystitis.  She received IV Levaquin and IV fluids.  She also received IV antiemetics.  Her diarrhea resolved while she was on admission.  She was giving fosfomycin as her urine culture grew Klebsiella that was multidrug resistant.  She had small leg wounds for which she was being seen outpatient by wound care consult wound care was invited while she was on admission to dress her wound.  She was discharged to home with physical therapy for her physical deconditioning.    Condition on Discharge:  Stable    Physical Exam on Discharge:  /56 (BP Location: Right arm, Patient Position: Sitting)   Pulse 65   Temp 97.6 °F (36.4 °C) (Oral)   Resp 18   Ht 165.1 cm (65\")   Wt 48 kg (105 lb 12.8 oz)   SpO2 98%   BMI 17.61 kg/m²      Physical Exam  Constitutional: She is oriented to person, place, and " time. She appears well-developed and well-nourished. No distress.   HENT:   Head: Normocephalic and atraumatic.   Mouth/Throat: Oropharynx is clear and moist.   Eyes: Pupils are equal, round, and reactive to light. Conjunctivae and EOM are normal.   Neck: Normal range of motion. Neck supple. No JVD present. No tracheal deviation present. No thyromegaly present.   Cardiovascular: Normal rate, regular rhythm, normal heart sounds and intact distal pulses.  Exam reveals no gallop and no friction rub.    No murmur heard.  Pulmonary/Chest: Effort normal and breath sounds normal. No stridor. No respiratory distress. She has no wheezes. She has no rales. She exhibits no tenderness.   Abdominal: Soft. Bowel sounds are normal. She exhibits no distension and no mass. There is no tenderness. There is no rebound and no guarding. No hernia.   Musculoskeletal: Normal range of motion. She exhibits no edema, tenderness or deformity.   Superficial ulcer on posterior right calf and left medial shin region.   Lymphadenopathy:     She has no cervical adenopathy.   Neurological: She is alert and oriented to person, place, and time. No cranial nerve deficit or sensory deficit. She exhibits normal muscle tone. Coordination normal.   Skin: Skin is warm and dry. Capillary refill takes less than 2 seconds. No rash noted. She is not diaphoretic. No erythema. No pallor.   Psychiatric: She has a normal mood and affect. Her behavior is normal. Judgment and thought content normal.     Discharge Disposition:      Discharge Medications:     Discharge Medications      New Medications      Instructions Start Date   magic butt ointment   1 each, Topical, 2 Times Daily PRN      ondansetron 4 MG tablet  Commonly known as:  ZOFRAN   4 mg, Oral, Every 8 Hours PRN         Changes to Medications      Instructions Start Date   aspirin 81 MG EC tablet  What changed:  when to take this   81 mg, Oral, Daily      rOPINIRole 0.5 MG tablet  Commonly known as:   REQUIP  What changed:  See the new instructions.   TAKE 1 TABLET  EVERY EVENING         Continue These Medications      Instructions Start Date   ALPHAGAN P 0.1 % solution ophthalmic solution  Generic drug:  brimonidine   INSTILL 1 DROP IN BOTH EYES THREE TIMES DAILY      calcium carbonate 500 MG chewable tablet  Commonly known as:  TUMS   1 tablet, Oral, 2 Times Daily      carvedilol 6.25 MG tablet  Commonly known as:  COREG   6.25 mg, Oral, 2 Times Daily With Meals      Cholecalciferol 5000 units tablet   5,000 Units, Oral, Daily      furosemide 20 MG tablet  Commonly known as:  LASIX   20 mg, Oral, Daily      latanoprost 0.005 % ophthalmic solution  Commonly known as:  XALATAN   1 drop, Both Eyes, Nightly      levothyroxine 112 MCG tablet  Commonly known as:  SYNTHROID, LEVOTHROID   TAKE 1 TAB DAILY FOR THYROID, ON EMPTY STOMACH -- EITHER 1 HOUR BEFORE EATING OR 2 HOURS AFTER      megestrol 40 MG/ML suspension  Commonly known as:  MEGACE ORAL   400 mg, Oral, 2 Times Daily      mirtazapine 15 MG tablet  Commonly known as:  REMERON   15 mg, Oral, Nightly, Stop zoloft         Stop These Medications    clindamycin 300 MG capsule  Commonly known as:  CLEOCIN     docusate sodium 100 MG capsule  Commonly known as:  DOK            Discharge Diet:  regular diet    Activity at Discharge:  self-limited activity    Discharge Care Plan/Instructions:     1.  Follow up with your primary care provider within one week of discharge.    Follow-up Appointments:   Future Appointments  Date Time Provider Department Center   12/3/2018 1:30 PM Abilio Bhardwaj MD [facility] FM MAD4 None   12/20/2018 1:30 PM Jose Chao MD [facility] CD MAD None   2/15/2019 10:00 AM  ALEX OP INFU PROCEDURE CHAIR  [facility] OPI MAD       Test Results Pending at Discharge:  Order Current Status    [place] Draw In process    Rotavirus Antigen, Stool - Stool, Per Rectum In process    Stool Culture - Stool, Per Rectum In process          Bernardo Alonso,  MD  11/06/18  2:41 PM     Time: A total of 34 minutes was spent evaluating the patient and planning her discharge.

## 2018-11-06 NOTE — PLAN OF CARE
Problem: Patient Care Overview  Goal: Plan of Care Review  Outcome: Ongoing (interventions implemented as appropriate)   11/06/18 1440   Coping/Psychosocial   Plan of Care Reviewed With patient   Plan of Care Review   Progress improving   OTHER   Outcome Summary possible DC home today; VS stable; no pain at this time     Goal: Individualization and Mutuality  Outcome: Ongoing (interventions implemented as appropriate)    Goal: Discharge Needs Assessment  Outcome: Ongoing (interventions implemented as appropriate)    Goal: Interprofessional Rounds/Family Conf  Outcome: Ongoing (interventions implemented as appropriate)      Problem: Fall Risk (Adult)  Goal: Absence of Fall  Outcome: Ongoing (interventions implemented as appropriate)      Problem: Skin Injury Risk (Adult)  Goal: Skin Health and Integrity  Outcome: Ongoing (interventions implemented as appropriate)      Problem: Infection, Risk/Actual (Adult)  Goal: Infection Prevention/Resolution  Outcome: Ongoing (interventions implemented as appropriate)

## 2018-11-06 NOTE — PLAN OF CARE
Problem: Patient Care Overview  Goal: Plan of Care Review  Outcome: Ongoing (interventions implemented as appropriate)   11/06/18 1410   Coping/Psychosocial   Plan of Care Reviewed With patient   Plan of Care Review   Progress no change   OTHER   Outcome Summary Pt very confused and fixated on her sons whereabouts are today. Pt agreed to sit EOB but once t/f completed pt cont to request her son. PTA did not feel it was safe for pt to attempt to stand at this time due to concentration not at safe level. Pt sat EOB 8 minutes before t/f to supine.

## 2018-11-07 NOTE — OUTREACH NOTE
Prep Survey      Responses   Facility patient discharged from?  [place]   Is patient eligible?  Yes   Discharge diagnosis  Dehydration, weakness, MOE, acute cystitis with hematuria, diarrhea, hypothyroidism, depressive disorder   Does the patient have one of the following disease processes/diagnoses(primary or secondary)?  Other   Does the patient have Home health ordered?  Yes   What is the Home health agency?   [facility]   Is there a DME ordered?  No   What DME was ordered?  Pt has rolling walker at home.   Comments regarding appointments  See AVS   Prep survey completed?  Yes          Daiana Foote RN

## 2018-11-07 NOTE — THERAPY DISCHARGE NOTE
Acute Care - Physical Therapy Discharge Summary  [facility]       Patient Name: Tiffanie Arroyo  : 9/10/1926  MRN: [id number]    Today's Date: 2018  Onset of Illness/Injury or Date of Surgery: 18    Date of Referral to PT: 18  Referring Physician: LINNEA Alonso MD      Admit Date: 11/3/2018      PT Recommendation and Plan    Visit Dx:    ICD-10-CM ICD-9-CM   1. Acute cystitis with hematuria N30.01 595.0   2. Dehydration E86.0 276.51   3. MOE (acute kidney injury) (CMS/[facility]) N17.9 584.9   4. Weakness R53.1 780.79   5. Impaired functional mobility, balance, gait, and endurance Z74.09 V49.89             Outcome Measures     Row Name 18 1410 18 1354          How much help from another person do you currently need...    Turning from your back to your side while in flat bed without using bedrails? 3  -TW 3  -KW     Moving from lying on back to sitting on the side of a flat bed without bedrails? 3  -TW 3  -KW     Moving to and from a bed to a chair (including a wheelchair)? 3  -TW 3  -KW     Standing up from a chair using your arms (e.g., wheelchair, bedside chair)? 3  -TW 3  -KW     Climbing 3-5 steps with a railing? 2  -TW 2  -KW     To walk in hospital room? 2  -TW 2  -KW     AM-PAC 6 Clicks Score 16  -TW 16  -KW        Functional Assessment    Outcome Measure Options AM-PAC 6 Clicks Basic Mobility (PT)  -TW AM-PAC 6 Clicks Basic Mobility (PT)  -KW       User Key  (r) = Recorded By, (t) = Taken By, (c) = Cosigned By    Initials Name Provider Type    Gabriel Vickers PTA Physical Therapy Assistant    KW Divina Vidal, PT Physical Therapist            Therapy Suggested Charges     Code   Minutes Charges    None                     PT Discharge Summary  Anticipated Discharge Disposition (PT): home with 24/7 care, home with home health  Reason for Discharge: Discharge from facility, Per MD order  Outcomes Achieved: Unable to make functional progress toward goals at this  time  Discharge Destination: Home with home health      Divina Vidal, PT   11/7/2018

## 2018-11-12 NOTE — OUTREACH NOTE
Medical Week 1 Survey      Responses   Facility patient discharged from?  [place]   Does the patient have one of the following disease processes/diagnoses(primary or secondary)?  Other   Is there a successful TCM telephone encounter documented?  No   Week 1 attempt successful?  Yes [Getting ready for MD appt]   Rescheduled  Rescheduled-pt requested          Camila Wiggins RN

## 2018-11-12 NOTE — PROGRESS NOTES
Subjective   Tiffanie Arroyo is a 89 y.o. female.     Fatigue   This is a chronic problem. The current episode started more than 1 year ago. The problem occurs constantly. The problem has been gradually worsening. Associated symptoms include anorexia and fatigue. Pertinent negatives include no chest pain.   Hypertension   The current episode started more than 1 year ago. The problem has been waxing and waning since onset. The problem is uncontrolled. Associated symptoms include peripheral edema. Pertinent negatives include no chest pain, palpitations or shortness of breath.   Dementia   This is a new problem. The current episode started more than 1 month ago. The problem occurs constantly. The problem has been gradually worsening. Associated symptoms include anorexia and fatigue. Pertinent negatives include no chest pain.   Urinary Tract Infection    This is a recurrent problem. The current episode started more than 1 month ago. The problem occurs intermittently. The problem has been resolved.        The following portions of the patient's history were reviewed and updated as appropriate: allergies, current medications, past family history, past medical history, past social history, past surgical history and problem list.    Review of Systems   Constitutional: Positive for fatigue.   Respiratory: Negative for shortness of breath.    Cardiovascular: Negative for chest pain and palpitations.   Gastrointestinal: Positive for anorexia.       Objective   Physical Exam   Constitutional: She is oriented to person, place, and time. She appears well-developed and well-nourished. No distress.   HENT:   Head: Normocephalic and atraumatic.   Cardiovascular: Normal rate, regular rhythm and normal heart sounds. Exam reveals no gallop and no friction rub.   No murmur heard.  Pulmonary/Chest: Effort normal and breath sounds normal. No stridor. No respiratory distress. She exhibits no tenderness.   Abdominal: Soft. Bowel sounds are  normal. There is no tenderness.   Neurological: She is alert and oriented to person, place, and time. She displays no tremor. Coordination and gait abnormal.   Skin: Skin is warm and dry. She is not diaphoretic.   Psychiatric: She has a normal mood and affect. Her behavior is normal. Judgment and thought content normal. Cognition and memory are impaired (MMSE 13/30). She exhibits abnormal recent memory and abnormal remote memory.   Nursing note and vitals reviewed.      Assessment/Plan   Problems Addressed this Visit        Other    Dehydration - Primary    Relevant Orders    Comprehensive Metabolic Panel      Other Visit Diagnoses     Hypotension due to drugs        Dementia without behavioral disturbance, unspecified dementia type        Relevant Orders    Vitamin B12    T4, Free    CBC & Differential    Debility        Coronary artery disease involving native coronary artery of native heart without angina pectoris        Relevant Medications    carvedilol (COREG) 3.125 MG tablet            [facility] recheck.  Patient is wheelchair-bound and has caregivers at home.  Her mental status has deteriorated and has her physical condition.  I had long discussion with her son today in the office about starting medications for dementia.  However, her biggest issue is weight loss at this time.  She continues have recurrent dehydration and urinary tract infection.  After discussion with her son.  We will not use medications for memory at this time.  We'll focus on comfort care home and nutrition.

## 2018-11-16 NOTE — OUTREACH NOTE
Medical Week 1 Survey      Responses   Facility patient discharged from?  [place]   Does the patient have one of the following disease processes/diagnoses(primary or secondary)?  Other   Is there a successful TCM telephone encounter documented?  No   Week 1 attempt successful?  Yes   Call start time  1033   Call end time  1038   Discharge diagnosis  Dehydration, weakness, MOE, acute cystitis with hematuria, diarrhea, hypothyroidism, depressive disorder   Is patient permission given to speak with other caregiver?  Yes   List who call center can speak with  Caregivers, has sitters 24/7   Meds reviewed with patient/caregiver?  Yes   Is the patient having any side effects they believe may be caused by any medication additions or changes?  No   Does the patient have all medications ordered at discharge?  Yes   Is the patient taking all medications as directed (includes completed medication regime)?  Yes   Comments regarding appointments  See AVS   Does the patient have a primary care provider?   Yes   Does the patient have an appointment with their PCP within 7 days of discharge?  Greater than 7 days   Comments regarding PCP  Dr. Bhardwaj 12/3   What is preventing the patient from scheduling follow up appointments within 7 days of discharge?  Earlier appointment not available   Has the patient kept scheduled appointments due by today?  N/A   What is the Home health agency?   [facility] [place]   Has home health visited the patient within 72 hours of discharge?  Yes   What DME was ordered?  Pt has rolling walker at home.   Has all DME been delivered?  Yes   Psychosocial issues?  Yes   Psychosocial comments  Confused most days   Did the patient receive a copy of their discharge instructions?  Yes   Nursing interventions  Reviewed instructions with patient   What is the patient's perception of their health status since discharge?  Same   Is the patient/caregiver able to teach back signs and symptoms related to disease process for when  to call PCP?  Yes   Is the patient/caregiver able to teach back signs and symptoms related to disease process for when to call 911?  Yes   Is the patient/caregiver able to teach back the hierarchy of who to call/visit for symptoms/problems? PCP, Specialist, Home health nurse, Urgent Care, ED, 911  Yes   Week 1 call completed?  Yes   Wrap up additional comments  She is staying in her wheelchair most of the time now, not using her walker. Requires sitters 24/7 which family has made arrangements for. Wound care seeing her at home.           Frida Arroyo, RN

## 2018-11-26 NOTE — OUTREACH NOTE
Medical Week 2 Survey      Responses   Facility patient discharged from?  [place]   Does the patient have one of the following disease processes/diagnoses(primary or secondary)?  Other   Week 2 attempt successful?  Yes   Call start time  1459   Discharge diagnosis  Dehydration, weakness, MOE, acute cystitis with hematuria, diarrhea, hypothyroidism, depressive disorder   Call end time  1503   List who call center can speak with  Caregivers, has sitters 24/7   Meds reviewed with patient/caregiver?  Yes   Is the patient taking all medications as directed (includes completed medication regime)?  Yes   Has the patient kept scheduled appointments due by today?  Yes   What is the Home health agency?   [facility]   Home health comments  HH still coming   What is the patient's perception of their health status since discharge?  Improving   Additional teach back comments  Talked with new sitter, pt in background. Pt is using walker to get around and W/C. Appetite is good. Has seen Dr since out of hospital. HH still coming   Week 2 Call Completed?  Yes          Afua Frank RN

## 2018-11-27 NOTE — PROGRESS NOTES
Currently not dehydrated.  Her calcium is getting low however.  Recommending calcium carbonate 600 mg twice a day.  She may not be able to take the pills but she can substitute the Viactiv 500 mg 3 times a day chewable

## 2018-11-27 NOTE — PROGRESS NOTES
Subjective   Tiffanie Arroyo is a 89 y.o. female.     Diarrhea    This is a new problem. The current episode started in the past 7 days. The problem occurs 2 to 4 times per day. The problem has been unchanged. The stool consistency is described as watery. The patient states that diarrhea awakens her from sleep. Pertinent negatives include no abdominal pain, bloating, chills, fever or weight loss. Nothing aggravates the symptoms. There are no known risk factors. She has tried nothing for the symptoms. The treatment provided no relief.        The following portions of the patient's history were reviewed and updated as appropriate: allergies, current medications, past family history, past medical history, past social history, past surgical history and problem list.    Review of Systems   Constitutional: Negative for chills, fever and weight loss.   Gastrointestinal: Positive for diarrhea. Negative for abdominal pain and bloating.       Objective   Physical Exam   Constitutional: She is oriented to person, place, and time. She appears well-developed and well-nourished. No distress.   HENT:   Head: Normocephalic and atraumatic.   Mouth/Throat: Oropharynx is clear and moist.   Cardiovascular: Normal rate and regular rhythm. Exam reveals no gallop and no friction rub.   No murmur heard.  Pulmonary/Chest: Effort normal and breath sounds normal. No stridor. No respiratory distress. She has no wheezes. She has no rales. She exhibits no tenderness.   Abdominal: Soft. Normal appearance. Bowel sounds are increased. There is no tenderness.   Neurological: She is alert and oriented to person, place, and time.   Skin: Skin is warm and dry. She is not diaphoretic.   Psychiatric: She has a normal mood and affect. Her behavior is normal. Judgment and thought content normal.   Nursing note and vitals reviewed.      Assessment/Plan   Tiffanie was seen today for urinary tract infection and diarrhea.    Diagnoses and all orders for this  visit:    Dehydration  -     Cancel: Renal Function Panel  -     Renal Function Panel    Diarrhea of presumed infectious origin  -     Stool Culture - Stool, Per Rectum; Future    Other orders  -     diphenoxylate-atropine (LOMOTIL) 2.5-0.025 MG per tablet; Take 1 tablet by mouth 4 (Four) Times a Day As Needed for Diarrhea.             reascent continuing dehydration/elevated BUN and recurrent hospitalisation

## 2018-11-28 PROBLEM — I82.409 DVT (DEEP VENOUS THROMBOSIS) (HCC): Status: ACTIVE | Noted: 2018-01-01

## 2018-11-28 NOTE — TELEPHONE ENCOUNTER
Pr Dr. Bhardwaj, MsGina Arroyo's son  has been called with her recent lab results & recommendations.  Continue her current medications and follow-up as planned or sooner if any problems.      ----- Message from Abilio Bhardwaj MD sent at 11/27/2018  4:25 PM CST -----  Anemia stable

## 2018-11-28 NOTE — TELEPHONE ENCOUNTER
-Pr Dr. Bhardwaj, Ms. Bone's son  has been called with her recent lab results & recommendations.  Continue her current medications and follow-up as planned or sooner if any problems.      Her Son seems to think she has probably forgotten to take the Calcium Supplements she has at home.  He will check and see if she is out or just forgetting to take them and get that taken care of.        ---- Message from Abilio Bhardwaj MD sent at 11/27/2018  4:23 PM CST -----  Currently not dehydrated.  Her calcium is getting low however.  Recommending calcium carbonate 600 mg twice a day.  She may not be able to take the pills but she can substitute the Viactiv 500 mg 3 times a day chewable

## 2018-11-28 NOTE — PROGRESS NOTES
Pr Dr. Bhardwaj, MsGina Arroyo's son JR has been called with her recent lab results & recommendations.  Continue her current medications and follow-up as planned or sooner if any problems.

## 2018-11-29 NOTE — OUTREACH NOTE
Medical Week 3 Survey      Responses   Facility patient discharged from?  [place]   Does the patient have one of the following disease processes/diagnoses(primary or secondary)?  Other   Week 3 attempt successful?  No   Revoke  Readmitted          Sandra Delgado RN

## 2018-12-01 NOTE — OUTREACH NOTE
Prep Survey      Responses   Facility patient discharged from?  [facility]   Is patient eligible?  Yes   Discharge diagnosis  DVT   Does the patient have one of the following disease processes/diagnoses(primary or secondary)?  Other   Does the patient have Home health ordered?  Yes   What is the Home health agency?   [facility]   Is there a DME ordered?  No   Comments regarding appointments  See AVS   Prep survey completed?  Yes          Daiana Foote RN

## 2018-12-03 NOTE — OUTREACH NOTE
Medical Week 1 Survey      Responses   Facility patient discharged from?  [place]   Does the patient have one of the following disease processes/diagnoses(primary or secondary)?  Other   Is there a successful TCM telephone encounter documented?  No   Week 1 attempt successful?  Yes   Call start time  0933   Call end time  0940   Discharge diagnosis  DVT   Is patient permission given to speak with other caregiver?  Yes   List who call center can speak with  Caregivers, has sitters 24/7   Meds reviewed with patient/caregiver?  No   Is the patient having any side effects they believe may be caused by any medication additions or changes?  No   Does the patient have an appointment with their PCP within 7 days of discharge?  N/A   What is the Home health agency?   [facility]   Has home health visited the patient within 72 hours of discharge?  Yes   Home health comments  HH still coming   What is the patient's perception of their health status since discharge?  Same   Additional teach back comments  Spoke with the PT's caregiver today and she reports that she is not eating or drinking. She also reports that the pt has been having constant diarrhea. The family is aware. Pt being cared for 24/7 and they are aware that her health is fading.   Week 1 call completed?  Yes          Leisa Bartlett RN

## 2018-12-05 NOTE — TELEPHONE ENCOUNTER
Requesting refill on her Lomotil Take 1 Tab 4 times a day.    Last OV 11/27/18    Next Tor OV 12/06/18    Last Script Written 11/27/18  #10 with 1 refill    Last MICHAEL  11/12/18    Please Advise on refills    Thank you

## 2018-12-06 PROBLEM — S81.812A LACERATION OF LEFT LOWER LEG: Status: ACTIVE | Noted: 2018-01-01

## 2018-12-06 NOTE — PROGRESS NOTES
Subjective   Tfifanie Arroyo is a 89 y.o. female.     Fatigue   This is a chronic problem. The current episode started more than 1 year ago. The problem occurs constantly. The problem has been gradually worsening. Associated symptoms include anorexia and fatigue. Pertinent negatives include no chest pain or numbness.   Hypertension   This is a chronic problem. The current episode started more than 1 year ago. The problem has been waxing and waning since onset. The problem is uncontrolled. Associated symptoms include peripheral edema. Pertinent negatives include no chest pain, palpitations or shortness of breath.   Dementia   This is a new problem. The current episode started more than 1 month ago. The problem occurs constantly. The problem has been gradually worsening. Associated symptoms include anorexia and fatigue. Pertinent negatives include no chest pain or numbness.   Leg Pain    The incident occurred more than 1 week ago (New DVT in [facility]). The pain is present in the right leg. The quality of the pain is described as aching. The pain is mild. Associated symptoms include an inability to bear weight, a loss of motion, a loss of sensation and muscle weakness. Pertinent negatives include no numbness. The treatment provided no relief.   Diarrhea    This is a recurrent problem. The current episode started 1 to 4 weeks ago. The problem occurs 2 to 4 times per day. The problem has been waxing and waning.        The following portions of the patient's history were reviewed and updated as appropriate: allergies, current medications, past family history, past medical history, past social history, past surgical history and problem list.    Review of Systems   Constitutional: Positive for fatigue.   Respiratory: Negative for shortness of breath.    Cardiovascular: Positive for leg swelling. Negative for chest pain and palpitations.   Gastrointestinal: Positive for anorexia and diarrhea.   Neurological: Negative for  numbness.       Objective   Physical Exam   Constitutional: She is oriented to person, place, and time. She appears well-developed and well-nourished. No distress.   HENT:   Head: Normocephalic and atraumatic.   Cardiovascular: Normal rate, regular rhythm and normal heart sounds. Exam reveals no gallop and no friction rub.   No murmur heard.  Pulmonary/Chest: Effort normal and breath sounds normal. No stridor. No respiratory distress. She exhibits no tenderness.   Abdominal: Soft. Bowel sounds are normal. There is no tenderness.   Musculoskeletal: She exhibits edema (right leg) and tenderness.   Neurological: She is alert and oriented to person, place, and time. She displays no tremor. Coordination and gait abnormal.   Skin: Skin is warm and dry. She is not diaphoretic.   Psychiatric: She has a normal mood and affect. Her behavior is normal. Judgment and thought content normal. Cognition and memory are impaired (MMSE 13/30). She exhibits abnormal recent memory and abnormal remote memory.   Nursing note and vitals reviewed.      Assessment/Plan   Problems Addressed this Visit        Cardiovascular and Mediastinum    DVT (deep venous thrombosis) (CMS/[facility])       Digestive    Diarrhea    Relevant Orders    Stool Culture - Stool, Per Rectum    Ambulatory Referral to Home Health       Other    Laceration of left lower leg - Primary      Other Visit Diagnoses     Debility        Relevant Orders    Ambulatory Referral to Home Health    Dementia without behavioral disturbance, unspecified dementia type                Current outpatient and discharge medications have been reconciled for the patient.  Reviewed by: Abilio Bhardwaj MD  This is in [facility] recheck for DVT.  She was noted to have a DVT on ultrasound and wound care.  Patient was admitted the hospital and got one shot of Lovenox.  She dropped her hemoglobin 1 g and Lovenox was held.  There was discussion about putting in an in.  Vena cava filter but patient  declined.  Her overall clinical condition continues to deteriorate.  At this point she she is unable to give any assistance with her getting up.  I have discussed with her son and feel like continue her care at home with home health doing her wound care dressing.  We will minimize her necessity of leaving the home at this point.   addendum.  Patient's blood pressures now running low.  I suspect either she has thrown a pulmonary embolism or is at end-stage congestive heart failure with a history of pulmonary hypertension.  In either case she is terminal.  I have ordered hospice.

## 2018-12-07 NOTE — TELEPHONE ENCOUNTER
Giulia with [facility] has called and said they received an order for this patient however they will not be able to treat because she is already in home health with [facility].

## 2018-12-10 NOTE — OUTREACH NOTE
Medical Week 2 Survey      Responses   Facility patient discharged from?  [facility]   Does the patient have one of the following disease processes/diagnoses(primary or secondary)?  Other   Week 2 attempt successful?  Yes   Call start time  1356   Call end time  1407   Is patient permission given to speak with other caregiver?  Yes   List who call center can speak with  Caregivers, has sitters 24/7   Person spoke with today (if not patient) and relationship  caregiver   Meds reviewed with patient/caregiver?  Yes   Is the patient having any side effects they believe may be caused by any medication additions or changes?  No   Does the patient have all medications ordered at discharge?  Yes   Is the patient taking all medications as directed (includes completed medication regime)?  Yes   Has the patient kept scheduled appointments due by today?  Yes   Did the patient receive a copy of their discharge instructions?  Yes   What is the patient's perception of their health status since discharge?  Same   Additional teach back comments  Pt is eating better this week per caregiver   Week 2 Call Completed?  Yes   Revoked  No further contact(revokes)-requires comment   Graduated/Revoked comments  pt health status is not changing, has all her meds, has 24/7 caregivers, acjosefa Helms, RN

## 2018-12-12 NOTE — OUTREACH NOTE
Talked with patient's caregiver that patient is receiving Home Health and Hospice. Contacted [facility] [phone]/Chelsy who states patient had home health services from 11/7/18 through 12/10/18. On the evening of 12/10/18 patient opened to Hospice services.

## 2021-01-01 NOTE — OUTREACH NOTE
Skilled Nursing Facility Discharge Flowsheet:     Skilled Nursing Facility Discharge Assessment 9/13/2018   Acute Facility Discharged From [place]   Acute Discharge Date 9/4/2018   Name of the Skilled Nursing Facility? [facility]   Tier Level of the Skilled Nursing Facility 4   Estimated length of stay for the patient? 1 day   Progression of Patient? discharged home   Skilled Nursing Discharge Date? 9/5/2018   Where was the patient discharged to? Home   Home Health Agency at discharge? No     
01-Jul-2020

## 2024-01-25 NOTE — ED PROVIDER NOTES
Subjective     History provided by:  Patient  Laceration   Location:  Leg  Leg laceration location:  L lower leg  Length:  8cm  Depth:  Through dermis  Quality: jagged    Bleeding: controlled    Time since incident:  6 hours  Laceration mechanism:  Blunt object (car door)  Pain details:     Quality:  Aching and throbbing    Severity:  Moderate    Timing:  Constant    Progression:  Unchanged  Relieved by:  Nothing  Tetanus status:  Out of date  Associated symptoms: no fever        Review of Systems   Constitutional: Negative.  Negative for fever.   HENT: Negative.    Respiratory: Negative.    Cardiovascular: Negative.  Negative for chest pain.   Gastrointestinal: Negative.  Negative for abdominal pain.   Endocrine: Negative.    Genitourinary: Negative.  Negative for dysuria.   Skin: Positive for color change.   Neurological: Negative.    Psychiatric/Behavioral: Negative.    All other systems reviewed and are negative.      Past Medical History:   Diagnosis Date   • Acute congestive heart failure (CMS/HCC)    • Atrial flutter (CMS/HCC)    • Coronary arteriosclerosis    • Degenerative joint disease involving multiple joints    • Depressive disorder    • Generalized anxiety disorder    • Glaucoma    • Hypertension    • Nonrheumatic mitral valve insufficiency    • Nonrheumatic tricuspid valve disorder    • Osteoporosis    • Repeated falls    • Subdural hematoma (CMS/HCC)        Allergies   Allergen Reactions   • Codeine Nausea Only and GI Intolerance   • Penicillins Hives   • Sulfa Antibiotics Other (See Comments)     Occurred a long time ago - pt cannot remember reaction.       Past Surgical History:   Procedure Laterality Date   • CORONARY ARTERY BYPASS GRAFT     • EXPLORATORY LAPAROTOMY     • HIP ARTHROPLASTY Left    • NECK LESION BIOPSY     • THYROIDECTOMY         Family History   Problem Relation Age of Onset   • Hypertension Father        Social History     Social History   • Marital status:      Social  History Main Topics   • Smoking status: Never Smoker   • Smokeless tobacco: Never Used   • Alcohol use No   • Drug use: No   • Sexual activity: Not Currently     Other Topics Concern   • Not on file           Objective   Physical Exam   Constitutional: She is oriented to person, place, and time. She appears well-developed and well-nourished. No distress.   HENT:   Head: Normocephalic and atraumatic.   Right Ear: External ear normal.   Left Ear: External ear normal.   Nose: Nose normal.   Eyes: Conjunctivae are normal.   Neck: Normal range of motion. Neck supple. No JVD present. No tracheal deviation present.   Cardiovascular: Normal rate.    No murmur heard.  Pulmonary/Chest: Effort normal. No respiratory distress. She has no wheezes.   Abdominal: Soft. There is no tenderness.   Musculoskeletal: Normal range of motion. She exhibits no edema or deformity.   Neurological: She is alert and oriented to person, place, and time. No cranial nerve deficit.   Skin: Skin is warm and dry. No rash noted. She is not diaphoretic. No erythema. No pallor.   8 x 3 cm laceration to the left lower leg.  Laceration runs parallel to the tibial crest.  Bleeding is controlled.  Bilateral peripheral edema secondary to congestive heart failure.   Psychiatric: She has a normal mood and affect. Her behavior is normal. Thought content normal.   Nursing note and vitals reviewed.      Laceration Repair  Date/Time: 10/2/2018 7:22 PM  Performed by: JERRELL CONTRERAS  Authorized by: JOSH BARR     Consent:     Consent obtained:  Verbal    Consent given by:  Patient  Anesthesia (see MAR for exact dosages):     Anesthesia method:  Local infiltration    Local anesthetic:  Lidocaine 1% w/o epi  Laceration details:     Location:  Leg    Leg location:  L lower leg    Length (cm):  8  Repair type:     Repair type:  Intermediate  Pre-procedure details:     Preparation:  Patient was prepped and draped in usual sterile fashion  Exploration:      Hemostasis achieved with:  Direct pressure    Wound extent: fascia violated    Treatment:     Area cleansed with:  Betadine and saline    Amount of cleaning:  Standard    Irrigation solution:  Sterile saline    Irrigation method:  Pressure wash    Visualized foreign bodies/material removed: no    Skin repair:     Repair method:  Sutures and Steri-Strips    Suture size:  4-0    Suture material:  Nylon    Suture technique:  Horizontal mattress (and simple interrupted)    Number of sutures: 3 horizontal mattress, and 2 simple interrupted.  Approximation:     Approximation:  Close  Post-procedure details:     Dressing:  Non-adherent dressing and bulky dressing    Patient tolerance of procedure:  Tolerated well, no immediate complications  Comments:      Steri-Strips were placed parallel to the laceration.  Horizontal mattress was used to close the laceration.  Attempted to place 4. 0 Vicryl however there was no adipose tissue along the lateral side of the laceration underneath the tibia crest to perform internal closure.               ED Course                  MDM  Number of Diagnoses or Management Options  Laceration of left lower leg, initial encounter: new and does not require workup  Risk of Complications, Morbidity, and/or Mortality  Presenting problems: low  Diagnostic procedures: low  Management options: low    Patient Progress  Patient progress: stable        Final diagnoses:   Laceration of left lower leg, initial encounter            Amaury Mathews PA  10/02/18 1929     PCP